# Patient Record
Sex: MALE | Race: BLACK OR AFRICAN AMERICAN | NOT HISPANIC OR LATINO | Employment: OTHER | URBAN - METROPOLITAN AREA
[De-identification: names, ages, dates, MRNs, and addresses within clinical notes are randomized per-mention and may not be internally consistent; named-entity substitution may affect disease eponyms.]

---

## 2017-02-01 DIAGNOSIS — Z79.4: Primary | ICD-10-CM

## 2017-02-01 DIAGNOSIS — E11.3553: Primary | ICD-10-CM

## 2017-07-27 ENCOUNTER — OFFICE VISIT (OUTPATIENT)
Dept: OPHTHALMOLOGY | Facility: CLINIC | Age: 67
End: 2017-07-27
Attending: OPHTHALMOLOGY

## 2017-07-27 DIAGNOSIS — Z79.4: ICD-10-CM

## 2017-07-27 DIAGNOSIS — E11.3553: ICD-10-CM

## 2017-07-27 PROCEDURE — 92250 FUNDUS PHOTOGRAPHY W/I&R: CPT | Mod: ZF | Performed by: OPHTHALMOLOGY

## 2017-07-27 PROCEDURE — 92134 CPTRZ OPH DX IMG PST SGM RTA: CPT | Mod: ZF | Performed by: OPHTHALMOLOGY

## 2017-07-27 PROCEDURE — 99213 OFFICE O/P EST LOW 20 MIN: CPT | Mod: ZF

## 2017-07-27 PROCEDURE — 92235 FLUORESCEIN ANGRPH MLTIFRAME: CPT | Mod: ZF | Performed by: OPHTHALMOLOGY

## 2017-07-27 ASSESSMENT — CONF VISUAL FIELD
METHOD: COUNTING FINGERS
OD_INFERIOR_TEMPORAL_RESTRICTION: 3
OS_NORMAL: 1
OD_SUPERIOR_TEMPORAL_RESTRICTION: 3

## 2017-07-27 ASSESSMENT — CUP TO DISC RATIO
OS_RATIO: 0.2
OD_RATIO: 0.3

## 2017-07-27 ASSESSMENT — VISUAL ACUITY
OD_SC+: -2
OS_SC: 20/25
METHOD: SNELLEN - LINEAR
OS_SC+: -2
OD_SC: 20/50

## 2017-07-27 ASSESSMENT — TONOMETRY
OD_IOP_MMHG: 18
OS_IOP_MMHG: 19
IOP_METHOD: TONOPEN

## 2017-07-27 ASSESSMENT — SLIT LAMP EXAM - LIDS
COMMENTS: NORMAL
COMMENTS: NORMAL

## 2017-07-27 ASSESSMENT — EXTERNAL EXAM - LEFT EYE: OS_EXAM: NORMAL

## 2017-07-27 ASSESSMENT — EXTERNAL EXAM - RIGHT EYE: OD_EXAM: NORMAL

## 2017-07-27 NOTE — PROGRESS NOTES
CC: follow up    HPI: status post CE/IOL with PPV/MP/EL/FAX/20% SF6 OD (7/12/16) for cataract with ERM and PDR with focal nasal TRD. vision improving.     RETINAL IMAGING  Optical Coherence Tomography 7-27-17  Right eye: improved foveal contour, mild irregularity of the retina, mild retina thinning.  Minimal residual ERM  Left eye: mild atrophy;minimal ERM    FA 7-27-17  Right eye regressed NVE with minimal leakage inferior to disk, patchy capillary dropout inferior macula,elargement of JOMAR,  PRP  Left eye NVE superior arcade, capillary dropout inferotemporal macula, PRP    Assessment/plan:  1. status post CE/IOL with PPV/MP/EL/FAX/20% SF6 OD (7/12/16)                         - doing well, vision stable, no Retinal Detachment on exam       2. PDR OU, s/p PRP                          - regressed NVE with minimal leakage Right Eye, consider focal laser to MAs    - residual NVE Left Eye with capillary dropout temporal macula, will plan for focal laser (light laser to inferotemporal macula)     - no diabetic macular edema on OCT    Follow up focal laser both eyes next Wednesday    ~~~~~~~~~~~~~~~~~~~~~~~~~~~~~~~~~~   Complete documentation of historical and exam elements from today's encounter can be found in the full encounter summary report (not reduplicated in this progress note).  I personally obtained the chief complaint(s) and history of present illness.  I confirmed and edited as necessary the review of systems, past medical/surgical history, family history, social history, and examination findings as documented by others; and I examined the patient myself.  I personally reviewed the relevant tests, images, and reports as documented above.  I formulated and edited as necessary the assessment and plan and discussed the findings and management plan with the patient and family    Mireille Don MD  .  Retina Service   Department of Ophthalmology and Visual Neurosciences   [facility]  [place]  Phone: [phone]   Fax: [phone]       .

## 2017-07-27 NOTE — NURSING NOTE
Chief Complaints and History of Present Illnesses   Patient presents with     Follow Up For     OCT and FA     HPI    Affected eye(s):  Both   Symptoms:        Frequency:  Constant       Do you have eye pain now?:  No      Comments:  States va is the same since last visit  No F&F  A1C 8.??   this am BS low 71 high 300  Becki Melendrez COT 2:40 PM July 27, 2017

## 2017-07-27 NOTE — MR AVS SNAPSHOT
After Visit Summary   2017    Berny Estrada    MRN: [id number]           Patient Information     Date Of Birth          1950        Visit Information        Provider Department      2017 2:30 PM Mireille Don MD [facility]        Today's Diagnoses     Diabetes mellitus with stable proliferative retinopathy of both eyes, with long-term current use of insulin (H)           Follow-ups after your visit        Your next 10 appointments already scheduled     Aug 02, 2017 10:00 AM CDT   RETURN RETINA with Mireille Don MD   Eye Clinic ([facility])    [address]  [address]  [address]  [place] [address]   [phone]              Who to contact     Please call your clinic at [phone] to:    Ask questions about your health    Make or cancel appointments    Discuss your medicines    Learn about your test results    Speak to your doctor   If you have compliments or concerns about an experience at your clinic, or if you wish to file a complaint, please contact [facility] Patient Relations at [phone] or email us at [email or web address]         Additional Information About Your Visit        MyChart Information     [employer] is an electronic gateway that provides easy, online access to your medical records. With [employer], you can request a clinic appointment, read your test results, renew a prescription or communicate with your care team.     To sign up for [employer] visit the website at [email or web address]   You will be asked to enter the access code listed below, as well as some personal information. Please follow the directions to create your username and password.     Your access code is: [email or web address]  Expires: 10/18/2017  6:30 AM     Your access code will  in 90 days. If you need help or a new code, please contact your [facility] Clinic or call [phone]  for assistance.        Care EveryWhere ID     This is your Care EveryWhere ID. This could be used by other organizations to access your [place] medical records  [id number]         Blood Pressure from Last 3 Encounters:   07/12/16 139/89    Weight from Last 3 Encounters:   07/12/16 102.5 kg (226 lb)              We Performed the Following     Fluorescein Angiography OU (both eyes)     Fundus Photos OU (both eyes)     OCT Retina Spectralis OU (both eyes)        Primary Care Provider Office Phone # Fax #    Patria Goodson, AURA [phone]       [facility] [address] [facility] [address]        Equal Access to Services     ERICK [facility] : Hadii aad ku hadasho Soomaali, waaxda luqadaha, qaybta kaalmada adeegyada, meghana argueta haybrien dupree . So [facility] [phone].    ATENCIÓN: Si habla español, tiene a dodd disposición servicios gratuitos de asistencia lingüística. [facility] [phone].    We comply with applicable federal civil rights laws and [place] laws. We do not discriminate on the basis of race, color, national origin, age, disability sex, sexual orientation or gender identity.            Thank you!     Thank you for choosing EYE CLINIC  for your care. Our goal is always to provide you with excellent care. Hearing back from our patients is one way we can continue to improve our services. Please take a few minutes to complete the written survey that you may receive in the mail after your visit with us. Thank you!             Your Updated Medication List - Protect others around you: Learn how to safely use, store and throw away your medicines at www.disposemymeds.org.          This list is accurate as of: 7/27/17  4:25 PM.  Always use your most recent med list.                   Brand Name Dispense Instructions for use Diagnosis    amLODIPine 10 MG tablet    NORVASC     Take 10 mg by mouth daily        ASPIRIN PO      Take 81 mg by mouth        carvedilol 12.5 MG  tablet    COREG     Take 12.5 mg by mouth 2 times daily        insulin aspart U-10 (diluted conc 10 units/mL) 10 unit/ml injection    NovoLOG     Inject Subcutaneous 3 times daily (before meals) 10-16 units        INSULIN GLARGINE SC      Inject 460 Units Subcutaneous At Bedtime 7/11 am        ketorolac 0.5 % ophthalmic solution    ACULAR    1 Bottle    Place 1 drop into the right eye 3 times daily    Aftercare following surgery of a sense organ       mycophenolate (IDS 3865) 250 MG capsule    CELLCEPT     Take 2 tablets by mouth 2 times daily        OMEPRAZOLE PO      Take 1 tablet by mouth daily        prednisoLONE 5 MG tablet      Take 1 tablet by mouth daily        prednisoLONE acetate 1 % ophthalmic susp    PRED FORTE    1 Bottle    Place 1 drop into the right eye 3 times daily    Aftercare following surgery of a sense organ       ranitidine 150 MG capsule    ZANTAC     Take 1 tablet by mouth daily        tacrolimus 5 MG capsule    PROGRAF - GENERIC EQUIVALENT     Take 5 mg by mouth daily        VITAMIN D (CHOLECALCIFEROL) PO      Take 1 tablet by mouth once a week

## 2017-07-27 NOTE — LETTER
7/27/2017      RE: eBrny Estrada  [address] [address]       CC: follow up    HPI: status post CE/IOL with PPV/MP/EL/FAX/20% SF6 OD (7/12/16) for cataract with ERM and PDR with focal nasal TRD. vision improving.     RETINAL IMAGING  Optical Coherence Tomography 7-27-17  Right eye: improved foveal contour, mild irregularity of the retina, mild retina thinning.  Minimal residual ERM  Left eye: mild atrophy;minimal ERM    FA 7-27-17  Right eye regressed NVE with minimal leakage inferior to disk, patchy capillary dropout inferior macula,elargement of JOMAR,  PRP  Left eye NVE superior arcade, capillary dropout inferotemporal macula, PRP    Assessment/plan:  1. status post CE/IOL with PPV/MP/EL/FAX/20% SF6 OD (7/12/16)                         -  doing well, vision stable, no Retinal Detachment on exam       2. PDR OU, s/p PRP                          - regressed NVE with minimal leakage Right Eye, consider focal laser to MAs    - residual NVE Left Eye with capillary dropout temporal macula, will plan for focal laser (light laser to inferotemporal macula)     - no diabetic macular edema on OCT    Follow up focal laser both eyes next Wednesday        ~~~~~~~~~~~~~~~~~~~~~~~~~~~~~~~~~~   Complete documentation of historical and exam elements from today's encounter can be found in the full encounter summary report (not reduplicated in this progress note).  I personally obtained the chief complaint(s) and history of present illness.  I confirmed and edited as necessary the review of systems, past medical/surgical history, family history, social history, and examination findings as documented by others; and I examined the patient myself.  I personally reviewed the relevant tests, images, and reports as documented above.  I formulated and edited as necessary the assessment and plan and discussed the findings and management plan with the patient and family    Mireille Don MD  .   Retina Service   Department of Ophthalmology and Visual Neurosciences   [facility]  Phone: [phone]   Fax: [phone]         Mireille Don MD     Retina Service   Department of Ophthalmology and Visual Neurosciences   [facility]  Phone:  [phone]   Fax:  [phone]

## 2017-07-27 NOTE — LETTER
7/27/2017       RE: Berny Estrada  [address] [address]     Dear Colleague,    Thank you for referring your patient, Berny Estrada, to the EYE CLINIC at [facility]. Please see a copy of my visit note below.    CC: follow up    HPI: status post CE/IOL with PPV/MP/EL/FAX/20% SF6 OD (7/12/16) for cataract with ERM and PDR with focal nasal TRD. vision improving.     RETINAL IMAGING  Optical Coherence Tomography 7-27-17  Right eye: improved foveal contour, mild irregularity of the retina, mild retina thinning.  Minimal residual ERM  Left eye: mild atrophy;minimal ERM    FA 7-27-17  Right eye regressed NVE with minimal leakage inferior to disk, patchy capillary dropout inferior macula,elargement of JOMAR,  PRP  Left eye NVE superior arcade, capillary dropout inferotemporal macula, PRP    Assessment/plan:  1. status post CE/IOL with PPV/MP/EL/FAX/20% SF6 OD (7/12/16)                         -  doing well, vision stable, no Retinal Detachment on exam       2. PDR OU, s/p PRP                          - regressed NVE with minimal leakage Right Eye, consider focal laser to MAs    - residual NVE Left Eye with capillary dropout temporal macula, will plan for focal laser (light laser to inferotemporal macula)     - no diabetic macular edema on OCT    Follow up focal laser both eyes next Wednesday    ~~~~~~~~~~~~~~~~~~~~~~~~~~~~~~~~~~   Complete documentation of historical and exam elements from today's encounter can be found in the full encounter summary report (not reduplicated in this progress note).  I personally obtained the chief complaint(s) and history of present illness.  I confirmed and edited as necessary the review of systems, past medical/surgical history, family history, social history, and examination findings as documented by others; and I examined the patient myself.  I personally reviewed the relevant tests, images, and reports as documented above.  I  formulated and edited as necessary the assessment and plan and discussed the findings and management plan with the patient and family    Mireille Don MD  .  Retina Service   Department of Ophthalmology and Visual Neurosciences   [facility]  Phone: [phone]   Fax: [phone]       .

## 2021-05-27 ENCOUNTER — TRANSFERRED RECORDS (OUTPATIENT)
Dept: HEALTH INFORMATION MANAGEMENT | Facility: CLINIC | Age: 71
End: 2021-05-27

## 2021-10-12 PROCEDURE — 80197 ASSAY OF TACROLIMUS: CPT

## 2021-10-13 ENCOUNTER — LAB REQUISITION (OUTPATIENT)
Dept: LAB | Facility: CLINIC | Age: 71
End: 2021-10-13

## 2021-10-14 LAB
TACROLIMUS BLD-MCNC: 4.6 UG/L (ref 5–15)
TME LAST DOSE: ABNORMAL H
TME LAST DOSE: ABNORMAL H

## 2022-02-04 ENCOUNTER — LAB REQUISITION (OUTPATIENT)
Dept: LAB | Facility: CLINIC | Age: 72
End: 2022-02-04

## 2022-02-04 ENCOUNTER — TRANSFERRED RECORDS (OUTPATIENT)
Dept: HEALTH INFORMATION MANAGEMENT | Facility: CLINIC | Age: 72
End: 2022-02-04

## 2022-02-04 PROCEDURE — 87252 VIRUS INOCULATION TISSUE: CPT

## 2022-02-04 PROCEDURE — 80197 ASSAY OF TACROLIMUS: CPT

## 2022-02-05 LAB
TACROLIMUS BLD-MCNC: 8.7 UG/L (ref 5–15)
TME LAST DOSE: NORMAL H
TME LAST DOSE: NORMAL H

## 2022-10-12 PROCEDURE — 80197 ASSAY OF TACROLIMUS: CPT

## 2022-10-13 ENCOUNTER — LAB REQUISITION (OUTPATIENT)
Dept: LAB | Facility: CLINIC | Age: 72
End: 2022-10-13

## 2022-10-14 LAB
TACROLIMUS BLD-MCNC: 12.8 UG/L (ref 5–15)
TME LAST DOSE: NORMAL H
TME LAST DOSE: NORMAL H

## 2023-01-20 ENCOUNTER — LAB REQUISITION (OUTPATIENT)
Dept: LAB | Facility: CLINIC | Age: 73
End: 2023-01-20

## 2023-01-20 LAB
TACROLIMUS BLD-MCNC: 3.9 UG/L (ref 5–15)
TME LAST DOSE: ABNORMAL H
TME LAST DOSE: ABNORMAL H

## 2023-01-20 PROCEDURE — 80197 ASSAY OF TACROLIMUS: CPT

## 2023-02-09 PROCEDURE — 80197 ASSAY OF TACROLIMUS: CPT

## 2023-02-10 ENCOUNTER — LAB REQUISITION (OUTPATIENT)
Dept: LAB | Facility: CLINIC | Age: 73
End: 2023-02-10

## 2023-02-10 LAB
TACROLIMUS BLD-MCNC: 5 UG/L (ref 5–15)
TME LAST DOSE: NORMAL H
TME LAST DOSE: NORMAL H

## 2023-02-27 ENCOUNTER — LAB REQUISITION (OUTPATIENT)
Dept: LAB | Facility: CLINIC | Age: 73
End: 2023-02-27

## 2023-02-27 LAB
TACROLIMUS BLD-MCNC: 5.6 UG/L (ref 5–15)
TME LAST DOSE: NORMAL H
TME LAST DOSE: NORMAL H

## 2023-02-27 PROCEDURE — 80197 ASSAY OF TACROLIMUS: CPT

## 2023-06-08 PROCEDURE — 80197 ASSAY OF TACROLIMUS: CPT

## 2023-06-09 ENCOUNTER — LAB REQUISITION (OUTPATIENT)
Dept: LAB | Facility: CLINIC | Age: 73
End: 2023-06-09

## 2023-06-10 LAB
TACROLIMUS BLD-MCNC: 15.5 UG/L (ref 5–15)
TME LAST DOSE: ABNORMAL H
TME LAST DOSE: ABNORMAL H

## 2023-06-27 ENCOUNTER — TRANSFERRED RECORDS (OUTPATIENT)
Dept: HEALTH INFORMATION MANAGEMENT | Facility: CLINIC | Age: 73
End: 2023-06-27

## 2023-06-27 PROCEDURE — 80197 ASSAY OF TACROLIMUS: CPT

## 2023-06-28 ENCOUNTER — LAB REQUISITION (OUTPATIENT)
Dept: LAB | Facility: CLINIC | Age: 73
End: 2023-06-28

## 2023-06-28 LAB
TACROLIMUS BLD-MCNC: 3.7 UG/L (ref 5–15)
TME LAST DOSE: ABNORMAL H
TME LAST DOSE: ABNORMAL H

## 2023-09-01 PROCEDURE — 80197 ASSAY OF TACROLIMUS: CPT

## 2023-09-05 ENCOUNTER — LAB REQUISITION (OUTPATIENT)
Dept: LAB | Facility: CLINIC | Age: 73
End: 2023-09-05

## 2023-09-05 LAB
TACROLIMUS BLD-MCNC: 3.9 UG/L (ref 5–15)
TME LAST DOSE: ABNORMAL H
TME LAST DOSE: ABNORMAL H

## 2023-12-01 ENCOUNTER — TRANSFERRED RECORDS (OUTPATIENT)
Dept: HEALTH INFORMATION MANAGEMENT | Facility: CLINIC | Age: 73
End: 2023-12-01
Payer: MEDICARE

## 2023-12-01 PROCEDURE — 80197 ASSAY OF TACROLIMUS: CPT

## 2023-12-02 ENCOUNTER — LAB REQUISITION (OUTPATIENT)
Dept: LAB | Facility: CLINIC | Age: 73
End: 2023-12-02

## 2023-12-02 ENCOUNTER — TRANSFERRED RECORDS (OUTPATIENT)
Dept: HEALTH INFORMATION MANAGEMENT | Facility: CLINIC | Age: 73
End: 2023-12-02
Payer: MEDICARE

## 2023-12-02 LAB
TACROLIMUS BLD-MCNC: 2.2 UG/L (ref 5–15)
TME LAST DOSE: ABNORMAL H
TME LAST DOSE: ABNORMAL H

## 2023-12-03 LAB — ALBUMIN/CREATININE RATIO: 54.3 MG/G CREAT

## 2023-12-03 PROCEDURE — 80197 ASSAY OF TACROLIMUS: CPT

## 2023-12-04 ENCOUNTER — TRANSFERRED RECORDS (OUTPATIENT)
Dept: HEALTH INFORMATION MANAGEMENT | Facility: CLINIC | Age: 73
End: 2023-12-04
Payer: MEDICARE

## 2023-12-04 ENCOUNTER — LAB REQUISITION (OUTPATIENT)
Dept: LAB | Facility: CLINIC | Age: 73
End: 2023-12-04

## 2023-12-04 LAB
TACROLIMUS BLD-MCNC: 2.2 UG/L (ref 5–15)
TME LAST DOSE: ABNORMAL H
TME LAST DOSE: ABNORMAL H

## 2023-12-05 ENCOUNTER — LAB REQUISITION (OUTPATIENT)
Dept: LAB | Facility: CLINIC | Age: 73
End: 2023-12-05

## 2023-12-05 LAB
TACROLIMUS BLD-MCNC: 5.4 UG/L (ref 5–15)
TME LAST DOSE: NORMAL H
TME LAST DOSE: NORMAL H

## 2023-12-05 PROCEDURE — 80197 ASSAY OF TACROLIMUS: CPT

## 2023-12-06 ENCOUNTER — LAB REQUISITION (OUTPATIENT)
Dept: LAB | Facility: CLINIC | Age: 73
End: 2023-12-06

## 2023-12-06 LAB
CMV DNA SPEC NAA+PROBE-ACNC: 338 IU/ML
CMV DNA SPEC NAA+PROBE-LOG#: 2.5 {LOG_COPIES}/ML

## 2023-12-07 ENCOUNTER — LAB REQUISITION (OUTPATIENT)
Dept: LAB | Facility: CLINIC | Age: 73
End: 2023-12-07

## 2023-12-07 LAB
TACROLIMUS BLD-MCNC: 5.1 UG/L (ref 5–15)
TME LAST DOSE: NORMAL H
TME LAST DOSE: NORMAL H

## 2023-12-07 PROCEDURE — 80197 ASSAY OF TACROLIMUS: CPT

## 2023-12-08 ENCOUNTER — TRANSFERRED RECORDS (OUTPATIENT)
Dept: HEALTH INFORMATION MANAGEMENT | Facility: CLINIC | Age: 73
End: 2023-12-08
Payer: MEDICARE

## 2023-12-09 ENCOUNTER — LAB REQUISITION (OUTPATIENT)
Dept: LAB | Facility: CLINIC | Age: 73
End: 2023-12-09

## 2023-12-09 ENCOUNTER — TRANSFERRED RECORDS (OUTPATIENT)
Dept: HEALTH INFORMATION MANAGEMENT | Facility: CLINIC | Age: 73
End: 2023-12-09
Payer: MEDICARE

## 2023-12-09 LAB
TACROLIMUS BLD-MCNC: 5.7 UG/L (ref 5–15)
TME LAST DOSE: NORMAL H
TME LAST DOSE: NORMAL H

## 2023-12-09 PROCEDURE — 80197 ASSAY OF TACROLIMUS: CPT

## 2023-12-13 ENCOUNTER — TELEPHONE (OUTPATIENT)
Dept: SURGERY | Facility: CLINIC | Age: 73
End: 2023-12-13
Payer: MEDICARE

## 2023-12-13 ENCOUNTER — LAB REQUISITION (OUTPATIENT)
Dept: LAB | Facility: CLINIC | Age: 73
End: 2023-12-13

## 2023-12-13 ENCOUNTER — TRANSFERRED RECORDS (OUTPATIENT)
Dept: HEALTH INFORMATION MANAGEMENT | Facility: CLINIC | Age: 73
End: 2023-12-13
Payer: MEDICARE

## 2023-12-13 LAB
TACROLIMUS BLD-MCNC: 8 UG/L (ref 5–15)
TME LAST DOSE: NORMAL H
TME LAST DOSE: NORMAL H

## 2023-12-13 PROCEDURE — 80197 ASSAY OF TACROLIMUS: CPT

## 2023-12-13 NOTE — TELEPHONE ENCOUNTER
Records Requested    Facility  [facility] - Referred by Dr. Kelley  Fax: [phone]    Outcome * 12/13/23 2:40 PM Faxed urgent request to [facility] for records to be faxed to the clinic. - Tiffanie    * 12/14/23 2:09 PM Over 600 pages received from [facility] and sent to HIM to be scanned into to chart. - Tiffanie    [facility]:  12/1/23 - Admission with Dr. Spaulding (12/7/23 CR Consult with Dr. Kelley)  11/23/23 - NEPH OV with Dr. Robledo    12/4/23 - Colonoscopy (Case: [id number])

## 2023-12-15 ENCOUNTER — PATIENT OUTREACH (OUTPATIENT)
Dept: SURGERY | Facility: CLINIC | Age: 73
End: 2023-12-15
Payer: MEDICARE

## 2023-12-15 ENCOUNTER — TRANSCRIBE ORDERS (OUTPATIENT)
Dept: OTHER | Age: 73
End: 2023-12-15

## 2023-12-15 DIAGNOSIS — C18.9 MALIGNANT NEOPLASM OF COLON (H): Primary | ICD-10-CM

## 2023-12-15 DIAGNOSIS — C18.0 MALIGNANT NEOPLASM OF CECUM (H): Primary | ICD-10-CM

## 2023-12-15 NOTE — TELEPHONE ENCOUNTER
Diagnosis, Referred by & from: Cecal Cancer   Appt date: 2024   NOTES STATUS DETAILS   OFFICE NOTE from referring provider N/A    OFFICE NOTE from other specialist Received [facility]:  23 - NEPH OV with Dr. Robledo   DISCHARGE SUMMARY from hospital Care Everywhere / Received [facility]:  23 - Admission with Dr. Spaulding (23 CR Consult with Dr. Kelley)  22 - Admission with Dr. Field  22 - Admission with Dr. Carranza      [facility]:  7/15/19 - Admission with Dr. Good   DISCHARGE REPORT from the ER N/A    OPERATIVE REPORT N/A    MEDICATION LIST Received    LABS     ANAL PAP/CEA [facility]:  ([facility]) 23 - CEA   BIOPSIES/PATHOLOGY RELATED TO DIAGNOSIS Received [facility]:  23 - Colon Biopsy (Case: [id number])   DIAGNOSTIC PROCEDURES     COLONOSCOPY Received [facility]:  23 - Colonoscopy   IMAGING (DISC & REPORT)      CT [facility]:  ([facility]) 23 - CT Chest/Abd/Pelvis     Records Requested  12/15/23    Facility  [facility]  IMG Fax: [phone]  Path Fax: [phone]    Outcome * 12/15/23 1:40 PM Faxed urg req to [facility] for images to be sent to the clinic. - Tiffanie    * 12/15/23 2:23 PM Faxed urg req to [facility] for pathology slides to be Fed'Exd to the clinic. - Tiffanie    * 23 2:37 PM Pathology slides received from [facility] and sent to be reviewed with filled out pathology form. - Tiffanie    Trackin

## 2023-12-15 NOTE — PROGRESS NOTES
Patient was called after receiving a referral from Dr. Kelley for cecal cancer. Patient has not completed staging.    CT scan: Yes 12/21                   Blood work:Yes 12/21    [facility]    Are images able/being pushed to our system? N/a    Colonoscopy Report: Yes         Pathology Report: Yes     Moderately differentiated adenocarcinoma of the cecum     Patient was offered a clinic appointment with  on 1/8. Patient is in agreement with this appointment date, time, and location. Patient's questions and concerns were addressed to his stated satisfaction. Patient is in agreement with this plan of care.

## 2023-12-21 ENCOUNTER — LAB (OUTPATIENT)
Dept: LAB | Facility: CLINIC | Age: 73
End: 2023-12-21
Payer: COMMERCIAL

## 2023-12-21 ENCOUNTER — ANCILLARY PROCEDURE (OUTPATIENT)
Dept: CT IMAGING | Facility: CLINIC | Age: 73
End: 2023-12-21
Attending: COLON & RECTAL SURGERY
Payer: COMMERCIAL

## 2023-12-21 DIAGNOSIS — C18.9 MALIGNANT NEOPLASM OF COLON (H): ICD-10-CM

## 2023-12-21 PROCEDURE — 74176 CT ABD & PELVIS W/O CONTRAST: CPT | Mod: GC | Performed by: RADIOLOGY

## 2023-12-21 PROCEDURE — 36415 COLL VENOUS BLD VENIPUNCTURE: CPT | Performed by: PATHOLOGY

## 2023-12-21 PROCEDURE — 71250 CT THORAX DX C-: CPT | Mod: GC | Performed by: RADIOLOGY

## 2023-12-21 PROCEDURE — 99000 SPECIMEN HANDLING OFFICE-LAB: CPT | Performed by: PATHOLOGY

## 2023-12-21 PROCEDURE — 82378 CARCINOEMBRYONIC ANTIGEN: CPT | Performed by: COLON & RECTAL SURGERY

## 2023-12-22 LAB — CEA SERPL-MCNC: 31.3 NG/ML

## 2023-12-26 LAB
PATH REPORT.COMMENTS IMP SPEC: ABNORMAL
PATH REPORT.COMMENTS IMP SPEC: YES
PATH REPORT.FINAL DX SPEC: ABNORMAL
PATH REPORT.GROSS SPEC: ABNORMAL
PATH REPORT.MICROSCOPIC SPEC OTHER STN: ABNORMAL
PATH REPORT.RELEVANT HX SPEC: ABNORMAL
PATH REPORT.RELEVANT HX SPEC: ABNORMAL
PATH REPORT.SITE OF ORIGIN SPEC: ABNORMAL

## 2023-12-26 PROCEDURE — 88321 CONSLTJ&REPRT SLD PREP ELSWR: CPT | Performed by: PATHOLOGY

## 2023-12-27 DIAGNOSIS — Z15.89 MLH1 GENE MUTATION: Primary | ICD-10-CM

## 2024-01-05 NOTE — PROGRESS NOTES
Colon and Rectal Surgery Clinic Note    RE: Berny Esrtada.  : 1950.  MAEVE: 2024.    Reason for visit: New cecal adenocarcinoma.    HPI:   73-year-old male who [facility] Hurtado for [facility].      Pathology consult here at [facility]:  1.  MID ESOPHAGUS, BIOPSY:  - Squamous mucosa with superficial colonization by fungal organisms positive for PAS stain, morphologically compatible with Candida species     2.  COLON, CECUM MASS, BIOPSY:  - Adenocarcinoma, moderately differentiated  - Loss of expression of MLH1 and PMS2; intact expression of MSH2 and MSH6 (see comment)     3.  ASCENDING COLON, POLYPECTOMY:  - Sessile serrated adenoma with focal high-grade dysplasia  - Separate minute fragment of adenocarcinoma     4.  TRANSVERSE COLON, POLYPECTOMY:  - Tubular adenoma; negative for high-grade dysplasia     5.  DESCENDING COLON, POLYPECTOMY:  - Tubular adenoma; negative for high-grade dysplasia     6.  RECTOSIGMOID COLON, POLYPECTOMY:  - Sessile serrated adenoma with focal cytologic dysplasia  - No evidence of high-grade dysplasia or invasive carcinoma  CT CAP 23:  IMPRESSION:   1. Cecal mass with enlarged nodes of the right lower quadrant  including a 2.1 x 1.7 cm lymph node, which are suspicious for  metastatic disease. Indeterminate thickening and nodularity of the  peritoneal surfaces near the cecal mass could be due to fluid or  mesenteric spread of disease.  3. Indeterminate 1.6 cm lesion of the left native kidney. Consider  dedicated renal MRI with contrast for further characterization.  4. Right lower quadrant renal transplant with mild hydronephrosis.  5. Ill-defined groundglass opacities of the left upper lobe and  lingula suspicious for an infectious/inflammatory etiology. Additional  bronchiectatic and fibroatelectatic changes of the right middle lobe  and lingula suggesting a chronic inflammatory process.  6. Small left-sided pleural  effusion.  7. Dilated main pulmonary artery as can be seen with pulmonary  arterial hypertension.  8. Cholelithiasis without findings to suggest acute cholecystitis.  CEA 31.3.  PMH: kidney transplant (2014), thrombocytopenia, DM II, HTN, COPD, CKD 3.  Apart from his kidney transplant, denies any previous abdominal or anorectal operations.  Apart from his iron deficiency anemia, he was not having any abdominal or GI symptoms.  Denies blood per rectum.  Family history significant for colon and prostate cancer in his brother.  The rest of his family history history is largely unknown.  He did have 1 previous colonoscopy right before his kidney transplant at [facility] and this was normal per the patient.  His overall health has declined over the last 6 months where he is currently in a wheelchair but does not use this all the time but is only able to ambulate 20-30 steps at most without becoming short of breath.  He is also on home oxygen.          Medical history:  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    Sleep apnea     has equipment  not always uses    Type 2 diabetes mellitus without complications (H)        Surgical history:  Past Surgical History:   Procedure Laterality Date    EYE SURGERY  7/12/2016    CE IOL PPV RE    PANRETINAL PHOTOCOAGULATION (PRP) OD (RIGHT EYE)      last 9/18/13    PANRETINAL PHOTOCOAGULATION (PRP) OS (LEFT EYE)  4/15/14    [facility]    PHACOEMULSIFICATION CLEAR CORNEA WITH STANDARD IOL, VITRECTOMY PARSPLANA 25 GAGUE, COMBINED Right 7/12/2016    Procedure: COMBINED PHACOEMULSIFICATION CLEAR CORNEA WITH STANDARD INTRAOCULAR LENS IMPLANT, VITRECTOMY PARSPLANA 25 GAUGE;  Surgeon: Mireille Don MD;  Location: [facility]       Family history:  Family History   Problem Relation Age of Onset    Glaucoma No family hx of      Macular Degeneration No family hx of        Medications:  Current Outpatient Medications   Medication Sig Dispense Refill    albuterol (PROAIR HFA/PROVENTIL HFA/VENTOLIN HFA) 108 (90 Base) MCG/ACT inhaler INHALE 2 PUFFS BY INHALATION EVERY 6 HOURS AS NEEDED FOR SHORTNESS OF BREATH, FOR SECRETIONS      amLODIPine (NORVASC) 10 MG tablet Take 10 mg by mouth daily      ASPIRIN PO Take 81 mg by mouth      carvedilol (COREG) 12.5 MG tablet Take 12.5 mg by mouth 2 times daily      fluconazole (DIFLUCAN) 200 MG tablet 200 mg      insulin aspart U-10, diluted conc 10 units/mL, (NOVOLOG) 10 unit/ml injection Inject Subcutaneous 3 times daily (before meals) 10-16 units      INSULIN GLARGINE SC Inject 460 Units Subcutaneous At Bedtime 7/11 am      ipratropium - albuterol 0.5 mg/2.5 mg/3 mL (DUONEB) 0.5-2.5 (3) MG/3ML neb solution INHALE 3ML IN NEBULIZER BY INHALATION FOUR TIMES A DAY AS NEEDED FOR SHORTNESS OF BREATH      ketorolac (ACULAR) 0.5 % ophthalmic solution Place 1 drop into the right eye 3 times daily 1 Bottle 1    loratadine (CLARITIN) 10 MG tablet 10 mg      mycophenolate, IDS 3865, (CELLCEPT) 250 MG capsule Take 2 tablets by mouth 2 times daily      OMEPRAZOLE PO Take 1 tablet by mouth daily      prednisoLONE 5 MG TABS Take 1 tablet by mouth daily      prednisoLONE acetate (PRED FORTE) 1 % ophthalmic suspension Place 1 drop into the right eye 3 times daily 1 Bottle 1    predniSONE (DELTASONE) 10 MG tablet Take 1 tablet by mouth daily      ranitidine (ZANTAC) 150 MG capsule Take 1 tablet by mouth daily      tacrolimus (PROGRAF-GENERIC EQUIVALENT) 5 MG capsule Take 5 mg by mouth daily      tiotropium-olodaterol 2.5-2.5 MCG/ACT AERS INHALE 2 PUFFS BY INHALATION EVERY DAY FOR COPD      VITAMIN D, CHOLECALCIFEROL, PO Take 1 tablet by mouth once a week         Allergies:  No Known Allergies    Social history:   Social History     Tobacco Use    Smoking status: Former    Smokeless tobacco: Never   Substance Use Topics     Alcohol use: Not on file     Marital status: .    Physical Examination:  BP (!) 142/78 (BP Location: Right arm, Patient Position: Sitting, Cuff Size: Adult Regular)   Pulse 70   SpO2 100%   General: Well hydrated. No acute distress.  Abdomen: Soft, NT. No inguinal adenopathy palpated.    Investigations:  None.    Procedures:  None.    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  No clear evidence of extra-abdominal metastatic disease. We discussed the role and impact of cytoreductive surgery with hyperthermic intraperitoneal chemotherapy for metastatic colorectal cancer, including the potential need for several cycles of preoperative chemotherapy, intraoperative findings that may limit or preclude the extent of resection and delivery of intraperitoneal chemotherapy, postoperative complications and sequelae, likelihood of recurrent cancer, and quality of life. We also discussed recent grade IA data (Prodige 7 trial) showing no significant differences in overall survival with cytoreductive surgery with hyperthermic intraperitoneal chemotherapy versus cytoreductive surgery alone. The patient understands these well and agrees to proceed. All questions were answered to his satisfaction. Risks, benefits, and alternatives of operative treatment were thoroughly discussed with the patient, he/she understands these well and agrees to proceed.    PLAN  1.  Schedule full set of labs with tumor markers including CEA, , and CA 19-9.  Also send prealbumin and transferrin.  2.  Please order MLH1 promoter gene testing.  3.  Schedule MR abdomen and pelvis (appendix protocol).  4.  Refer to medical oncology for likely systemic chemotherapy if presence of peritoneal carcinomatosis.  5.  Will discuss at  multidisciplinary colorectal cancer conference.  6.  Berny is a an extremely complicated patient because of his comorbidities and poor functional status.  I am not sure he would tolerate extensive abdominal surgery, much less cytoreduction or HIPEC.  Very high risk for postoperative complications and permanent stoma which may affect his renal function as well.    60 minutes spent on the date of encounter performing chart review, history and exam, documentation.     Mitchell Reid MD, MSc, FACS, FASCRS.    Chief, Division of Colon & Rectal Surgery  Stanley M. Goldberg, MD Endowed Chair, Colon & Rectal Surgery  Department of Surgery  [facility]      Referring Provider:  Lluvia Kelley MD  [facility] [address]  [place] [address]     Primary Care Provider:  Cat Goodson

## 2024-01-08 ENCOUNTER — OFFICE VISIT (OUTPATIENT)
Dept: SURGERY | Facility: CLINIC | Age: 74
End: 2024-01-08
Payer: COMMERCIAL

## 2024-01-08 ENCOUNTER — PRE VISIT (OUTPATIENT)
Dept: SURGERY | Facility: CLINIC | Age: 74
End: 2024-01-08

## 2024-01-08 VITALS — SYSTOLIC BLOOD PRESSURE: 142 MMHG | OXYGEN SATURATION: 100 % | HEART RATE: 70 BPM | DIASTOLIC BLOOD PRESSURE: 78 MMHG

## 2024-01-08 DIAGNOSIS — C18.0 MALIGNANT NEOPLASM OF CECUM (H): ICD-10-CM

## 2024-01-08 PROCEDURE — 99205 OFFICE O/P NEW HI 60 MIN: CPT | Performed by: COLON & RECTAL SURGERY

## 2024-01-08 RX ORDER — ALBUTEROL SULFATE 90 UG/1
AEROSOL, METERED RESPIRATORY (INHALATION)
COMMUNITY
Start: 2023-11-22

## 2024-01-08 RX ORDER — IPRATROPIUM BROMIDE AND ALBUTEROL SULFATE 2.5; .5 MG/3ML; MG/3ML
SOLUTION RESPIRATORY (INHALATION)
COMMUNITY
Start: 2023-11-30

## 2024-01-08 RX ORDER — FLUCONAZOLE 200 MG/1
200 TABLET ORAL
COMMUNITY
Start: 2023-12-08 | End: 2024-03-04

## 2024-01-08 RX ORDER — LORATADINE 10 MG/1
10 TABLET ORAL
COMMUNITY
Start: 2023-12-08

## 2024-01-08 RX ORDER — PREDNISONE 10 MG/1
40 TABLET ORAL DAILY
COMMUNITY
Start: 2023-12-08

## 2024-01-08 ASSESSMENT — PAIN SCALES - GENERAL: PAINLEVEL: NO PAIN (0)

## 2024-01-08 NOTE — LETTER
2024       RE: Berny Estrada  [address] [facility] [address]       Dear Colleague,    Thank you for referring your patient, Berny Estrada, to the [facility] [place] at [facility]. Please see a copy of my visit note below.    Colon and Rectal Surgery Clinic Note    RE: Berny Estrada.  : 1950.  MAEVE: 2024.    Reason for visit: New cecal adenocarcinoma.    HPI:   73-year-old male who [facility] Hurtado for anemia.      Pathology consult here at [facility]:  1.  MID ESOPHAGUS, BIOPSY:  - Squamous mucosa with superficial colonization by fungal organisms positive for PAS stain, morphologically compatible with Candida species     2.  COLON, CECUM MASS, BIOPSY:  - Adenocarcinoma, moderately differentiated  - Loss of expression of MLH1 and PMS2; intact expression of MSH2 and MSH6 (see comment)     3.  ASCENDING COLON, POLYPECTOMY:  - Sessile serrated adenoma with focal high-grade dysplasia  - Separate minute fragment of adenocarcinoma     4.  TRANSVERSE COLON, POLYPECTOMY:  - Tubular adenoma; negative for high-grade dysplasia     5.  DESCENDING COLON, POLYPECTOMY:  - Tubular adenoma; negative for high-grade dysplasia     6.  RECTOSIGMOID COLON, POLYPECTOMY:  - Sessile serrated adenoma with focal cytologic dysplasia  - No evidence of high-grade dysplasia or invasive carcinoma  CT CAP 23:  IMPRESSION:   1. Cecal mass with enlarged nodes of the right lower quadrant  including a 2.1 x 1.7 cm lymph node, which are suspicious for  metastatic disease. Indeterminate thickening and nodularity of the  peritoneal surfaces near the cecal mass could be due to fluid or  mesenteric spread of disease.  3. Indeterminate 1.6 cm lesion of the left native kidney. Consider  dedicated renal MRI with contrast for further characterization.  4. Right lower quadrant  renal transplant with mild hydronephrosis.  5. Ill-defined groundglass opacities of the left upper lobe and  lingula suspicious for an infectious/inflammatory etiology. Additional  bronchiectatic and fibroatelectatic changes of the right middle lobe  and lingula suggesting a chronic inflammatory process.  6. Small left-sided pleural effusion.  7. Dilated main pulmonary artery as can be seen with pulmonary  arterial hypertension.  8. Cholelithiasis without findings to suggest acute cholecystitis.  CEA 31.3.  PMH: kidney transplant (2014), thrombocytopenia, DM II, HTN, COPD, CKD 3.  Apart from his kidney transplant, denies any previous abdominal or anorectal operations.  Apart from his iron deficiency anemia, he was not having any abdominal or GI symptoms.  Denies blood per rectum.  Family history significant for colon and prostate cancer in his brother.  The rest of his family history history is largely unknown.  He did have 1 previous colonoscopy right before his kidney transplant at [facility] and this was normal per the patient.  His overall health has declined over the last 6 months where he is currently in a wheelchair but does not use this all the time but is only able to ambulate 20-30 steps at most without becoming short of breath.  He is also on home oxygen.          Medical history:  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    Sleep apnea     has equipment  not always uses    Type 2 diabetes mellitus without complications (H)        Surgical history:  Past Surgical History:   Procedure Laterality Date    EYE SURGERY  7/12/2016    CE IOL PPV RE    PANRETINAL PHOTOCOAGULATION (PRP) OD (RIGHT EYE)      last 9/18/13    PANRETINAL PHOTOCOAGULATION (PRP) OS (LEFT EYE)  4/15/14    [facility]    PHACOEMULSIFICATION CLEAR CORNEA WITH STANDARD IOL,  VITRECTOMY PARSPLANA 25 GAGUE, COMBINED Right 7/12/2016    Procedure: COMBINED PHACOEMULSIFICATION CLEAR CORNEA WITH STANDARD INTRAOCULAR LENS IMPLANT, VITRECTOMY PARSPLANA 25 GAUGE;  Surgeon: Mireille Don MD;  Location: [facility]       Family history:  Family History   Problem Relation Age of Onset    Glaucoma No family hx of     Macular Degeneration No family hx of        Medications:  Current Outpatient Medications   Medication Sig Dispense Refill    albuterol (PROAIR HFA/PROVENTIL HFA/VENTOLIN HFA) 108 (90 Base) MCG/ACT inhaler INHALE 2 PUFFS BY INHALATION EVERY 6 HOURS AS NEEDED FOR SHORTNESS OF BREATH, FOR SECRETIONS      amLODIPine (NORVASC) 10 MG tablet Take 10 mg by mouth daily      ASPIRIN PO Take 81 mg by mouth      carvedilol (COREG) 12.5 MG tablet Take 12.5 mg by mouth 2 times daily      fluconazole (DIFLUCAN) 200 MG tablet 200 mg      insulin aspart U-10, diluted conc 10 units/mL, (NOVOLOG) 10 unit/ml injection Inject Subcutaneous 3 times daily (before meals) 10-16 units      INSULIN GLARGINE SC Inject 460 Units Subcutaneous At Bedtime 7/11 am      ipratropium - albuterol 0.5 mg/2.5 mg/3 mL (DUONEB) 0.5-2.5 (3) MG/3ML neb solution INHALE 3ML IN NEBULIZER BY INHALATION FOUR TIMES A DAY AS NEEDED FOR SHORTNESS OF BREATH      ketorolac (ACULAR) 0.5 % ophthalmic solution Place 1 drop into the right eye 3 times daily 1 Bottle 1    loratadine (CLARITIN) 10 MG tablet 10 mg      mycophenolate, IDS 3865, (CELLCEPT) 250 MG capsule Take 2 tablets by mouth 2 times daily      OMEPRAZOLE PO Take 1 tablet by mouth daily      prednisoLONE 5 MG TABS Take 1 tablet by mouth daily      prednisoLONE acetate (PRED FORTE) 1 % ophthalmic suspension Place 1 drop into the right eye 3 times daily 1 Bottle 1    predniSONE (DELTASONE) 10 MG tablet Take 1 tablet by mouth daily      ranitidine (ZANTAC) 150 MG capsule Take 1 tablet by mouth daily      tacrolimus (PROGRAF-GENERIC EQUIVALENT) 5 MG capsule Take 5 mg by mouth  daily      tiotropium-olodaterol 2.5-2.5 MCG/ACT AERS INHALE 2 PUFFS BY INHALATION EVERY DAY FOR COPD      VITAMIN D, CHOLECALCIFEROL, PO Take 1 tablet by mouth once a week         Allergies:  No Known Allergies    Social history:   Social History     Tobacco Use    Smoking status: Former    Smokeless tobacco: Never   Substance Use Topics    Alcohol use: Not on file     Marital status: .    Physical Examination:  BP (!) 142/78 (BP Location: Right arm, Patient Position: Sitting, Cuff Size: Adult Regular)   Pulse 70   SpO2 100%   General: Well hydrated. No acute distress.  Abdomen: Soft, NT. No inguinal adenopathy palpated.    Investigations:  None.    Procedures:  None.    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  No clear evidence of extra-abdominal metastatic disease. We discussed the role and impact of cytoreductive surgery with hyperthermic intraperitoneal chemotherapy for metastatic colorectal cancer, including the potential need for several cycles of preoperative chemotherapy, intraoperative findings that may limit or preclude the extent of resection and delivery of intraperitoneal chemotherapy, postoperative complications and sequelae, likelihood of recurrent cancer, and quality of life. We also discussed recent grade IA data (Prodige 7 trial) showing no significant differences in overall survival with cytoreductive surgery with hyperthermic intraperitoneal chemotherapy versus cytoreductive surgery alone. The patient understands these well and agrees to proceed. All questions were answered to his satisfaction. Risks, benefits, and alternatives of operative treatment were thoroughly discussed with the patient, he/she understands these well and agrees to proceed.    PLAN  1.  Schedule full  set of labs with tumor markers including CEA, , and CA 19-9.  Also send prealbumin and transferrin.  2.  Please order MLH1 promoter gene testing.  3.  Schedule MR abdomen and pelvis (appendix protocol).  4.  Refer to medical oncology for likely systemic chemotherapy if presence of peritoneal carcinomatosis.  5.  Will discuss at multidisciplinary colorectal cancer conference.  6.  Berny is a an extremely complicated patient because of his comorbidities and poor functional status.  I am not sure he would tolerate extensive abdominal surgery, much less cytoreduction or HIPEC.  Very high risk for postoperative complications and permanent stoma which may affect his renal function as well.    60 minutes spent on the date of encounter performing chart review, history and exam, documentation.     Referring Provider:  Lluvia Kelley MD  [facility] SURGERY [address] [place] [address]     Primary Care Provider:  Cat Goodson      Again, thank you for allowing me to participate in the care of your patient.      Sincerely,    Mitchell Reid MD

## 2024-01-08 NOTE — NURSING NOTE
Chief Complaint   Patient presents with    Cancer     Cecal Cancer       Vitals:    01/08/24 1332   BP: (!) 142/78   BP Location: Right arm   Patient Position: Sitting   Cuff Size: Adult Regular   Pulse: 70   SpO2: 100%       There is no height or weight on file to calculate BMI.    Reta Ferris, CMA

## 2024-01-08 NOTE — PATIENT INSTRUCTIONS
Follow up:    Please call with any questions or concerns regarding your clinic visit today.    It is a pleasure being involved in your health care.    Contacts post-consultation depending on your need:    Radiology Appointments [phone]    Schedule Clinic Appointments [phone] # 1   M-F 7:30 - 5 pm    [facility] [phone]    Clinic Fax Number [phone]    Surgery Scheduling [phone]    My Chart is available 24 hours a day and is a secure way to access your records and communicate with your care team.  I strongly recommend signing up if you haven't already done so, if you are comfortable with computers.  If you would like to inquire about this or are having problems with My Chart access, you may call [phone] or go online at [email or web address].  Please allow at least 24 hours for a response and extra time on weekends and Holidays.

## 2024-01-11 ENCOUNTER — PRE VISIT (OUTPATIENT)
Dept: ONCOLOGY | Facility: CLINIC | Age: 74
End: 2024-01-11

## 2024-01-11 LAB
INTERPRETATION: NORMAL
LAB DIRECTOR COMMENTS: NORMAL
LAB DIRECTOR COMMENTS: NORMAL
LAB DIRECTOR DISCLAIMER: NORMAL
LAB DIRECTOR DISCLAIMER: NORMAL
LAB DIRECTOR INTERPRETATION: NORMAL
LAB DIRECTOR INTERPRETATION: NORMAL
LAB DIRECTOR METHODOLOGY: NORMAL
LAB DIRECTOR METHODOLOGY: NORMAL
LAB DIRECTOR RESULTS: NORMAL
LAB DIRECTOR RESULTS: NORMAL
SIGNIFICANT RESULTS: NORMAL
SPECIMEN DESCRIPTION: NORMAL
TEST DETAILS, MDL: NORMAL

## 2024-01-11 PROCEDURE — G0452 MOLECULAR PATHOLOGY INTERPR: HCPCS | Mod: 26 | Performed by: STUDENT IN AN ORGANIZED HEALTH CARE EDUCATION/TRAINING PROGRAM

## 2024-01-11 PROCEDURE — 81288 MLH1 GENE: CPT | Performed by: COLON & RECTAL SURGERY

## 2024-01-11 NOTE — TELEPHONE ENCOUNTER
RECORDS STATUS - ALL OTHER DIAGNOSIS      RECORDS RECEIVED FROM: Epic   DATE RECEIVED:    NOTES STATUS DETAILS   OFFICE NOTE from referring provider Epic 1/8/24: Dr. Mitchell Reid   OFFICE NOTE from other specialist [facility] 11[facility]:  Dr. Robledo    OPERATIVE REPORT External: [facility] 12/4/23: Colonscopy   MEDICATION LIST [facility]    LABS     PATHOLOGY REPORTS Report in [facility]/External: [facility] Path Consult:   12/26/23: [id number]     Surg Path:  12/4/23: [id number]    ANYTHING RELATED TO DIAGNOSIS Epic Most recent 12/26/23   IMAGING (NEED IMAGES & REPORT)     CT SCANS PACS 12/21/23: CT Chest Abd Pel

## 2024-01-18 ENCOUNTER — ONCOLOGY VISIT (OUTPATIENT)
Dept: ONCOLOGY | Facility: CLINIC | Age: 74
End: 2024-01-18
Attending: COLON & RECTAL SURGERY
Payer: COMMERCIAL

## 2024-01-18 VITALS
HEIGHT: 70 IN | TEMPERATURE: 98.4 F | RESPIRATION RATE: 18 BRPM | OXYGEN SATURATION: 98 % | DIASTOLIC BLOOD PRESSURE: 81 MMHG | SYSTOLIC BLOOD PRESSURE: 153 MMHG | BODY MASS INDEX: 30.68 KG/M2 | HEART RATE: 75 BPM | WEIGHT: 214.3 LBS

## 2024-01-18 DIAGNOSIS — D53.9 MACROCYTIC ANEMIA: ICD-10-CM

## 2024-01-18 DIAGNOSIS — C18.0 MALIGNANT NEOPLASM OF CECUM (H): Primary | ICD-10-CM

## 2024-01-18 LAB
ALBUMIN SERPL BCG-MCNC: 3.8 G/DL (ref 3.5–5.2)
ALP SERPL-CCNC: 77 U/L (ref 40–150)
ALT SERPL W P-5'-P-CCNC: 12 U/L (ref 0–70)
ANION GAP SERPL CALCULATED.3IONS-SCNC: 9 MMOL/L (ref 7–15)
AST SERPL W P-5'-P-CCNC: 18 U/L (ref 0–45)
BASOPHILS # BLD AUTO: 0 10E3/UL (ref 0–0.2)
BASOPHILS NFR BLD AUTO: 0 %
BILIRUB SERPL-MCNC: 0.3 MG/DL
BUN SERPL-MCNC: 28.8 MG/DL (ref 8–23)
CALCIUM SERPL-MCNC: 9.6 MG/DL (ref 8.8–10.2)
CHLORIDE SERPL-SCNC: 103 MMOL/L (ref 98–107)
CREAT SERPL-MCNC: 1.72 MG/DL (ref 0.67–1.17)
DEPRECATED HCO3 PLAS-SCNC: 26 MMOL/L (ref 22–29)
EGFRCR SERPLBLD CKD-EPI 2021: 41 ML/MIN/1.73M2
EOSINOPHIL # BLD AUTO: 0.1 10E3/UL (ref 0–0.7)
EOSINOPHIL NFR BLD AUTO: 1 %
ERYTHROCYTE [DISTWIDTH] IN BLOOD BY AUTOMATED COUNT: 15.9 % (ref 10–15)
FERRITIN SERPL-MCNC: 451 NG/ML (ref 31–409)
GLUCOSE SERPL-MCNC: 186 MG/DL (ref 70–99)
HCT VFR BLD AUTO: 35.7 % (ref 40–53)
HGB BLD-MCNC: 11.2 G/DL (ref 13.3–17.7)
IMM GRANULOCYTES # BLD: 0.1 10E3/UL
IMM GRANULOCYTES NFR BLD: 1 %
IRON BINDING CAPACITY (ROCHE): 179 UG/DL (ref 240–430)
IRON SATN MFR SERPL: 39 % (ref 15–46)
IRON SERPL-MCNC: 69 UG/DL (ref 61–157)
LYMPHOCYTES # BLD AUTO: 1.2 10E3/UL (ref 0.8–5.3)
LYMPHOCYTES NFR BLD AUTO: 19 %
MCH RBC QN AUTO: 30.9 PG (ref 26.5–33)
MCHC RBC AUTO-ENTMCNC: 31.4 G/DL (ref 31.5–36.5)
MCV RBC AUTO: 98 FL (ref 78–100)
MONOCYTES # BLD AUTO: 0.5 10E3/UL (ref 0–1.3)
MONOCYTES NFR BLD AUTO: 8 %
NEUTROPHILS # BLD AUTO: 4.5 10E3/UL (ref 1.6–8.3)
NEUTROPHILS NFR BLD AUTO: 71 %
NRBC # BLD AUTO: 0 10E3/UL
NRBC BLD AUTO-RTO: 0 /100
PLATELET # BLD AUTO: 172 10E3/UL (ref 150–450)
POTASSIUM SERPL-SCNC: 5.5 MMOL/L (ref 3.4–5.3)
PROT SERPL-MCNC: 7.1 G/DL (ref 6.4–8.3)
RBC # BLD AUTO: 3.63 10E6/UL (ref 4.4–5.9)
RETICS # AUTO: 0.11 10E6/UL (ref 0.03–0.1)
RETICS/RBC NFR AUTO: 3.1 % (ref 0.5–2)
SODIUM SERPL-SCNC: 138 MMOL/L (ref 135–145)
TSH SERPL DL<=0.005 MIU/L-ACNC: 2.77 UIU/ML (ref 0.3–4.2)
WBC # BLD AUTO: 6.4 10E3/UL (ref 4–11)

## 2024-01-18 PROCEDURE — 99204 OFFICE O/P NEW MOD 45 MIN: CPT | Performed by: INTERNAL MEDICINE

## 2024-01-18 PROCEDURE — 86301 IMMUNOASSAY TUMOR CA 19-9: CPT | Performed by: INTERNAL MEDICINE

## 2024-01-18 PROCEDURE — 82378 CARCINOEMBRYONIC ANTIGEN: CPT | Performed by: INTERNAL MEDICINE

## 2024-01-18 PROCEDURE — 82728 ASSAY OF FERRITIN: CPT | Performed by: INTERNAL MEDICINE

## 2024-01-18 PROCEDURE — 84134 ASSAY OF PREALBUMIN: CPT | Performed by: INTERNAL MEDICINE

## 2024-01-18 PROCEDURE — 80053 COMPREHEN METABOLIC PANEL: CPT | Performed by: INTERNAL MEDICINE

## 2024-01-18 PROCEDURE — 82747 ASSAY OF FOLIC ACID RBC: CPT | Performed by: INTERNAL MEDICINE

## 2024-01-18 PROCEDURE — 99213 OFFICE O/P EST LOW 20 MIN: CPT | Performed by: INTERNAL MEDICINE

## 2024-01-18 PROCEDURE — 83550 IRON BINDING TEST: CPT | Performed by: INTERNAL MEDICINE

## 2024-01-18 PROCEDURE — 84443 ASSAY THYROID STIM HORMONE: CPT | Performed by: INTERNAL MEDICINE

## 2024-01-18 PROCEDURE — 83540 ASSAY OF IRON: CPT | Performed by: INTERNAL MEDICINE

## 2024-01-18 PROCEDURE — 36415 COLL VENOUS BLD VENIPUNCTURE: CPT | Performed by: INTERNAL MEDICINE

## 2024-01-18 PROCEDURE — 86304 IMMUNOASSAY TUMOR CA 125: CPT | Performed by: INTERNAL MEDICINE

## 2024-01-18 PROCEDURE — 85025 COMPLETE CBC W/AUTO DIFF WBC: CPT | Performed by: INTERNAL MEDICINE

## 2024-01-18 PROCEDURE — G0452 MOLECULAR PATHOLOGY INTERPR: HCPCS | Mod: 26 | Performed by: PATHOLOGY

## 2024-01-18 PROCEDURE — 82607 VITAMIN B-12: CPT | Performed by: INTERNAL MEDICINE

## 2024-01-18 PROCEDURE — 85045 AUTOMATED RETICULOCYTE COUNT: CPT | Performed by: INTERNAL MEDICINE

## 2024-01-18 PROCEDURE — 84466 ASSAY OF TRANSFERRIN: CPT | Performed by: INTERNAL MEDICINE

## 2024-01-18 PROCEDURE — 81455 SO/HL 51/>GSAP DNA/DNA&RNA: CPT | Performed by: INTERNAL MEDICINE

## 2024-01-18 RX ORDER — MAGNESIUM OXIDE 420 MG/1
1 TABLET ORAL 2 TIMES DAILY
COMMUNITY
Start: 2023-11-08

## 2024-01-18 RX ORDER — TAMSULOSIN HYDROCHLORIDE 0.4 MG/1
0.4 CAPSULE ORAL DAILY
COMMUNITY

## 2024-01-18 ASSESSMENT — PAIN SCALES - GENERAL: PAINLEVEL: NO PAIN (0)

## 2024-01-18 NOTE — LETTER
1/18/2024         RE: Berny Estrada  [address] [facility] [address]      [facility]    Hematology/Oncology New Patient Note      Today's Date: January 18, 2024     Referring provider: Mitchell Reid MD   Reason for Consultation: likely systemic chemotherapy, cecal adenocarcinoma, possible PC     HISTORY OF PRESENT ILLNESS: Berny Estrada is a 73 year old male who was referred to the Hematology/Oncology Clinic for likely systemic chemotherapy, cecal adenocarcinoma, possible PC     Patient has medical history including right renal transplant (2014), hypertension, type 2 diabetes with proliferative diabetic retinopathy and long-term insulin use, CKD stage III, CAD, COPD, GERD, colon polyps (tubular adenoma), esophageal candidiasis, cholelithiasis, sleep apnea, hx of tobacco use (quit 50 years ago)    -12/1/23- 12/9/23 hospitalized 2/2 anemia, hgb 7.9 w/melena x few months (was on iron)  - 12/6/23 CEA 11.69, 12/7/23 CEA 10  - 12/14/2023 EGD: Patchy white plaques in proximal esophagus and midesophagus suspicious for candidiasis, otherwise normal  -12/14/2023 [facility]:  - Fungating partially obstructing, partially circumferential (involving two thirds of the lumen circumference) large mass found in the cecum, including IC valve, with oozing  - 4 sessile polyps in ascending colon, 3-5 mm in size  - 2 mm sessile polyp in transverse colon  - 2 sessile polyps in the ascending colon, 4-5 mm in size  - 6 sessile polyps in the rectosigmoid colon, 3-8 mm in size  - Medium sized external hemorrhoids  PATHOLOGY:  1.  MID ESOPHAGUS, BIOPSY:  - Squamous mucosa with superficial colonization by fungal organisms positive for PAS stain, morphologically compatible with Candida species    2.  COLON, CECUM MASS, BIOPSY:  - Adenocarcinoma, moderately differentiated  - Loss of expression of MLH1 and PMS2; intact expression of MSH2  and MSH6   - MLH1 promoter methylation: POSITIVE   - BRAF V600 mutation positive     3.  ASCENDING COLON, POLYPECTOMY:  - Sessile serrated adenoma with focal high-grade dysplasia  - Separate minute fragment of adenocarcinoma     4.  TRANSVERSE COLON, POLYPECTOMY:  - Tubular adenoma; negative for high-grade dysplasia     5.  DESCENDING COLON, POLYPECTOMY:  - Tubular adenoma; negative for high-grade dysplasia     6.  RECTOSIGMOID COLON, POLYPECTOMY:  - Sessile serrated adenoma with focal cytologic dysplasia  - No evidence of high-grade dysplasia or invasive carcinoma    -12/21/23 CT CAP without contrast:  -Cardiomegaly, coronary artery stenting calcifications  - Dilated main pulmonary artery, 3.8 cm  - Calcified hilar lymphadenopathy  - RML and lingular bronchiectatic changes with associated fibroatelectasis, may be secondary to chronic infectious/inflammatory process, Ill-defined groundglass opacities predominantly affecting DERIC and lingula, Dependent atelectasis  - Small left-sided pleural effusion  -Atrophic thyroid with ill-defined subcentimeter nodularity  - Cholelithiasis  - Atrophic appearance of native kidneys, right lower quadrant renal transplant with mild hydronephrosis  - 1.6 cm hyperattenuating lesion of left kidney  - Ill-defined soft tissue thickening about cecum, likely corresponding to known cecal mass  - Associated thickening of peritoneal planes in the region  - Enlarged 2.1 x 1.7 cm mesenteric node adjacent to cecum  - Borderline enlarged 0.9 cm perirectal node  IMPRESSION:   1. Cecal mass with enlarged nodes of the right lower quadrant  including a 2.1 x 1.7 cm lymph node, which are suspicious for  metastatic disease. Indeterminate thickening and nodularity of the  peritoneal surfaces near the cecal mass could be due to fluid or  mesenteric spread of disease.  3. Indeterminate 1.6 cm lesion of the left native kidney. Consider  dedicated renal MRI with contrast for further characterization.  4.  Right lower quadrant renal transplant with mild hydronephrosis.  5. Ill-defined groundglass opacities of the left upper lobe and  lingula suspicious for an infectious/inflammatory etiology. Additional  bronchiectatic and fibroatelectatic changes of the right middle lobe  and lingula suggesting a chronic inflammatory process.  6. Small left-sided pleural effusion.  7. Dilated main pulmonary artery as can be seen with pulmonary  arterial hypertension.  8. Cholelithiasis without findings to suggest acute cholecystitis.    -12/21/23 CEA 31.3      Pt denies change in stool caliber, diarrhea, abdominal pain, abdominal distention, nausea, vomiting, no weight loss. Stool is dark (on iron), reported to be black, no BRBPR, no clots. Pt reports weight loss 10/23-11/23 10-15 lbs, then gain 10 over past few months    Pt reports right leg swelling 12/23 onwards with some improvement and palpated knot in left leg. B/l LE doppler 12/8/23 negative for DVT    Regarding ECOG:  Significant decline over the past 6 months, currently using wheelchair and supplemental oxygen  Working with PT 2x/week  Uses cane and walker prn recently, a few months ago was more consistent  Wife helps with most ADLs (cooking, cleaning, groceries, driving). Is able to go to bathroom, shower and dress himself.  Spends >50% of day in recliner    Family history:  Brother- colorectal cancer, age 61, s/p surgery, receiving chemotherapy      Regarding kidney transplant  - right kidney transplant 2014  - on tacrolimus and prednisone 10 mg daily (off cellcept since 12/23)  - 12/23 Cr 1.6, GFR 45        REVIEW OF SYSTEMS:   A 14 point ROS was reviewed with pertinent positives and negatives in the HPI.        HOME MEDICATIONS:  Current Outpatient Medications   Medication Sig Dispense Refill     albuterol (PROAIR HFA/PROVENTIL HFA/VENTOLIN HFA) 108 (90 Base) MCG/ACT inhaler INHALE 2 PUFFS BY INHALATION EVERY 6 HOURS AS NEEDED FOR SHORTNESS OF BREATH, FOR SECRETIONS        amLODIPine (NORVASC) 10 MG tablet Take 10 mg by mouth daily       carvedilol (COREG) 12.5 MG tablet Take 12.5 mg by mouth 2 times daily       insulin aspart U-10, diluted conc 10 units/mL, (NOVOLOG) 10 unit/ml injection Inject Subcutaneous 3 times daily (before meals) 10-16 units       INSULIN GLARGINE SC Inject 460 Units Subcutaneous At Bedtime 7/11 am       ipratropium - albuterol 0.5 mg/2.5 mg/3 mL (DUONEB) 0.5-2.5 (3) MG/3ML neb solution INHALE 3ML IN NEBULIZER BY INHALATION FOUR TIMES A DAY AS NEEDED FOR SHORTNESS OF BREATH       loratadine (CLARITIN) 10 MG tablet 10 mg       Magnesium Oxide 420 MG TABS Take 1 tablet by mouth 2 times daily       OMEPRAZOLE PO Take 1 tablet by mouth daily       predniSONE (DELTASONE) 10 MG tablet Take 1 tablet by mouth daily       tacrolimus (PROGRAF-GENERIC EQUIVALENT) 5 MG capsule Take 5 mg by mouth daily       tamsulosin (FLOMAX) 0.4 MG capsule Take 0.4 mg by mouth daily       tiotropium-olodaterol 2.5-2.5 MCG/ACT AERS INHALE 2 PUFFS BY INHALATION EVERY DAY FOR COPD       VITAMIN D, CHOLECALCIFEROL, PO Take 1 tablet by mouth once a week       ASPIRIN PO Take 81 mg by mouth       fluconazole (DIFLUCAN) 200 MG tablet 200 mg       ketorolac (ACULAR) 0.5 % ophthalmic solution Place 1 drop into the right eye 3 times daily 1 Bottle 1     mycophenolate, IDS 3865, (CELLCEPT) 250 MG capsule Take 2 tablets by mouth 2 times daily (Patient not taking: Reported on 1/18/2024)       prednisoLONE 5 MG TABS Take 1 tablet by mouth daily (Patient not taking: Reported on 1/18/2024)       prednisoLONE acetate (PRED FORTE) 1 % ophthalmic suspension Place 1 drop into the right eye 3 times daily 1 Bottle 1     ranitidine (ZANTAC) 150 MG capsule Take 1 tablet by mouth daily           ALLERGIES:  No Known Allergies      PAST MEDICAL HISTORY:  Past Medical History:   Diagnosis Date     Diabetic retinopathy (H)      GERD (gastroesophageal reflux disease)      Hypertension      Renal disease      "renal transplant     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     Sleep apnea     has equipment  not always uses     Type 2 diabetes mellitus without complications (H)          PAST SURGICAL HISTORY:  Past Surgical History:   Procedure Laterality Date     EYE SURGERY  7/12/2016    CE IOL PPV RE     PANRETINAL PHOTOCOAGULATION (PRP) OD (RIGHT EYE)      last 9/18/13     PANRETINAL PHOTOCOAGULATION (PRP) OS (LEFT EYE)  4/15/14    [facility]     PHACOEMULSIFICATION CLEAR CORNEA WITH STANDARD IOL, VITRECTOMY PARSPLANA 25 GAGUE, COMBINED Right 7/12/2016    Procedure: COMBINED PHACOEMULSIFICATION CLEAR CORNEA WITH STANDARD INTRAOCULAR LENS IMPLANT, VITRECTOMY PARSPLANA 25 GAUGE;  Surgeon: Mireille Don MD;  Location: [facility]         SOCIAL HISTORY:  Social History     Socioeconomic History     Marital status:      Spouse name: Not on file     Number of children: Not on file     Years of education: Not on file     Highest education level: Not on file   Occupational History     Not on file   Tobacco Use     Smoking status: Former     Smokeless tobacco: Never   Substance and Sexual Activity     Alcohol use: Not on file     Drug use: Not on file     Sexual activity: Not on file   Other Topics Concern     Not on file   Social History Narrative     Not on file     Social Determinants of Health     Financial Resource Strain: Not on file   Food Insecurity: Not on file   Transportation Needs: Not on file   Physical Activity: Not on file   Stress: Not on file   Social Connections: Not on file   Interpersonal Safety: Not on file   Housing Stability: Not on file         FAMILY HISTORY:  Family History   Problem Relation Age of Onset     Glaucoma No family hx of      Macular Degeneration No family hx of          PHYSICAL EXAM:  Vital signs:  BP (!) 153/81   Pulse 75   Temp 98.4  F (36.9  C)   Resp 18   Ht 1.778 m (5' 10\")   Wt 97.2 kg (214 lb 4.8 oz)   SpO2 98%   BMI 30.75 kg/m       ECOG: " "3  GENERAL/CONSTITUTIONAL: No acute distress. In wheelchair  EYES: Pupils are equal and round. Extraocular movements intact without nystagmus.  No scleral icterus.  RESPIRATORY: Equal chest rise.   MUSCULOSKELETAL: Warm and well-perfused, no cyanosis, clubbing. Right LE 2+ pitting edema to knee, left LE 1+ pitting edema to mid tibia  NEUROLOGIC: Cranial nerves are grossly intact. Alert, oriented to person, place and time, answers questions appropriately.  INTEGUMENTARY: No rashes or jaundice. Left tibia hyperpigmentation           LABS:  CBC RESULTS: No results for input(s): \"WBC\", \"RBC\", \"HGB\", \"HCT\", \"MCV\", \"MCH\", \"MCHC\", \"RDW\", \"PLT\" in the last 56624 hours.    No results for input(s): \"NA\", \"POTASSIUM\", \"CHLORIDE\", \"CO2\", \"ANIONGAP\", \"GLC\", \"BUN\", \"CR\", \"VIVI\" in the last 35233 hours.      PATHOLOGY:      IMAGING:      ASSESSMENT/PLAN:  Berny Estrada is a 73 year old male with:      # moderately differentiated cecum adenocarcinoma, MSI-high (loss of MLH1 and PMS2, MLH1 promoter hypermethylated), BRAF V600 mutation positive  -12/14/23 diagnostic colonoscopy: Fungating partially obstructing, partially circumferential (involving two thirds of the lumen circumference) large mass found in the cecum, including IC valve, with oozing, PATHOLOGY: COLON, CECUM MASS, BIOPSY: Adenocarcinoma, moderately differentiated, Loss of expression of MLH1 and PMS2; intact expression of MSH2 and MSH6, MLH1 promoter methylation: POSITIVE ; BRAF V600 mutation positive  -12/21/23 CT CAP without contrast: Cecal mass with enlarged nodes of the right lower quadrant  including a 2.1 x 1.7 cm lymph node, which are suspicious for metastatic disease. Indeterminate thickening and nodularity of the peritoneal surfaces near the cecal mass could be due to fluid or  mesenteric spread of disease. Indeterminate 1.6 cm lesion of the left native kidney. Small left-sided pleural effusion.  -12/7/23 CEA 10,  12/21/23 CEA 31.3    PLAN:  - ECOG 3 " currently, ECOG 2 at baseline  - I am concerned the patient has metastatic disease with peritoneal carcinomatosis. Overall, right sided tumors w/BRAF mutation tend to have poorer prognosis. 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 If pt was to receive 5FU based regimen, it could include ie. 5FU, LV, +/- avastin but would have to monitor renal function closely and for QTC prolongation w/concurrent tacrolimus tx for renal transplant  - would need to discuss the above regimen with pts nephrologist prior to proceeding with any possible tx  - though immunotherapy can play a role in MSI-high colon cancer treatment upfront, pt is on immunosuppressive therapy for renal transplant   - multidisciplinary colorectal tumor board 1/29/24  - MRI pending 1/31/24, will try to move this up to before tumor board  - check baseline CBC, CMP  - check colorectal NGS  - monitor for s/s of obstruction      #macrocytic anemia  - per Care Everywhere labs:  12/23 labs  Hgb 7.9-8.9, MCV   Ferritin 1827, iron sat 60, TIBC 111, iron 67  Haptoglobin 177, absolute retic 0.1011    PLAN:  - check the following labs:  CBC  Ferritin, iron studies  B12, RBC folate  TSH  CMP, retic    #renal transplant  - [facility]  -  S/p cadaveric transplant in [facility] in 2014   - on tacrolimus and prednisone 10 mg daily (off cellcept since 12/23)  - 12/23 Cr 1.6, GFR 45    #type 2 diabetes  - avg BS 230s    # RLE swelling  - B/l LE doppler 12/8/23 negative for DVT  - monitor for s/s DVT    RTC 3 weeks for follow up with mago Blunt,   Hematology/Oncology  [facility]    Future Appointments   Date Time Provider  Department Center   1/18/2024 10:45 AM Stacey Bell DO [facility]   1/29/2024 12:15 PM COLORECTAL TUMOR CONFERENCE [facility]   1/31/2024  7:45 AM [facility] [place]   1/31/2024  9:20 AM LAB FIRST FLOOR [facility] DO

## 2024-01-18 NOTE — NURSING NOTE
"Oncology Rooming Note    January 18, 2024 10:50 AM   Berny Estrada is a 73 year old male who presents for:    Chief Complaint   Patient presents with    Oncology Clinic Visit     New Patient      Initial Vitals: BP (!) 153/81   Pulse 75   Temp 98.4  F (36.9  C)   Resp 18   Ht 1.778 m (5' 10\")   Wt 97.2 kg (214 lb 4.8 oz)   SpO2 98%   BMI 30.75 kg/m   Estimated body mass index is 30.75 kg/m  as calculated from the following:    Height as of this encounter: 1.778 m (5' 10\").    Weight as of this encounter: 97.2 kg (214 lb 4.8 oz). Body surface area is 2.19 meters squared.  No Pain (0) Comment: Data Unavailable   No LMP for male patient.  Allergies reviewed: Yes  Medications reviewed: Yes    Medications: Medication refills not needed today.  Pharmacy name entered into EPIC: [facility] - [place], [place] - [facility]    Frailty Screening:   Is the patient here for a new oncology consult visit in cancer care? 1. Yes. Over the past month, have you experienced difficulty or required a caregiver to assist with:   1. Balance, walking or general mobility (including any falls)? YES  2. Completion of self-care tasks such as bathing, dressing, toileting, grooming/hygiene?  NO  3. Concentration or memory that affects your daily life?  NO       Clinical concerns: Patient will discuss concerns with provider       Mara Ferris MA            "

## 2024-01-18 NOTE — LETTER
1/18/2024         RE: Berny Estrada  [address] [facility] [address]        Dear Colleague,    Thank you for referring your patient, Berny Estrada, to the [facility]. Please see a copy of my visit note below.    [facility]    Hematology/Oncology New Patient Note      Today's Date: January 18, 2024     Referring provider: Mitchell Reid MD   Reason for Consultation: likely systemic chemotherapy, cecal adenocarcinoma, possible PC     HISTORY OF PRESENT ILLNESS: Berny Estrada is a 73 year old male who was referred to the Hematology/Oncology Clinic for likely systemic chemotherapy, cecal adenocarcinoma, possible PC     Patient has medical history including right renal transplant (2014), hypertension, type 2 diabetes with proliferative diabetic retinopathy and long-term insulin use, CKD stage III, CAD, COPD, GERD, colon polyps (tubular adenoma), esophageal candidiasis, cholelithiasis, sleep apnea, hx of tobacco use (quit 50 years ago)    -12/1/23- 12/9/23 hospitalized 2/2 anemia, hgb 7.9 w/melena x few months (was on iron)  - 12/6/23 CEA 11.69, 12/7/23 CEA 10  - 12/14/2023 EGD: Patchy white plaques in proximal esophagus and midesophagus suspicious for candidiasis, otherwise normal  -12/14/2023 [facility]:  - Fungating partially obstructing, partially circumferential (involving two thirds of the lumen circumference) large mass found in the cecum, including IC valve, with oozing  - 4 sessile polyps in ascending colon, 3-5 mm in size  - 2 mm sessile polyp in transverse colon  - 2 sessile polyps in the ascending colon, 4-5 mm in size  - 6 sessile polyps in the rectosigmoid colon, 3-8 mm in size  - Medium sized external hemorrhoids  PATHOLOGY:  1.  MID ESOPHAGUS, BIOPSY:  - Squamous mucosa with superficial colonization by fungal organisms positive for PAS stain, morphologically  compatible with Candida species    2.  COLON, CECUM MASS, BIOPSY:  - Adenocarcinoma, moderately differentiated  - Loss of expression of MLH1 and PMS2; intact expression of MSH2 and MSH6   - MLH1 promoter methylation: POSITIVE   - BRAF V600 mutation positive     3.  ASCENDING COLON, POLYPECTOMY:  - Sessile serrated adenoma with focal high-grade dysplasia  - Separate minute fragment of adenocarcinoma     4.  TRANSVERSE COLON, POLYPECTOMY:  - Tubular adenoma; negative for high-grade dysplasia     5.  DESCENDING COLON, POLYPECTOMY:  - Tubular adenoma; negative for high-grade dysplasia     6.  RECTOSIGMOID COLON, POLYPECTOMY:  - Sessile serrated adenoma with focal cytologic dysplasia  - No evidence of high-grade dysplasia or invasive carcinoma    -12/21/23 CT CAP without contrast:  -Cardiomegaly, coronary artery stenting calcifications  - Dilated main pulmonary artery, 3.8 cm  - Calcified hilar lymphadenopathy  - RML and lingular bronchiectatic changes with associated fibroatelectasis, may be secondary to chronic infectious/inflammatory process, Ill-defined groundglass opacities predominantly affecting DERIC and lingula, Dependent atelectasis  - Small left-sided pleural effusion  -Atrophic thyroid with ill-defined subcentimeter nodularity  - Cholelithiasis  - Atrophic appearance of native kidneys, right lower quadrant renal transplant with mild hydronephrosis  - 1.6 cm hyperattenuating lesion of left kidney  - Ill-defined soft tissue thickening about cecum, likely corresponding to known cecal mass  - Associated thickening of peritoneal planes in the region  - Enlarged 2.1 x 1.7 cm mesenteric node adjacent to cecum  - Borderline enlarged 0.9 cm perirectal node  IMPRESSION:   1. Cecal mass with enlarged nodes of the right lower quadrant  including a 2.1 x 1.7 cm lymph node, which are suspicious for  metastatic disease. Indeterminate thickening and nodularity of the  peritoneal surfaces near the cecal mass could be due to  fluid or  mesenteric spread of disease.  3. Indeterminate 1.6 cm lesion of the left native kidney. Consider  dedicated renal MRI with contrast for further characterization.  4. Right lower quadrant renal transplant with mild hydronephrosis.  5. Ill-defined groundglass opacities of the left upper lobe and  lingula suspicious for an infectious/inflammatory etiology. Additional  bronchiectatic and fibroatelectatic changes of the right middle lobe  and lingula suggesting a chronic inflammatory process.  6. Small left-sided pleural effusion.  7. Dilated main pulmonary artery as can be seen with pulmonary  arterial hypertension.  8. Cholelithiasis without findings to suggest acute cholecystitis.    -12/21/23 CEA 31.3      Pt denies change in stool caliber, diarrhea, abdominal pain, abdominal distention, nausea, vomiting, no weight loss. Stool is dark (on iron), reported to be black, no BRBPR, no clots. Pt reports weight loss 10/23-11/23 10-15 lbs, then gain 10 over past few months    Pt reports right leg swelling 12/23 onwards with some improvement and palpated knot in left leg. B/l LE doppler 12/8/23 negative for DVT    Regarding ECOG:  Significant decline over the past 6 months, currently using wheelchair and supplemental oxygen  Working with PT 2x/week  Uses cane and walker prn recently, a few months ago was more consistent  Wife helps with most ADLs (cooking, cleaning, groceries, driving). Is able to go to bathroom, shower and dress himself.  Spends >50% of day in recliner    Family history:  Brother- colorectal cancer, age 61, s/p surgery, receiving chemotherapy      Regarding kidney transplant  - right kidney transplant 2014  - on tacrolimus and prednisone 10 mg daily (off cellcept since 12/23)  - 12/23 Cr 1.6, GFR 45        REVIEW OF SYSTEMS:   A 14 point ROS was reviewed with pertinent positives and negatives in the HPI.        HOME MEDICATIONS:  Current Outpatient Medications   Medication Sig Dispense Refill      albuterol (PROAIR HFA/PROVENTIL HFA/VENTOLIN HFA) 108 (90 Base) MCG/ACT inhaler INHALE 2 PUFFS BY INHALATION EVERY 6 HOURS AS NEEDED FOR SHORTNESS OF BREATH, FOR SECRETIONS       amLODIPine (NORVASC) 10 MG tablet Take 10 mg by mouth daily       carvedilol (COREG) 12.5 MG tablet Take 12.5 mg by mouth 2 times daily       insulin aspart U-10, diluted conc 10 units/mL, (NOVOLOG) 10 unit/ml injection Inject Subcutaneous 3 times daily (before meals) 10-16 units       INSULIN GLARGINE SC Inject 460 Units Subcutaneous At Bedtime 7/11 am       ipratropium - albuterol 0.5 mg/2.5 mg/3 mL (DUONEB) 0.5-2.5 (3) MG/3ML neb solution INHALE 3ML IN NEBULIZER BY INHALATION FOUR TIMES A DAY AS NEEDED FOR SHORTNESS OF BREATH       loratadine (CLARITIN) 10 MG tablet 10 mg       Magnesium Oxide 420 MG TABS Take 1 tablet by mouth 2 times daily       OMEPRAZOLE PO Take 1 tablet by mouth daily       predniSONE (DELTASONE) 10 MG tablet Take 1 tablet by mouth daily       tacrolimus (PROGRAF-GENERIC EQUIVALENT) 5 MG capsule Take 5 mg by mouth daily       tamsulosin (FLOMAX) 0.4 MG capsule Take 0.4 mg by mouth daily       tiotropium-olodaterol 2.5-2.5 MCG/ACT AERS INHALE 2 PUFFS BY INHALATION EVERY DAY FOR COPD       VITAMIN D, CHOLECALCIFEROL, PO Take 1 tablet by mouth once a week       ASPIRIN PO Take 81 mg by mouth       fluconazole (DIFLUCAN) 200 MG tablet 200 mg       ketorolac (ACULAR) 0.5 % ophthalmic solution Place 1 drop into the right eye 3 times daily 1 Bottle 1     mycophenolate, IDS 3865, (CELLCEPT) 250 MG capsule Take 2 tablets by mouth 2 times daily (Patient not taking: Reported on 1/18/2024)       prednisoLONE 5 MG TABS Take 1 tablet by mouth daily (Patient not taking: Reported on 1/18/2024)       prednisoLONE acetate (PRED FORTE) 1 % ophthalmic suspension Place 1 drop into the right eye 3 times daily 1 Bottle 1     ranitidine (ZANTAC) 150 MG capsule Take 1 tablet by mouth daily           ALLERGIES:  No Known Allergies      PAST  MEDICAL HISTORY:  Past Medical History:   Diagnosis Date     Diabetic retinopathy (H)      GERD (gastroesophageal reflux disease)      Hypertension      Renal disease     renal transplant     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     Sleep apnea     has equipment  not always uses     Type 2 diabetes mellitus without complications (H)          PAST SURGICAL HISTORY:  Past Surgical History:   Procedure Laterality Date     EYE SURGERY  7/12/2016    CE IOL PPV RE     PANRETINAL PHOTOCOAGULATION (PRP) OD (RIGHT EYE)      last 9/18/13     PANRETINAL PHOTOCOAGULATION (PRP) OS (LEFT EYE)  4/15/14    [facility]     PHACOEMULSIFICATION CLEAR CORNEA WITH STANDARD IOL, VITRECTOMY PARSPLANA 25 GAGUE, COMBINED Right 7/12/2016    Procedure: COMBINED PHACOEMULSIFICATION CLEAR CORNEA WITH STANDARD INTRAOCULAR LENS IMPLANT, VITRECTOMY PARSPLANA 25 GAUGE;  Surgeon: Mireille Don MD;  Location: [facility]         SOCIAL HISTORY:  Social History     Socioeconomic History     Marital status:      Spouse name: Not on file     Number of children: Not on file     Years of education: Not on file     Highest education level: Not on file   Occupational History     Not on file   Tobacco Use     Smoking status: Former     Smokeless tobacco: Never   Substance and Sexual Activity     Alcohol use: Not on file     Drug use: Not on file     Sexual activity: Not on file   Other Topics Concern     Not on file   Social History Narrative     Not on file     Social Determinants of Health     Financial Resource Strain: Not on file   Food Insecurity: Not on file   Transportation Needs: Not on file   Physical Activity: Not on file   Stress: Not on file   Social Connections: Not on file   Interpersonal Safety: Not on file   Housing Stability: Not on file         FAMILY HISTORY:  Family History   Problem Relation Age of Onset     Glaucoma No family hx of      Macular Degeneration No family hx of          PHYSICAL EXAM:  Vital signs:  BP (!)  "153/81   Pulse 75   Temp 98.4  F (36.9  C)   Resp 18   Ht 1.778 m (5' 10\")   Wt 97.2 kg (214 lb 4.8 oz)   SpO2 98%   BMI 30.75 kg/m       ECOG: 3  GENERAL/CONSTITUTIONAL: No acute distress. In wheelchair  EYES: Pupils are equal and round. Extraocular movements intact without nystagmus.  No scleral icterus.  RESPIRATORY: Equal chest rise.   MUSCULOSKELETAL: Warm and well-perfused, no cyanosis, clubbing. Right LE 2+ pitting edema to knee, left LE 1+ pitting edema to mid tibia  NEUROLOGIC: Cranial nerves are grossly intact. Alert, oriented to person, place and time, answers questions appropriately.  INTEGUMENTARY: No rashes or jaundice. Left tibia hyperpigmentation           LABS:  CBC RESULTS: No results for input(s): \"WBC\", \"RBC\", \"HGB\", \"HCT\", \"MCV\", \"MCH\", \"MCHC\", \"RDW\", \"PLT\" in the last 37832 hours.    No results for input(s): \"NA\", \"POTASSIUM\", \"CHLORIDE\", \"CO2\", \"ANIONGAP\", \"GLC\", \"BUN\", \"CR\", \"VIVI\" in the last 57627 hours.      PATHOLOGY:      IMAGING:      ASSESSMENT/PLAN:  Berny Estrada is a 73 year old male with:      # moderately differentiated cecum adenocarcinoma, MSI-high (loss of MLH1 and PMS2, MLH1 promoter hypermethylated), BRAF V600 mutation positive  -12/14/23 diagnostic colonoscopy: Fungating partially obstructing, partially circumferential (involving two thirds of the lumen circumference) large mass found in the cecum, including IC valve, with oozing, PATHOLOGY: COLON, CECUM MASS, BIOPSY: Adenocarcinoma, moderately differentiated, Loss of expression of MLH1 and PMS2; intact expression of MSH2 and MSH6, MLH1 promoter methylation: POSITIVE ; BRAF V600 mutation positive  -12/21/23 CT CAP without contrast: Cecal mass with enlarged nodes of the right lower quadrant  including a 2.1 x 1.7 cm lymph node, which are suspicious for metastatic disease. Indeterminate thickening and nodularity of the peritoneal surfaces near the cecal mass could be due to fluid or  mesenteric spread of disease. " Indeterminate 1.6 cm lesion of the left native kidney. Small left-sided pleural effusion.  -12/7/23 CEA 10,  12/21/23 CEA 31.3    PLAN:  - ECOG 3 currently, ECOG 2 at baseline  - I am concerned the patient has metastatic disease with peritoneal carcinomatosis. Overall, right sided tumors w/BRAF mutation tend to have poorer prognosis. 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 If pt was to receive 5FU based regimen, it could include ie. 5FU, LV, +/- avastin but would have to monitor renal function closely and for QTC prolongation w/concurrent tacrolimus tx for renal transplant  - would need to discuss the above regimen with pts nephrologist prior to proceeding with any possible tx  - though immunotherapy can play a role in MSI-high colon cancer treatment upfront, pt is on immunosuppressive therapy for renal transplant   - multidisciplinary colorectal tumor board 1/29/24  - MRI pending 1/31/24, will try to move this up to before tumor board  - check baseline CBC, CMP  - check colorectal NGS  - monitor for s/s of obstruction      #macrocytic anemia  - per [facility] Everywhere labs:  12/23 labs  Hgb 7.9-8.9, MCV   Ferritin 1827, iron sat 60, TIBC 111, iron 67  Haptoglobin 177, absolute retic 0.1011    PLAN:  - check the following labs:  CBC  Ferritin, iron studies  B12, RBC folate  TSH  CMP, retic    #renal transplant  - [facility]  -  S/p cadaveric transplant in [facility] in 2014   - on tacrolimus and prednisone 10 mg daily (off cellcept since 12/23)  - 12/23 Cr 1.6, GFR 45    #type 2 diabetes  - avg BS 230s    # RLE swelling  - B/l LE doppler 12/8/23 negative for DVT  - monitor for s/s DVT    RTC 3 weeks for  follow up with me      Shaunna Bell DO  Hematology/Oncology  [facility]    Future Appointments   Date Time Provider Department Center   1/18/2024 10:45 AM Shaunna Bell DO [facility] [place]   1/29/2024 12:15 PM COLORECTAL TUMOR CONFERENCE [facility]   1/31/2024  7:45 AM [facility] [place]   1/31/2024  9:20 AM LAB FIRST FLOOR [facility] [place]          Again, thank you for allowing me to participate in the care of your patient.        Sincerely,        SHAUNNA BELL DO

## 2024-01-18 NOTE — PROGRESS NOTES
[facility]    Hematology/Oncology New Patient Note      Today's Date: January 18, 2024     Referring provider: Mitchell Reid MD   Reason for Consultation: likely systemic chemotherapy, cecal adenocarcinoma, possible PC     HISTORY OF PRESENT ILLNESS: Berny Estrada is a 73 year old male who was referred to the Hematology/Oncology Clinic for likely systemic chemotherapy, cecal adenocarcinoma, possible PC     Patient has medical history including right renal transplant (2014), hypertension, type 2 diabetes with proliferative diabetic retinopathy and long-term insulin use, CKD stage III, CAD, COPD, GERD, colon polyps (tubular adenoma), esophageal candidiasis, cholelithiasis, sleep apnea, hx of tobacco use (quit 50 years ago)    -12/1/23- 12/9/23 hospitalized 2/2 anemia, hgb 7.9 w/melena x few months (was on iron)  - 12/6/23 CEA 11.69, 12/7/23 CEA 10  - 12/14/2023 EGD: Patchy white plaques in proximal esophagus and midesophagus suspicious for candidiasis, otherwise normal  -12/14/2023 [facility]:  - Fungating partially obstructing, partially circumferential (involving two thirds of the lumen circumference) large mass found in the cecum, including IC valve, with oozing  - 4 sessile polyps in ascending colon, 3-5 mm in size  - 2 mm sessile polyp in transverse colon  - 2 sessile polyps in the ascending colon, 4-5 mm in size  - 6 sessile polyps in the rectosigmoid colon, 3-8 mm in size  - Medium sized external hemorrhoids  PATHOLOGY:  1.  MID ESOPHAGUS, BIOPSY:  - Squamous mucosa with superficial colonization by fungal organisms positive for PAS stain, morphologically compatible with Candida species    2.  COLON, CECUM MASS, BIOPSY:  - Adenocarcinoma, moderately differentiated  - Loss of expression of MLH1 and PMS2; intact expression of MSH2 and MSH6   - MLH1 promoter methylation: POSITIVE   - BRAF V600 mutation positive     3.   ASCENDING COLON, POLYPECTOMY:  - Sessile serrated adenoma with focal high-grade dysplasia  - Separate minute fragment of adenocarcinoma     4.  TRANSVERSE COLON, POLYPECTOMY:  - Tubular adenoma; negative for high-grade dysplasia     5.  DESCENDING COLON, POLYPECTOMY:  - Tubular adenoma; negative for high-grade dysplasia     6.  RECTOSIGMOID COLON, POLYPECTOMY:  - Sessile serrated adenoma with focal cytologic dysplasia  - No evidence of high-grade dysplasia or invasive carcinoma    -12/21/23 CT CAP without contrast:  -Cardiomegaly, coronary artery stenting calcifications  - Dilated main pulmonary artery, 3.8 cm  - Calcified hilar lymphadenopathy  - RML and lingular bronchiectatic changes with associated fibroatelectasis, may be secondary to chronic infectious/inflammatory process, Ill-defined groundglass opacities predominantly affecting DERIC and lingula, Dependent atelectasis  - Small left-sided pleural effusion  -Atrophic thyroid with ill-defined subcentimeter nodularity  - Cholelithiasis  - Atrophic appearance of native kidneys, right lower quadrant renal transplant with mild hydronephrosis  - 1.6 cm hyperattenuating lesion of left kidney  - Ill-defined soft tissue thickening about cecum, likely corresponding to known cecal mass  - Associated thickening of peritoneal planes in the region  - Enlarged 2.1 x 1.7 cm mesenteric node adjacent to cecum  - Borderline enlarged 0.9 cm perirectal node  IMPRESSION:   1. Cecal mass with enlarged nodes of the right lower quadrant  including a 2.1 x 1.7 cm lymph node, which are suspicious for  metastatic disease. Indeterminate thickening and nodularity of the  peritoneal surfaces near the cecal mass could be due to fluid or  mesenteric spread of disease.  3. Indeterminate 1.6 cm lesion of the left native kidney. Consider  dedicated renal MRI with contrast for further characterization.  4. Right lower quadrant renal transplant with mild hydronephrosis.  5. Ill-defined groundglass  opacities of the left upper lobe and  lingula suspicious for an infectious/inflammatory etiology. Additional  bronchiectatic and fibroatelectatic changes of the right middle lobe  and lingula suggesting a chronic inflammatory process.  6. Small left-sided pleural effusion.  7. Dilated main pulmonary artery as can be seen with pulmonary  arterial hypertension.  8. Cholelithiasis without findings to suggest acute cholecystitis.    -12/21/23 CEA 31.3      Pt denies change in stool caliber, diarrhea, abdominal pain, abdominal distention, nausea, vomiting, no weight loss. Stool is dark (on iron), reported to be black, no BRBPR, no clots. Pt reports weight loss 10/23-11/23 10-15 lbs, then gain 10 over past few months    Pt reports right leg swelling 12/23 onwards with some improvement and palpated knot in left leg. B/l LE doppler 12/8/23 negative for DVT    Regarding ECOG:  Significant decline over the past 6 months, currently using wheelchair and supplemental oxygen  Working with PT 2x/week  Uses cane and walker prn recently, a few months ago was more consistent  Wife helps with most ADLs (cooking, cleaning, groceries, driving). Is able to go to bathroom, shower and dress himself.  Spends >50% of day in recliner    Family history:  Brother- colorectal cancer, age 61, s/p surgery, receiving chemotherapy      Regarding kidney transplant  - right kidney transplant 2014  - on tacrolimus and prednisone 10 mg daily (off cellcept since 12/23)  - 12/23 Cr 1.6, GFR 45        REVIEW OF SYSTEMS:   A 14 point ROS was reviewed with pertinent positives and negatives in the HPI.        HOME MEDICATIONS:  Current Outpatient Medications   Medication Sig Dispense Refill    albuterol (PROAIR HFA/PROVENTIL HFA/VENTOLIN HFA) 108 (90 Base) MCG/ACT inhaler INHALE 2 PUFFS BY INHALATION EVERY 6 HOURS AS NEEDED FOR SHORTNESS OF BREATH, FOR SECRETIONS      amLODIPine (NORVASC) 10 MG tablet Take 10 mg by mouth daily      carvedilol (COREG) 12.5 MG  tablet Take 12.5 mg by mouth 2 times daily      insulin aspart U-10, diluted conc 10 units/mL, (NOVOLOG) 10 unit/ml injection Inject Subcutaneous 3 times daily (before meals) 10-16 units      INSULIN GLARGINE SC Inject 460 Units Subcutaneous At Bedtime 7/11 am      ipratropium - albuterol 0.5 mg/2.5 mg/3 mL (DUONEB) 0.5-2.5 (3) MG/3ML neb solution INHALE 3ML IN NEBULIZER BY INHALATION FOUR TIMES A DAY AS NEEDED FOR SHORTNESS OF BREATH      loratadine (CLARITIN) 10 MG tablet 10 mg      Magnesium Oxide 420 MG TABS Take 1 tablet by mouth 2 times daily      OMEPRAZOLE PO Take 1 tablet by mouth daily      predniSONE (DELTASONE) 10 MG tablet Take 1 tablet by mouth daily      tacrolimus (PROGRAF-GENERIC EQUIVALENT) 5 MG capsule Take 5 mg by mouth daily      tamsulosin (FLOMAX) 0.4 MG capsule Take 0.4 mg by mouth daily      tiotropium-olodaterol 2.5-2.5 MCG/ACT AERS INHALE 2 PUFFS BY INHALATION EVERY DAY FOR COPD      VITAMIN D, CHOLECALCIFEROL, PO Take 1 tablet by mouth once a week      ASPIRIN PO Take 81 mg by mouth      fluconazole (DIFLUCAN) 200 MG tablet 200 mg      ketorolac (ACULAR) 0.5 % ophthalmic solution Place 1 drop into the right eye 3 times daily 1 Bottle 1    mycophenolate, IDS 3865, (CELLCEPT) 250 MG capsule Take 2 tablets by mouth 2 times daily (Patient not taking: Reported on 1/18/2024)      prednisoLONE 5 MG TABS Take 1 tablet by mouth daily (Patient not taking: Reported on 1/18/2024)      prednisoLONE acetate (PRED FORTE) 1 % ophthalmic suspension Place 1 drop into the right eye 3 times daily 1 Bottle 1    ranitidine (ZANTAC) 150 MG capsule Take 1 tablet by mouth daily           ALLERGIES:  No Known Allergies      PAST MEDICAL HISTORY:  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    Sleep apnea     has equipment  not always uses    Type 2  "diabetes mellitus without complications (H)          PAST SURGICAL HISTORY:  Past Surgical History:   Procedure Laterality Date    EYE SURGERY  7/12/2016    CE IOL PPV RE    PANRETINAL PHOTOCOAGULATION (PRP) OD (RIGHT EYE)      last 9/18/13    PANRETINAL PHOTOCOAGULATION (PRP) OS (LEFT EYE)  4/15/14    [facility]    PHACOEMULSIFICATION CLEAR CORNEA WITH STANDARD IOL, VITRECTOMY PARSPLANA 25 GAGUE, COMBINED Right 7/12/2016    Procedure: COMBINED PHACOEMULSIFICATION CLEAR CORNEA WITH STANDARD INTRAOCULAR LENS IMPLANT, VITRECTOMY PARSPLANA 25 GAUGE;  Surgeon: Mireille Don MD;  Location: [facility]         SOCIAL HISTORY:  Social History     Socioeconomic History    Marital status:      Spouse name: Not on file    Number of children: Not on file    Years of education: Not on file    Highest education level: Not on file   Occupational History    Not on file   Tobacco Use    Smoking status: Former    Smokeless tobacco: Never   Substance and Sexual Activity    Alcohol use: Not on file    Drug use: Not on file    Sexual activity: Not on file   Other Topics Concern    Not on file   Social History Narrative    Not on file     Social Determinants of Health     Financial Resource Strain: Not on file   Food Insecurity: Not on file   Transportation Needs: Not on file   Physical Activity: Not on file   Stress: Not on file   Social Connections: Not on file   Interpersonal Safety: Not on file   Housing Stability: Not on file         FAMILY HISTORY:  Family History   Problem Relation Age of Onset    Glaucoma No family hx of     Macular Degeneration No family hx of          PHYSICAL EXAM:  Vital signs:  BP (!) 153/81   Pulse 75   Temp 98.4  F (36.9  C)   Resp 18   Ht 1.778 m (5' 10\")   Wt 97.2 kg (214 lb 4.8 oz)   SpO2 98%   BMI 30.75 kg/m       ECOG: 3  GENERAL/CONSTITUTIONAL: No acute distress. In wheelchair  EYES: Pupils are equal and round. Extraocular movements intact without nystagmus.  No scleral " "icterus.  RESPIRATORY: Equal chest rise.   MUSCULOSKELETAL: Warm and well-perfused, no cyanosis, clubbing. Right LE 2+ pitting edema to knee, left LE 1+ pitting edema to mid tibia  NEUROLOGIC: Cranial nerves are grossly intact. Alert, oriented to person, place and time, answers questions appropriately.  INTEGUMENTARY: No rashes or jaundice. Left tibia hyperpigmentation           LABS:  CBC RESULTS: No results for input(s): \"WBC\", \"RBC\", \"HGB\", \"HCT\", \"MCV\", \"MCH\", \"MCHC\", \"RDW\", \"PLT\" in the last 36682 hours.    No results for input(s): \"NA\", \"POTASSIUM\", \"CHLORIDE\", \"CO2\", \"ANIONGAP\", \"GLC\", \"BUN\", \"CR\", \"VIVI\" in the last 22447 hours.      PATHOLOGY:      IMAGING:      ASSESSMENT/PLAN:  Berny Estrada is a 73 year old male with:      # moderately differentiated cecum adenocarcinoma, MSI-high (loss of MLH1 and PMS2, MLH1 promoter hypermethylated), BRAF V600 mutation positive  -12/14/23 diagnostic colonoscopy: Fungating partially obstructing, partially circumferential (involving two thirds of the lumen circumference) large mass found in the cecum, including IC valve, with oozing, PATHOLOGY: COLON, CECUM MASS, BIOPSY: Adenocarcinoma, moderately differentiated, Loss of expression of MLH1 and PMS2; intact expression of MSH2 and MSH6, MLH1 promoter methylation: POSITIVE ; BRAF V600 mutation positive  -12/21/23 CT CAP without contrast: Cecal mass with enlarged nodes of the right lower quadrant  including a 2.1 x 1.7 cm lymph node, which are suspicious for metastatic disease. Indeterminate thickening and nodularity of the peritoneal surfaces near the cecal mass could be due to fluid or  mesenteric spread of disease. Indeterminate 1.6 cm lesion of the left native kidney. Small left-sided pleural effusion.  -12/7/23 CEA 10,  12/21/23 CEA 31.3    PLAN:  - ECOG 3 currently, ECOG 2 at baseline  - I am concerned the patient has metastatic disease with peritoneal carcinomatosis. Overall, right sided tumors w/BRAF mutation tend " to have poorer prognosis. 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 If pt was to receive 5FU based regimen, it could include ie. 5FU, LV, +/- avastin but would have to monitor renal function closely and for QTC prolongation w/concurrent tacrolimus tx for renal transplant  - would need to discuss the above regimen with pts nephrologist prior to proceeding with any possible tx  - though immunotherapy can play a role in MSI-high colon cancer treatment upfront, pt is on immunosuppressive therapy for renal transplant   - multidisciplinary colorectal tumor board 1/29/24  - MRI pending 1/31/24, will try to move this up to before tumor board  - check baseline CBC, CMP  - check colorectal NGS  - monitor for s/s of obstruction      #macrocytic anemia  - per Care Everywhere labs:  12/23 labs  Hgb 7.9-8.9, MCV   Ferritin 1827, iron sat 60, TIBC 111, iron 67  Haptoglobin 177, absolute retic 0.1011    PLAN:  - check the following labs:  CBC  Ferritin, iron studies  B12, RBC folate  TSH  CMP, retic    #renal transplant  - [facility]  -  S/p cadaveric transplant in [facility] in 2014   - on tacrolimus and prednisone 10 mg daily (off cellcept since 12/23)  - 12/23 Cr 1.6, GFR 45    #type 2 diabetes  - avg BS 230s    # RLE swelling  - B/l LE doppler 12/8/23 negative for DVT  - monitor for s/s DVT    RTC 3 weeks for follow up with mago Blunt DO  Hematology/Oncology  [facility]    Future Appointments   Date Time Provider Department Center   1/18/2024 10:45 AM Stacey Blunt DO [facility] [place]   1/29/2024 12:15 PM COLORECTAL TUMOR CONFERENCE [facility]   1/31/2024  7:45 AM [facility]  [place]   1/31/2024  9:20 AM LAB FIRST FLOOR [facility] [place]

## 2024-01-19 ENCOUNTER — ANCILLARY PROCEDURE (OUTPATIENT)
Dept: MRI IMAGING | Facility: CLINIC | Age: 74
End: 2024-01-19
Attending: COLON & RECTAL SURGERY
Payer: COMMERCIAL

## 2024-01-19 DIAGNOSIS — C18.0 MALIGNANT NEOPLASM OF CECUM (H): ICD-10-CM

## 2024-01-19 LAB
CANCER AG125 SERPL-ACNC: 9 U/ML
CEA SERPL-MCNC: 48.5 NG/ML
PREALB SERPL-MCNC: 23.4 MG/DL (ref 20–40)
TRANSFERRIN SERPL-MCNC: 151 MG/DL (ref 200–360)
VIT B12 SERPL-MCNC: 1082 PG/ML (ref 232–1245)

## 2024-01-19 PROCEDURE — 72197 MRI PELVIS W/O & W/DYE: CPT | Performed by: RADIOLOGY

## 2024-01-19 PROCEDURE — 74183 MRI ABD W/O CNTR FLWD CNTR: CPT | Performed by: RADIOLOGY

## 2024-01-19 PROCEDURE — A9585 GADOBUTROL INJECTION: HCPCS | Performed by: RADIOLOGY

## 2024-01-19 RX ORDER — GADOBUTROL 604.72 MG/ML
10 INJECTION INTRAVENOUS ONCE
Status: COMPLETED | OUTPATIENT
Start: 2024-01-19 | End: 2024-01-19

## 2024-01-19 RX ADMIN — GADOBUTROL 10 ML: 604.72 INJECTION INTRAVENOUS at 10:13

## 2024-01-20 LAB
CANCER AG19-9 SERPL IA-ACNC: <2 U/ML
FOLATE RBC-MCNC: NORMAL NG/ML

## 2024-01-22 DIAGNOSIS — C18.0 MALIGNANT NEOPLASM OF CECUM (H): Primary | ICD-10-CM

## 2024-02-09 ENCOUNTER — ONCOLOGY VISIT (OUTPATIENT)
Dept: ONCOLOGY | Facility: CLINIC | Age: 74
End: 2024-02-09
Attending: INTERNAL MEDICINE
Payer: COMMERCIAL

## 2024-02-09 VITALS
SYSTOLIC BLOOD PRESSURE: 160 MMHG | OXYGEN SATURATION: 99 % | BODY MASS INDEX: 31.92 KG/M2 | HEIGHT: 70 IN | HEART RATE: 70 BPM | DIASTOLIC BLOOD PRESSURE: 77 MMHG | WEIGHT: 223 LBS

## 2024-02-09 DIAGNOSIS — D53.9 MACROCYTIC ANEMIA: ICD-10-CM

## 2024-02-09 DIAGNOSIS — C18.0 MALIGNANT NEOPLASM OF CECUM (H): Primary | ICD-10-CM

## 2024-02-09 DIAGNOSIS — Z94.0 KIDNEY REPLACED BY TRANSPLANT: ICD-10-CM

## 2024-02-09 PROCEDURE — G0463 HOSPITAL OUTPT CLINIC VISIT: HCPCS | Performed by: INTERNAL MEDICINE

## 2024-02-09 PROCEDURE — 99214 OFFICE O/P EST MOD 30 MIN: CPT | Performed by: INTERNAL MEDICINE

## 2024-02-09 RX ORDER — METHYLPREDNISOLONE SODIUM SUCCINATE 125 MG/2ML
125 INJECTION, POWDER, LYOPHILIZED, FOR SOLUTION INTRAMUSCULAR; INTRAVENOUS
Status: CANCELLED
Start: 2024-03-21

## 2024-02-09 RX ORDER — EPINEPHRINE 1 MG/ML
0.3 INJECTION, SOLUTION INTRAMUSCULAR; SUBCUTANEOUS EVERY 5 MIN PRN
Status: CANCELLED | OUTPATIENT
Start: 2024-03-21

## 2024-02-09 RX ORDER — HEPARIN SODIUM,PORCINE 10 UNIT/ML
5-20 VIAL (ML) INTRAVENOUS DAILY PRN
Status: CANCELLED | OUTPATIENT
Start: 2024-04-02

## 2024-02-09 RX ORDER — MEPERIDINE HYDROCHLORIDE 25 MG/ML
25 INJECTION INTRAMUSCULAR; INTRAVENOUS; SUBCUTANEOUS EVERY 30 MIN PRN
Status: CANCELLED | OUTPATIENT
Start: 2024-03-21

## 2024-02-09 RX ORDER — HEPARIN SODIUM (PORCINE) LOCK FLUSH IV SOLN 100 UNIT/ML 100 UNIT/ML
5 SOLUTION INTRAVENOUS
Status: CANCELLED | OUTPATIENT
Start: 2024-04-02

## 2024-02-09 RX ORDER — ONDANSETRON 2 MG/ML
8 INJECTION INTRAMUSCULAR; INTRAVENOUS ONCE
Status: CANCELLED | OUTPATIENT
Start: 2024-03-21

## 2024-02-09 RX ORDER — HEPARIN SODIUM,PORCINE 10 UNIT/ML
5-20 VIAL (ML) INTRAVENOUS DAILY PRN
Status: CANCELLED | OUTPATIENT
Start: 2024-03-21

## 2024-02-09 RX ORDER — DIPHENHYDRAMINE HYDROCHLORIDE 50 MG/ML
50 INJECTION INTRAMUSCULAR; INTRAVENOUS
Status: CANCELLED
Start: 2024-03-21

## 2024-02-09 RX ORDER — ALBUTEROL SULFATE 0.83 MG/ML
2.5 SOLUTION RESPIRATORY (INHALATION)
Status: CANCELLED | OUTPATIENT
Start: 2024-03-21

## 2024-02-09 RX ORDER — HEPARIN SODIUM (PORCINE) LOCK FLUSH IV SOLN 100 UNIT/ML 100 UNIT/ML
5 SOLUTION INTRAVENOUS
Status: CANCELLED | OUTPATIENT
Start: 2024-03-21

## 2024-02-09 RX ORDER — ALBUTEROL SULFATE 90 UG/1
1-2 AEROSOL, METERED RESPIRATORY (INHALATION)
Status: CANCELLED
Start: 2024-03-21

## 2024-02-09 ASSESSMENT — PAIN SCALES - GENERAL: PAINLEVEL: NO PAIN (0)

## 2024-02-09 NOTE — NURSING NOTE
"Oncology Rooming Note    February 9, 2024 1:51 PM   Berny Estrada is a 73 year old male who presents for:    Chief Complaint   Patient presents with    Oncology Clinic Visit     Follow Up     Initial Vitals: BP (!) 160/77   Pulse 70   Ht 1.778 m (5' 10\")   Wt 101.2 kg (223 lb)   SpO2 99%   BMI 32.00 kg/m   Estimated body mass index is 32 kg/m  as calculated from the following:    Height as of this encounter: 1.778 m (5' 10\").    Weight as of this encounter: 101.2 kg (223 lb). Body surface area is 2.24 meters squared.  No Pain (0) Comment: Data Unavailable   No LMP for male patient.  Allergies reviewed: Yes  Medications reviewed: Yes    Medications: Medication refills not needed today.  Pharmacy name entered into EPIC: [facility] - [place], [place] - [facility]    Frailty Screening:   Is the patient here for a new oncology consult visit in cancer care? 2. No      Clinical concerns: No Concerns        Mara Ferris MA            "

## 2024-02-09 NOTE — PROGRESS NOTES
[facility]    Hematology/Oncology Established Patient Follow-Up Note      Today's Date: 2/9/2024    Reason for follow-up: cecal adenocarcinoma     HISTORY OF PRESENT ILLNESS: Berny Estrada is a 73 year old male who presents for follow-up    Patient has medical history including right renal transplant (2014), hypertension, type 2 diabetes with proliferative diabetic retinopathy and long-term insulin use, CKD stage III, CAD, COPD, GERD, colon polyps (tubular adenoma), esophageal candidiasis, cholelithiasis, sleep apnea, hx of tobacco use (quit 50 years ago)    -12/1/23- 12/9/23 hospitalized 2/2 anemia, hgb 7.9 w/melena x few months (was on iron)  - 12/6/23 CEA 11.69, 12/7/23 CEA 10  - 12/14/2023 EGD: Patchy white plaques in proximal esophagus and midesophagus suspicious for candidiasis, otherwise normal  -12/14/2023 [facility]:  - Fungating partially obstructing, partially circumferential (involving two thirds of the lumen circumference) large mass found in the cecum, including IC valve, with oozing  - 4 sessile polyps in ascending colon, 3-5 mm in size  - 2 mm sessile polyp in transverse colon  - 2 sessile polyps in the ascending colon, 4-5 mm in size  - 6 sessile polyps in the rectosigmoid colon, 3-8 mm in size  - Medium sized external hemorrhoids  PATHOLOGY:  1.  MID ESOPHAGUS, BIOPSY:  - Squamous mucosa with superficial colonization by fungal organisms positive for PAS stain, morphologically compatible with Candida species    2.  COLON, CECUM MASS, BIOPSY:  - Adenocarcinoma, moderately differentiated  - Loss of expression of MLH1 and PMS2; intact expression of MSH2 and MSH6   - MLH1 promoter methylation: POSITIVE   - BRAF V600 mutation positive     3.  ASCENDING COLON, POLYPECTOMY:  - Sessile serrated adenoma with focal high-grade dysplasia  - Separate minute fragment of adenocarcinoma     4.  TRANSVERSE COLON, POLYPECTOMY:  - Tubular  adenoma; negative for high-grade dysplasia     5.  DESCENDING COLON, POLYPECTOMY:  - Tubular adenoma; negative for high-grade dysplasia     6.  RECTOSIGMOID COLON, POLYPECTOMY:  - Sessile serrated adenoma with focal cytologic dysplasia  - No evidence of high-grade dysplasia or invasive carcinoma    -12/21/23 CT CAP without contrast:  -Cardiomegaly, coronary artery stenting calcifications  - Dilated main pulmonary artery, 3.8 cm  - Calcified hilar lymphadenopathy  - RML and lingular bronchiectatic changes with associated fibroatelectasis, may be secondary to chronic infectious/inflammatory process, Ill-defined groundglass opacities predominantly affecting DERIC and lingula, Dependent atelectasis  - Small left-sided pleural effusion  -Atrophic thyroid with ill-defined subcentimeter nodularity  - Cholelithiasis  - Atrophic appearance of native kidneys, right lower quadrant renal transplant with mild hydronephrosis  - 1.6 cm hyperattenuating lesion of left kidney  - Ill-defined soft tissue thickening about cecum, likely corresponding to known cecal mass  - Associated thickening of peritoneal planes in the region  - Enlarged 2.1 x 1.7 cm mesenteric node adjacent to cecum  - Borderline enlarged 0.9 cm perirectal node  IMPRESSION:   1. Cecal mass with enlarged nodes of the right lower quadrant  including a 2.1 x 1.7 cm lymph node, which are suspicious for  metastatic disease. Indeterminate thickening and nodularity of the  peritoneal surfaces near the cecal mass could be due to fluid or  mesenteric spread of disease.  3. Indeterminate 1.6 cm lesion of the left native kidney. Consider  dedicated renal MRI with contrast for further characterization.  4. Right lower quadrant renal transplant with mild hydronephrosis.  5. Ill-defined groundglass opacities of the left upper lobe and  lingula suspicious for an infectious/inflammatory etiology. Additional  bronchiectatic and fibroatelectatic changes of the right middle lobe  and  lingula suggesting a chronic inflammatory process.  6. Small left-sided pleural effusion.  7. Dilated main pulmonary artery as can be seen with pulmonary  arterial hypertension.  8. Cholelithiasis without findings to suggest acute cholecystitis.    -12/21/23 CEA 31.3      Pt denies change in stool caliber, diarrhea, abdominal pain, abdominal distention, nausea, vomiting, no weight loss. Stool is dark (on iron), reported to be black, no BRBPR, no clots. Pt reports weight loss 10/23-11/23 10-15 lbs, then gain 10 over past few months    Pt reports right leg swelling 12/23 onwards with some improvement and palpated knot in left leg. B/l LE doppler 12/8/23 negative for DVT    Regarding ECOG:  Significant decline over the past 6 months, currently using wheelchair and supplemental oxygen  Working with PT 2x/week  Uses cane and walker prn recently, a few months ago was more consistent  Wife helps with most ADLs (cooking, cleaning, groceries, driving). Is able to go to bathroom, shower and dress himself.  Spends about 50% of day in recliner per pt, less per wife  PT 2x/week     Family history:  Brother- colorectal cancer, age 61, s/p surgery, receiving chemotherapy    Regarding kidney transplant  - right kidney transplant 2014  - on tacrolimus and prednisone 10 mg daily (off cellcept since 12/23)  - 12/23 Cr 1.6, GFR 45    INTERIM HISTORY:  - 1/19/24 MRI peritoneum w/contrast:  -  No appreciable focal hepatic lesion, though evaluation is limited by marked respiratory motion artifact..   - Subcentimeter T2 hyperintense cystic foci  in the pancreatic head and body/tail. 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 Recommend attention on  follow-up.  - Hypoenhancing cecal mass measuring approximately 4.0 x 5.8 x 5.7 cm just below the ileocecal valve.  - Enlarged right lower quadrant/pericecal mesenteric lymph  nodes measuring up to 2.0 cm short axis  - Small left pleural effusion with adjacent  compressive  atelectasis. Cardiomegaly  -  Trace ascites, greatest in the pericecal region without  appreciable suspicious peritoneal nodularity    REVIEW OF SYSTEMS:   A 14 point ROS was reviewed with pertinent positives and negatives in the HPI.        HOME MEDICATIONS:  Current Outpatient Medications   Medication Sig Dispense Refill    albuterol (PROAIR HFA/PROVENTIL HFA/VENTOLIN HFA) 108 (90 Base) MCG/ACT inhaler INHALE 2 PUFFS BY INHALATION EVERY 6 HOURS AS NEEDED FOR SHORTNESS OF BREATH, FOR SECRETIONS      amLODIPine (NORVASC) 10 MG tablet Take 10 mg by mouth daily      carvedilol (COREG) 12.5 MG tablet Take 12.5 mg by mouth 2 times daily      insulin aspart U-10, diluted conc 10 units/mL, (NOVOLOG) 10 unit/ml injection Inject Subcutaneous 3 times daily (before meals) 10-16 units      INSULIN GLARGINE SC Inject 460 Units Subcutaneous At Bedtime 7/11 am      ipratropium - albuterol 0.5 mg/2.5 mg/3 mL (DUONEB) 0.5-2.5 (3) MG/3ML neb solution INHALE 3ML IN NEBULIZER BY INHALATION FOUR TIMES A DAY AS NEEDED FOR SHORTNESS OF BREATH      loratadine (CLARITIN) 10 MG tablet 10 mg      Magnesium Oxide 420 MG TABS Take 1 tablet by mouth 2 times daily      OMEPRAZOLE PO Take 1 tablet by mouth daily      predniSONE (DELTASONE) 10 MG tablet Take 1 tablet by mouth daily      tacrolimus (PROGRAF-GENERIC EQUIVALENT) 5 MG capsule Take 5 mg by mouth daily      tamsulosin (FLOMAX) 0.4 MG capsule Take 0.4 mg by mouth daily      tiotropium-olodaterol 2.5-2.5 MCG/ACT AERS INHALE 2 PUFFS BY INHALATION EVERY DAY FOR COPD      VITAMIN D, CHOLECALCIFEROL, PO Take 1 tablet by mouth once a week      ASPIRIN PO Take 81 mg by mouth      fluconazole (DIFLUCAN) 200 MG tablet 200 mg      ketorolac (ACULAR) 0.5 % ophthalmic solution Place 1 drop into the right eye 3 times daily 1 Bottle 1    mycophenolate, IDS 3865, (CELLCEPT) 250 MG capsule Take 2 tablets by mouth 2 times daily (Patient not taking: Reported on 1/18/2024)      prednisoLONE 5  MG TABS Take 1 tablet by mouth daily (Patient not taking: Reported on 1/18/2024)      prednisoLONE acetate (PRED FORTE) 1 % ophthalmic suspension Place 1 drop into the right eye 3 times daily 1 Bottle 1    ranitidine (ZANTAC) 150 MG capsule Take 1 tablet by mouth daily           ALLERGIES:  No Known Allergies      PAST MEDICAL HISTORY:  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    Sleep apnea     has equipment  not always uses    Type 2 diabetes mellitus without complications (H)          PAST SURGICAL HISTORY:  Past Surgical History:   Procedure Laterality Date    EYE SURGERY  7/12/2016    CE IOL PPV RE    PANRETINAL PHOTOCOAGULATION (PRP) OD (RIGHT EYE)      last 9/18/13    PANRETINAL PHOTOCOAGULATION (PRP) OS (LEFT EYE)  4/15/14    [facility]    PHACOEMULSIFICATION CLEAR CORNEA WITH STANDARD IOL, VITRECTOMY PARSPLANA 25 GAGUE, COMBINED Right 7/12/2016    Procedure: COMBINED PHACOEMULSIFICATION CLEAR CORNEA WITH STANDARD INTRAOCULAR LENS IMPLANT, VITRECTOMY PARSPLANA 25 GAUGE;  Surgeon: Mireille Don MD;  Location: [facility]         SOCIAL HISTORY:  Social History     Socioeconomic History    Marital status:      Spouse name: Not on file    Number of children: Not on file    Years of education: Not on file    Highest education level: Not on file   Occupational History    Not on file   Tobacco Use    Smoking status: Former    Smokeless tobacco: Never   Substance and Sexual Activity    Alcohol use: Not on file    Drug use: Not on file    Sexual activity: Not on file   Other Topics Concern    Not on file   Social History Narrative    Not on file     Social Determinants of Health     Financial Resource Strain: Not on file   Food Insecurity: Not on file   Transportation Needs: Not on file   Physical Activity: Not on file   Stress: Not on  "file   Social Connections: Not on file   Interpersonal Safety: Not on file   Housing Stability: Not on file         FAMILY HISTORY:  Family History   Problem Relation Age of Onset    Glaucoma No family hx of     Macular Degeneration No family hx of          PHYSICAL EXAM:  Vital signs:  BP (!) 160/77   Pulse 70   Ht 1.778 m (5' 10\")   Wt 101.2 kg (223 lb)   SpO2 99%   BMI 32.00 kg/m       ECO  GENERAL/CONSTITUTIONAL: No acute distress. In wheelchair  EYES: Pupils are equal and round. Extraocular movements intact without nystagmus.  No scleral icterus.  RESPIRATORY: Equal chest rise.   MUSCULOSKELETAL: Warm and well-perfused, no cyanosis, clubbing. Right LE 2+ pitting edema to knee, left LE 1+ pitting edema to mid tibia  NEUROLOGIC: Cranial nerves are grossly intact. Alert, oriented to person, place and time, answers questions appropriately.  INTEGUMENTARY: No rashes or jaundice. Left tibia hyperpigmentation           LABS:   Latest Reference Range & Units 24 12:31   Sodium 135 - 145 mmol/L 138   Potassium 3.4 - 5.3 mmol/L 5.5 (H)   Chloride 98 - 107 mmol/L 103   Carbon Dioxide (CO2) 22 - 29 mmol/L 26   Urea Nitrogen 8.0 - 23.0 mg/dL 28.8 (H)   Creatinine 0.67 - 1.17 mg/dL 1.72 (H)   GFR Estimate >60 mL/min/1.73m2 41 (L)   Calcium 8.8 - 10.2 mg/dL 9.6   Anion Gap 7 - 15 mmol/L 9   Albumin 3.5 - 5.2 g/dL 3.8   Protein Total 6.4 - 8.3 g/dL 7.1   Alkaline Phosphatase 40 - 150 U/L 77   ALT 0 - 70 U/L 12   AST 0 - 45 U/L 18   Bilirubin Total <=1.2 mg/dL 0.3    <=38 U/mL 9   Cancer Antigen 19-9 <=35 U/mL <2   CEA ng/mL 48.5   Ferritin 31 - 409 ng/mL 451 (H)   RBC FOLATE  Rpt   Glucose 70 - 99 mg/dL 186 (H)   Iron 61 - 157 ug/dL 69   Iron Binding Capacity 240 - 430 ug/dL 179 (L)   Iron Sat Index 15 - 46 % 39   Transferrin 200.0 - 360.0 mg/dL 151.0 (L)   TSH 0.30 - 4.20 uIU/mL 2.77   Vitamin B12 232 - 1,245 pg/mL 1,082   WBC 4.0 - 11.0 10e3/uL 6.4   Hemoglobin 13.3 - 17.7 g/dL 11.2 (L)   Hematocrit 40.0 " - 53.0 % 35.7 (L)   Platelet Count 150 - 450 10e3/uL 172   RBC Count 4.40 - 5.90 10e6/uL 3.63 (L)   MCV 78 - 100 fL 98   MCH 26.5 - 33.0 pg 30.9   MCHC 31.5 - 36.5 g/dL 31.4 (L)   RDW 10.0 - 15.0 % 15.9 (H)   % Neutrophils % 71   % Lymphocytes % 19   % Monocytes % 8   % Eosinophils % 1   % Basophils % 0   Absolute Basophils 0.0 - 0.2 10e3/uL 0.0   Absolute Eosinophils 0.0 - 0.7 10e3/uL 0.1   Absolute Immature Granulocytes <=0.4 10e3/uL 0.1   Absolute Lymphocytes 0.8 - 5.3 10e3/uL 1.2   Absolute Monocytes 0.0 - 1.3 10e3/uL 0.5   % Immature Granulocytes % 1   Absolute Neutrophils 1.6 - 8.3 10e3/uL 4.5   Absolute NRBCs 10e3/uL 0.0   NRBCs per 100 WBC <1 /100 0   % Retic 0.5 - 2.0 % 3.1 (H)   Absolute Retic 0.025 - 0.095 10e6/uL 0.111 (H)   Prealbumin 20.0 - 40.0 mg/dL 23.4   (H): Data is abnormally high  (L): Data is abnormally low  Rpt: View report in Results Review for more information      PATHOLOGY:      IMAGING:  Narrative & Impression   Exam: MR PERITONEUM WO & W CONTRAST, 1/19/2024 3:17 PM     Indication: Malignant neoplasm of cecum (H)     Comparison: 12/21/2023     Technique: Images were acquired with and without intravenous contrast  through the abdomen. The following MR images were acquired: TrueFISP,  multiplanar T2 weighted, axial T1 in/out of phase, axial fat-saturated  T1, diffusion-weighted. Multiplanar T1-weighted images with fat  saturation were obtained before contrast administration and at  multiple time points following the administration of intravenous  contrast. Contrast dose: 10ml Gadavist     FINDINGS:     Liver: No hepatic side or iron deposition. No appreciable focal  hepatic lesion, though evaluation is limited by marked respiratory  motion artifact..      Gallbladder/biliary tree: Cholelithiasis. No gallbladder wall  thickening or pericholecystic fluid. No intra or extrahepatic biliary  dilation.     Spleen: Normal.     Kidneys: Atrophic native kidneys. T1 hyperintense,  nonenhancing  hemorrhagic/proteinaceous cyst in the posterior mid pole of the left  kidney. T2 hyperintense, nonenhancing right renal cortical cysts.  Right lower quadrant renal transplant with stable moderate  hydronephrosis upstream of the ureterovesical junction. No suspicious  renal transplant lesion.     Adrenal glands: Normal.     Pancreas: Moderately atrophic pancreas. No main pancreatic ductal  dilation or suspicious mass. Subcentimeter T2 hyperintense cystic foci  in the pancreatic head and body/tail.     Bowel: No dilated small or large bowel. Hypoenhancing cecal mass  measuring approximately 4.0 x 5.8 x 5.7 cm just below the ileocecal  valve.     Lymph nodes: Enlarged right lower quadrant/pericecal mesenteric lymph  nodes measuring up to 2.0 cm short axis (series 23 image 79).     Blood vessels: The major abdominal and pelvic vasculature is patent.     Lung bases: Small left pleural effusion with adjacent compressive  atelectasis. Cardiomegaly.     Bones and soft tissues: No aggressive osseous lesion.     Mesentery and abdominal wall: Subcutaneous edema in the left greater  than right abdominal wall. Right lower quadrant abdominal wall  incisional changes.     Ascites: Trace ascites, greatest in the pericecal region without  appreciable suspicious peritoneal nodularity.                                                                      IMPRESSION:   1. Cecal mass with associated right lower quadrant mesenteric  lymphadenopathy.  2. No appreciable suspicious hepatic lesion, though evaluation is  limited by marked respiratory motion artifact.  3. Trace ascites, greatest in the pericecal region without convincing  associated suspicious peritoneal nodularity.  4. Right lower quadrant renal transplant with stable moderate  hydronephrosis upstream of the ureteropelvic junction.  5. Cholelithiasis.  6. 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 Recommend  attention on  follow-up.  7. Hemorrhagic/proteinaceous cyst in the atrophic native left kidney.  8. Small left pleural effusion.     MICHAEL NICOLE, DO           ASSESSMENT/PLAN:  Berny Estrada is a 73 year old male with:      # moderately differentiated cecum adenocarcinoma, MSI-high (loss of MLH1 and PMS2, MLH1 promoter hypermethylated), BRAF V600 mutation positive  -12/14/23 diagnostic colonoscopy: Fungating partially obstructing, partially circumferential (involving two thirds of the lumen circumference) large mass found in the cecum, including IC valve, with oozing, PATHOLOGY: COLON, CECUM MASS, BIOPSY: Adenocarcinoma, moderately differentiated, Loss of expression of MLH1 and PMS2; intact expression of MSH2 and MSH6, MLH1 promoter methylation: POSITIVE ; BRAF V600 mutation positive (not Prasad syndrome)  - CRC NGS: Detected Alterations of Known or Potential Pathogenicity: BRAF V600E, TMB Score: 73.131 mut/Mb  -12/21/23 CT CAP without contrast: Cecal mass with enlarged nodes of the right lower quadrant including a 2.1 x 1.7 cm lymph node, which are suspicious for metastatic disease. Indeterminate thickening and nodularity of the peritoneal surfaces near the cecal mass could be due to fluid or mesenteric spread of disease. Indeterminate 1.6 cm lesion of the left native kidney. Small left-sided pleural effusion.  -12/7/23 CEA 10,  12/21/23 CEA 31.3, 1/24 CEA 48.5    -1/24 MR peritoneum: no peritoneal disease, Hypoenhancing cecal mass measuring approximately 4.0 x 5.8 x 5.7 cm just below the ileocecal Valve, Enlarged RLQ/pericecal mesenteric lymph nodes measuring up to 2.0 cm short axis     PLAN:  - pt has at least stage 3 disease with enlarged pericecal mesenteric LN  - d/w Dr. Reid, will attempt to give chemotherapy up front for approximately 3 months and then restage to see if he is a surgical candidate  - ECOG 2 at baseline at best   - for right sided tumors w/BRAF mutation, FOLFIRINOX or FOLFIRI is used  w/avastin, however given pts co-morbidities, I do not think the patient could tolerate this regimen. Will avoid immunotherapy given pt is on immunosuppressive medications for renal transplant   - we will start with infusional 5FU and LV and see how pt tolerates this  - monitor renal function, monitor cytopenias and QTc prolongation while on tacrolimus and fluconazole for renal transplant  - I will reach out to the pts nephrologist Dr. Goodson regarding the above  - discussed the regimen detailed below, schedule, AE, outcomes and pt consents to start tx as detailed below:    REGIMEN:  [id number]  q14 days, up to 24 weeks of perioperative chemo total   LV 350mg/m2 day 1  5FU 1200mg/m2 continuous infusion day 1-2 (total 2,400mg/m2 IV over 46-48 hours)  Emetic risk: moderate  Febrile neutropenia risk: low     - port placement  - chemo teaching      #macrocytic anemia  - per Care Everywhere labs:  12/23 labs  Hgb 7.9-8.9, MCV   Ferritin 1827, iron sat 60, TIBC 111, iron 67  Haptoglobin 177, absolute retic 0.1011  - 1/24 labs  Hgb 11.2, MCV 98  Ferritin 451, iron sat 39, TIBC 179, iron 69  B12 1082, RBC folate >1312  TSH 2.77  CMP Cr 1.72, GFR 41, calcium 9.6, total protein 7.1, albumin 3.8, total bili 0.3, absolute retic 0.111    PLAN:  - macrocytosis resolved, likely 2/2 elevated retic  - anemia 2/2 CKD, no reversible nutritional deficiencies  - check SPEP, SIFE, 24 hour urine UPEP and UIFE, kappa and lambda light chain ratio, quantitative serum immunoglobulins with next labs    #renal transplant  - [facility]  -  S/p cadaveric transplant in [facility] in 2014   - on tacrolimus and prednisone 10 mg daily (off cellcept since 12/23)  - 12/23 Cr 1.6, GFR 45  - 1/24 MRI peritoneum: Kidneys: Atrophic native kidneys. T1 hyperintense, non-enhancing hemorrhagic/proteinaceous cyst in the posterior mid pole of the left kidney. T2 hyperintense, nonenhancing right renal cortical cysts. Right lower  quadrant renal transplant with stable moderate hydronephrosis upstream of the ureterovesical junction. No suspicious renal transplant lesion.    PLAN:  - will reach out to Dr. Goodson regarding the above chemo regimen and to confirm which immunosuppressive medications pt is on (clarify regarding cellcept)      #type 2 diabetes  - avg BS 230s    # RLE swelling  - B/l LE doppler 12/8/23 negative for DVT  - monitor for s/s DVT    RTC 1st week of March for follow up with MICHAEL, labs, start chemo    ADDENDUM:  I discussed the above with the pts nephrologist Dr. Cat Goodson  She confirms pt is off of cellcept   His tacrolimus was discontinued 2/24, when pt was admitted for influenza and his prednisone was increased to 20mg  Plan to decrease prednisone back to 10mg when starting chemotherapy  Ok to proceed with 5FU and LV  If absolutely need to use avastin, can do this, need to watch kidney function/vasculature closely   Will start with 5FU/LV and see how pt tolerates. If not surgical candidate based on repeat scans, can add avastin at that time and have close follow up with nephrology.      Stacey Blunt, DO  Hematology/Oncology  [facility]

## 2024-02-09 NOTE — LETTER
2/9/2024         RE: Berny Estrada  [address] [facility] [address]        Dear Colleague,    Thank you for referring your patient, Berny Estrada, to the [facility]. Please see a copy of my visit note below.    [facility]    Hematology/Oncology Established Patient Follow-Up Note      Today's Date: 2/9/2024    Reason for follow-up: cecal adenocarcinoma     HISTORY OF PRESENT ILLNESS: Berny Estrada is a 73 year old male who presents for follow-up    Patient has medical history including right renal transplant (2014), hypertension, type 2 diabetes with proliferative diabetic retinopathy and long-term insulin use, CKD stage III, CAD, COPD, GERD, colon polyps (tubular adenoma), esophageal candidiasis, cholelithiasis, sleep apnea, hx of tobacco use (quit 50 years ago)    -12/1/23- 12/9/23 hospitalized 2/2 anemia, hgb 7.9 w/melena x few months (was on iron)  - 12/6/23 CEA 11.69, 12/7/23 CEA 10  - 12/14/2023 EGD: Patchy white plaques in proximal esophagus and midesophagus suspicious for candidiasis, otherwise normal  -12/14/2023 [facility]:  - Fungating partially obstructing, partially circumferential (involving two thirds of the lumen circumference) large mass found in the cecum, including IC valve, with oozing  - 4 sessile polyps in ascending colon, 3-5 mm in size  - 2 mm sessile polyp in transverse colon  - 2 sessile polyps in the ascending colon, 4-5 mm in size  - 6 sessile polyps in the rectosigmoid colon, 3-8 mm in size  - Medium sized external hemorrhoids  PATHOLOGY:  1.  MID ESOPHAGUS, BIOPSY:  - Squamous mucosa with superficial colonization by fungal organisms positive for PAS stain, morphologically compatible with Candida species    2.  COLON, CECUM MASS, BIOPSY:  - Adenocarcinoma, moderately differentiated  - Loss of expression of MLH1 and PMS2; intact expression of MSH2 and MSH6    - MLH1 promoter methylation: POSITIVE   - BRAF V600 mutation positive     3.  ASCENDING COLON, POLYPECTOMY:  - Sessile serrated adenoma with focal high-grade dysplasia  - Separate minute fragment of adenocarcinoma     4.  TRANSVERSE COLON, POLYPECTOMY:  - Tubular adenoma; negative for high-grade dysplasia     5.  DESCENDING COLON, POLYPECTOMY:  - Tubular adenoma; negative for high-grade dysplasia     6.  RECTOSIGMOID COLON, POLYPECTOMY:  - Sessile serrated adenoma with focal cytologic dysplasia  - No evidence of high-grade dysplasia or invasive carcinoma    -12/21/23 CT CAP without contrast:  -Cardiomegaly, coronary artery stenting calcifications  - Dilated main pulmonary artery, 3.8 cm  - Calcified hilar lymphadenopathy  - RML and lingular bronchiectatic changes with associated fibroatelectasis, may be secondary to chronic infectious/inflammatory process, Ill-defined groundglass opacities predominantly affecting DERIC and lingula, Dependent atelectasis  - Small left-sided pleural effusion  -Atrophic thyroid with ill-defined subcentimeter nodularity  - Cholelithiasis  - Atrophic appearance of native kidneys, right lower quadrant renal transplant with mild hydronephrosis  - 1.6 cm hyperattenuating lesion of left kidney  - Ill-defined soft tissue thickening about cecum, likely corresponding to known cecal mass  - Associated thickening of peritoneal planes in the region  - Enlarged 2.1 x 1.7 cm mesenteric node adjacent to cecum  - Borderline enlarged 0.9 cm perirectal node  IMPRESSION:   1. Cecal mass with enlarged nodes of the right lower quadrant  including a 2.1 x 1.7 cm lymph node, which are suspicious for  metastatic disease. Indeterminate thickening and nodularity of the  peritoneal surfaces near the cecal mass could be due to fluid or  mesenteric spread of disease.  3. Indeterminate 1.6 cm lesion of the left native kidney. Consider  dedicated renal MRI with contrast for further characterization.  4. Right lower  quadrant renal transplant with mild hydronephrosis.  5. Ill-defined groundglass opacities of the left upper lobe and  lingula suspicious for an infectious/inflammatory etiology. Additional  bronchiectatic and fibroatelectatic changes of the right middle lobe  and lingula suggesting a chronic inflammatory process.  6. Small left-sided pleural effusion.  7. Dilated main pulmonary artery as can be seen with pulmonary  arterial hypertension.  8. Cholelithiasis without findings to suggest acute cholecystitis.    -12/21/23 CEA 31.3      Pt denies change in stool caliber, diarrhea, abdominal pain, abdominal distention, nausea, vomiting, no weight loss. Stool is dark (on iron), reported to be black, no BRBPR, no clots. Pt reports weight loss 10/23-11/23 10-15 lbs, then gain 10 over past few months    Pt reports right leg swelling 12/23 onwards with some improvement and palpated knot in left leg. B/l LE doppler 12/8/23 negative for DVT    Regarding ECOG:  Significant decline over the past 6 months, currently using wheelchair and supplemental oxygen  Working with PT 2x/week  Uses cane and walker prn recently, a few months ago was more consistent  Wife helps with most ADLs (cooking, cleaning, groceries, driving). Is able to go to bathroom, shower and dress himself.  Spends about 50% of day in recliner per pt, less per wife  PT 2x/week     Family history:  Brother- colorectal cancer, age 61, s/p surgery, receiving chemotherapy    Regarding kidney transplant  - right kidney transplant 2014  - on tacrolimus and prednisone 10 mg daily (off cellcept since 12/23)  - 12/23 Cr 1.6, GFR 45    INTERIM HISTORY:  - 1/19/24 MRI peritoneum w/contrast:  -  No appreciable focal hepatic lesion, though evaluation is limited by marked respiratory motion artifact..   - Subcentimeter T2 hyperintense cystic foci  in the pancreatic head and body/tail. 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 Recommend attention on  follow-up.  -  Hypoenhancing cecal mass measuring approximately 4.0 x 5.8 x 5.7 cm just below the ileocecal valve.  - Enlarged right lower quadrant/pericecal mesenteric lymph  nodes measuring up to 2.0 cm short axis  - Small left pleural effusion with adjacent compressive  atelectasis. Cardiomegaly  -  Trace ascites, greatest in the pericecal region without  appreciable suspicious peritoneal nodularity    REVIEW OF SYSTEMS:   A 14 point ROS was reviewed with pertinent positives and negatives in the HPI.        HOME MEDICATIONS:  Current Outpatient Medications   Medication Sig Dispense Refill     albuterol (PROAIR HFA/PROVENTIL HFA/VENTOLIN HFA) 108 (90 Base) MCG/ACT inhaler INHALE 2 PUFFS BY INHALATION EVERY 6 HOURS AS NEEDED FOR SHORTNESS OF BREATH, FOR SECRETIONS       amLODIPine (NORVASC) 10 MG tablet Take 10 mg by mouth daily       carvedilol (COREG) 12.5 MG tablet Take 12.5 mg by mouth 2 times daily       insulin aspart U-10, diluted conc 10 units/mL, (NOVOLOG) 10 unit/ml injection Inject Subcutaneous 3 times daily (before meals) 10-16 units       INSULIN GLARGINE SC Inject 460 Units Subcutaneous At Bedtime 7/11 am       ipratropium - albuterol 0.5 mg/2.5 mg/3 mL (DUONEB) 0.5-2.5 (3) MG/3ML neb solution INHALE 3ML IN NEBULIZER BY INHALATION FOUR TIMES A DAY AS NEEDED FOR SHORTNESS OF BREATH       loratadine (CLARITIN) 10 MG tablet 10 mg       Magnesium Oxide 420 MG TABS Take 1 tablet by mouth 2 times daily       OMEPRAZOLE PO Take 1 tablet by mouth daily       predniSONE (DELTASONE) 10 MG tablet Take 1 tablet by mouth daily       tacrolimus (PROGRAF-GENERIC EQUIVALENT) 5 MG capsule Take 5 mg by mouth daily       tamsulosin (FLOMAX) 0.4 MG capsule Take 0.4 mg by mouth daily       tiotropium-olodaterol 2.5-2.5 MCG/ACT AERS INHALE 2 PUFFS BY INHALATION EVERY DAY FOR COPD       VITAMIN D, CHOLECALCIFEROL, PO Take 1 tablet by mouth once a week       ASPIRIN PO Take 81 mg by mouth       fluconazole (DIFLUCAN) 200 MG tablet 200 mg        ketorolac (ACULAR) 0.5 % ophthalmic solution Place 1 drop into the right eye 3 times daily 1 Bottle 1     mycophenolate, IDS 3865, (CELLCEPT) 250 MG capsule Take 2 tablets by mouth 2 times daily (Patient not taking: Reported on 1/18/2024)       prednisoLONE 5 MG TABS Take 1 tablet by mouth daily (Patient not taking: Reported on 1/18/2024)       prednisoLONE acetate (PRED FORTE) 1 % ophthalmic suspension Place 1 drop into the right eye 3 times daily 1 Bottle 1     ranitidine (ZANTAC) 150 MG capsule Take 1 tablet by mouth daily           ALLERGIES:  No Known Allergies      PAST MEDICAL HISTORY:  Past Medical History:   Diagnosis Date     Diabetic retinopathy (H)      GERD (gastroesophageal reflux disease)      Hypertension      Renal disease     renal transplant     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     Sleep apnea     has equipment  not always uses     Type 2 diabetes mellitus without complications (H)          PAST SURGICAL HISTORY:  Past Surgical History:   Procedure Laterality Date     EYE SURGERY  7/12/2016    CE IOL PPV RE     PANRETINAL PHOTOCOAGULATION (PRP) OD (RIGHT EYE)      last 9/18/13     PANRETINAL PHOTOCOAGULATION (PRP) OS (LEFT EYE)  4/15/14    [facility]     PHACOEMULSIFICATION CLEAR CORNEA WITH STANDARD IOL, VITRECTOMY PARSPLANA 25 GAGUE, COMBINED Right 7/12/2016    Procedure: COMBINED PHACOEMULSIFICATION CLEAR CORNEA WITH STANDARD INTRAOCULAR LENS IMPLANT, VITRECTOMY PARSPLANA 25 GAUGE;  Surgeon: Mireille Don MD;  Location: [facility]         SOCIAL HISTORY:  Social History     Socioeconomic History     Marital status:      Spouse name: Not on file     Number of children: Not on file     Years of education: Not on file     Highest education level: Not on file   Occupational History     Not on file   Tobacco Use     Smoking status: Former     Smokeless tobacco: Never   Substance and Sexual Activity     Alcohol use: Not on file     Drug use: Not on file     Sexual activity:  "Not on file   Other Topics Concern     Not on file   Social History Narrative     Not on file     Social Determinants of Health     Financial Resource Strain: Not on file   Food Insecurity: Not on file   Transportation Needs: Not on file   Physical Activity: Not on file   Stress: Not on file   Social Connections: Not on file   Interpersonal Safety: Not on file   Housing Stability: Not on file         FAMILY HISTORY:  Family History   Problem Relation Age of Onset     Glaucoma No family hx of      Macular Degeneration No family hx of          PHYSICAL EXAM:  Vital signs:  BP (!) 160/77   Pulse 70   Ht 1.778 m (5' 10\")   Wt 101.2 kg (223 lb)   SpO2 99%   BMI 32.00 kg/m       ECO  GENERAL/CONSTITUTIONAL: No acute distress. In wheelchair  EYES: Pupils are equal and round. Extraocular movements intact without nystagmus.  No scleral icterus.  RESPIRATORY: Equal chest rise.   MUSCULOSKELETAL: Warm and well-perfused, no cyanosis, clubbing. Right LE 2+ pitting edema to knee, left LE 1+ pitting edema to mid tibia  NEUROLOGIC: Cranial nerves are grossly intact. Alert, oriented to person, place and time, answers questions appropriately.  INTEGUMENTARY: No rashes or jaundice. Left tibia hyperpigmentation           LABS:   Latest Reference Range & Units 24 12:31   Sodium 135 - 145 mmol/L 138   Potassium 3.4 - 5.3 mmol/L 5.5 (H)   Chloride 98 - 107 mmol/L 103   Carbon Dioxide (CO2) 22 - 29 mmol/L 26   Urea Nitrogen 8.0 - 23.0 mg/dL 28.8 (H)   Creatinine 0.67 - 1.17 mg/dL 1.72 (H)   GFR Estimate >60 mL/min/1.73m2 41 (L)   Calcium 8.8 - 10.2 mg/dL 9.6   Anion Gap 7 - 15 mmol/L 9   Albumin 3.5 - 5.2 g/dL 3.8   Protein Total 6.4 - 8.3 g/dL 7.1   Alkaline Phosphatase 40 - 150 U/L 77   ALT 0 - 70 U/L 12   AST 0 - 45 U/L 18   Bilirubin Total <=1.2 mg/dL 0.3    <=38 U/mL 9   Cancer Antigen 19-9 <=35 U/mL <2   CEA ng/mL 48.5   Ferritin 31 - 409 ng/mL 451 (H)   RBC FOLATE  Rpt   Glucose 70 - 99 mg/dL 186 (H)   Iron 61 - " 157 ug/dL 69   Iron Binding Capacity 240 - 430 ug/dL 179 (L)   Iron Sat Index 15 - 46 % 39   Transferrin 200.0 - 360.0 mg/dL 151.0 (L)   TSH 0.30 - 4.20 uIU/mL 2.77   Vitamin B12 232 - 1,245 pg/mL 1,082   WBC 4.0 - 11.0 10e3/uL 6.4   Hemoglobin 13.3 - 17.7 g/dL 11.2 (L)   Hematocrit 40.0 - 53.0 % 35.7 (L)   Platelet Count 150 - 450 10e3/uL 172   RBC Count 4.40 - 5.90 10e6/uL 3.63 (L)   MCV 78 - 100 fL 98   MCH 26.5 - 33.0 pg 30.9   MCHC 31.5 - 36.5 g/dL 31.4 (L)   RDW 10.0 - 15.0 % 15.9 (H)   % Neutrophils % 71   % Lymphocytes % 19   % Monocytes % 8   % Eosinophils % 1   % Basophils % 0   Absolute Basophils 0.0 - 0.2 10e3/uL 0.0   Absolute Eosinophils 0.0 - 0.7 10e3/uL 0.1   Absolute Immature Granulocytes <=0.4 10e3/uL 0.1   Absolute Lymphocytes 0.8 - 5.3 10e3/uL 1.2   Absolute Monocytes 0.0 - 1.3 10e3/uL 0.5   % Immature Granulocytes % 1   Absolute Neutrophils 1.6 - 8.3 10e3/uL 4.5   Absolute NRBCs 10e3/uL 0.0   NRBCs per 100 WBC <1 /100 0   % Retic 0.5 - 2.0 % 3.1 (H)   Absolute Retic 0.025 - 0.095 10e6/uL 0.111 (H)   Prealbumin 20.0 - 40.0 mg/dL 23.4   (H): Data is abnormally high  (L): Data is abnormally low  Rpt: View report in Results Review for more information      PATHOLOGY:      IMAGING:  Narrative & Impression   Exam: MR PERITONEUM WO & W CONTRAST, 1/19/2024 3:17 PM     Indication: Malignant neoplasm of cecum (H)     Comparison: 12/21/2023     Technique: Images were acquired with and without intravenous contrast  through the abdomen. The following MR images were acquired: TrueFISP,  multiplanar T2 weighted, axial T1 in/out of phase, axial fat-saturated  T1, diffusion-weighted. Multiplanar T1-weighted images with fat  saturation were obtained before contrast administration and at  multiple time points following the administration of intravenous  contrast. Contrast dose: 10ml Gadavist     FINDINGS:     Liver: No hepatic side or iron deposition. No appreciable focal  hepatic lesion, though evaluation is  limited by marked respiratory  motion artifact..      Gallbladder/biliary tree: Cholelithiasis. No gallbladder wall  thickening or pericholecystic fluid. No intra or extrahepatic biliary  dilation.     Spleen: Normal.     Kidneys: Atrophic native kidneys. T1 hyperintense, nonenhancing  hemorrhagic/proteinaceous cyst in the posterior mid pole of the left  kidney. T2 hyperintense, nonenhancing right renal cortical cysts.  Right lower quadrant renal transplant with stable moderate  hydronephrosis upstream of the ureterovesical junction. No suspicious  renal transplant lesion.     Adrenal glands: Normal.     Pancreas: Moderately atrophic pancreas. No main pancreatic ductal  dilation or suspicious mass. Subcentimeter T2 hyperintense cystic foci  in the pancreatic head and body/tail.     Bowel: No dilated small or large bowel. Hypoenhancing cecal mass  measuring approximately 4.0 x 5.8 x 5.7 cm just below the ileocecal  valve.     Lymph nodes: Enlarged right lower quadrant/pericecal mesenteric lymph  nodes measuring up to 2.0 cm short axis (series 23 image 79).     Blood vessels: The major abdominal and pelvic vasculature is patent.     Lung bases: Small left pleural effusion with adjacent compressive  atelectasis. Cardiomegaly.     Bones and soft tissues: No aggressive osseous lesion.     Mesentery and abdominal wall: Subcutaneous edema in the left greater  than right abdominal wall. Right lower quadrant abdominal wall  incisional changes.     Ascites: Trace ascites, greatest in the pericecal region without  appreciable suspicious peritoneal nodularity.                                                                      IMPRESSION:   1. Cecal mass with associated right lower quadrant mesenteric  lymphadenopathy.  2. No appreciable suspicious hepatic lesion, though evaluation is  limited by marked respiratory motion artifact.  3. Trace ascites, greatest in the pericecal region without convincing  associated suspicious  peritoneal nodularity.  4. Right lower quadrant renal transplant with stable moderate  hydronephrosis upstream of the ureteropelvic junction.  5. Cholelithiasis.  6. 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 Recommend attention on  follow-up.  7. Hemorrhagic/proteinaceous cyst in the atrophic native left kidney.  8. Small left pleural effusion.     MICHAEL NICOLE, DO           ASSESSMENT/PLAN:  Berny Estrada is a 73 year old male with:      # moderately differentiated cecum adenocarcinoma, MSI-high (loss of MLH1 and PMS2, MLH1 promoter hypermethylated), BRAF V600 mutation positive  -12/14/23 diagnostic colonoscopy: Fungating partially obstructing, partially circumferential (involving two thirds of the lumen circumference) large mass found in the cecum, including IC valve, with oozing, PATHOLOGY: COLON, CECUM MASS, BIOPSY: Adenocarcinoma, moderately differentiated, Loss of expression of MLH1 and PMS2; intact expression of MSH2 and MSH6, MLH1 promoter methylation: POSITIVE ; BRAF V600 mutation positive (not Prasad syndrome)  - CRC NGS: Detected Alterations of Known or Potential Pathogenicity: BRAF V600E, TMB Score: 73.131 mut/Mb  -12/21/23 CT CAP without contrast: Cecal mass with enlarged nodes of the right lower quadrant including a 2.1 x 1.7 cm lymph node, which are suspicious for metastatic disease. Indeterminate thickening and nodularity of the peritoneal surfaces near the cecal mass could be due to fluid or mesenteric spread of disease. Indeterminate 1.6 cm lesion of the left native kidney. Small left-sided pleural effusion.  -12/7/23 CEA 10,  12/21/23 CEA 31.3, 1/24 CEA 48.5    -1/24 MR peritoneum: no peritoneal disease, Hypoenhancing cecal mass measuring approximately 4.0 x 5.8 x 5.7 cm just below the ileocecal Valve, Enlarged RLQ/pericecal mesenteric lymph nodes measuring up to 2.0 cm short axis     PLAN:  - pt has at least stage 3 disease with  enlarged pericecal mesenteric LN  - d/w Dr. Reid, will attempt to give chemotherapy up front for approximately 3 months and then restage to see if he is a surgical candidate  - ECOG 2 at baseline at best   - for right sided tumors w/BRAF mutation, FOLFIRINOX or FOLFIRI is used w/avastin, however given pts co-morbidities, I do not think the patient could tolerate this regimen. Will avoid immunotherapy given pt is on immunosuppressive medications for renal transplant   - we will start with infusional 5FU and LV and see how pt tolerates this  - monitor renal function, monitor cytopenias and QTc prolongation while on tacrolimus and fluconazole for renal transplant  - I will reach out to the pts nephrologist Dr. Goodson regarding the above  - discussed the regimen detailed below, schedule, AE, outcomes and pt consents to start tx as detailed below:    REGIMEN:  [id number]  q14 days, up to 24 weeks of perioperative chemo total   LV 350mg/m2 day 1  5FU 1200mg/m2 continuous infusion day 1-2 (total 2,400mg/m2 IV over 46-48 hours)  Emetic risk: moderate  Febrile neutropenia risk: low     - port placement  - chemo teaching      #macrocytic anemia  - per Care Everywhere labs:  12/23 labs  Hgb 7.9-8.9, MCV   Ferritin 1827, iron sat 60, TIBC 111, iron 67  Haptoglobin 177, absolute retic 0.1011  - 1/24 labs  Hgb 11.2, MCV 98  Ferritin 451, iron sat 39, TIBC 179, iron 69  B12 1082, RBC folate >1312  TSH 2.77  CMP Cr 1.72, GFR 41, calcium 9.6, total protein 7.1, albumin 3.8, total bili 0.3, absolute retic 0.111    PLAN:  - macrocytosis resolved, likely 2/2 elevated retic  - anemia 2/2 CKD, no reversible nutritional deficiencies  - check SPEP, SIFE, 24 hour urine UPEP and UIFE, kappa and lambda light chain ratio, quantitative serum immunoglobulins with next labs    #renal transplant  - [facility]  -  S/p cadaveric transplant in [facility] in 2014   - on tacrolimus and prednisone 10 mg daily (off  cellcept since 12/23)  - 12/23 Cr 1.6, GFR 45  - 1/24 MRI peritoneum: Kidneys: Atrophic native kidneys. T1 hyperintense, non-enhancing hemorrhagic/proteinaceous cyst in the posterior mid pole of the left kidney. T2 hyperintense, nonenhancing right renal cortical cysts. Right lower quadrant renal transplant with stable moderate hydronephrosis upstream of the ureterovesical junction. No suspicious renal transplant lesion.    PLAN:  - will reach out to Dr. Goodson regarding the above chemo regimen and to confirm which immunosuppressive medications pt is on (clarify regarding cellcept)      #type 2 diabetes  - avg BS 230s    # RLE swelling  - B/l LE doppler 12/8/23 negative for DVT  - monitor for s/s DVT    RTC 1st week of March for follow up with MICHAEL, labs, start chemo      Shaunna Bell DO  Hematology/Oncology  [facility]      Again, thank you for allowing me to participate in the care of your patient.        Sincerely,        SHAUNNA BELL DO

## 2024-02-11 ENCOUNTER — HEALTH MAINTENANCE LETTER (OUTPATIENT)
Age: 74
End: 2024-02-11

## 2024-02-12 ENCOUNTER — APPOINTMENT (OUTPATIENT)
Dept: ONCOLOGY | Facility: CLINIC | Age: 74
End: 2024-02-12
Payer: MEDICARE

## 2024-02-12 NOTE — TUMOR CONFERENCE
Tumor Conference Information  Tumor Conference: Colorectal  Specialties Present: Medical oncology, Radiation oncology, Pathology, Radiology, Surgery  Patient Status: Retrospective  Stage: cecal adenocarcinoma  Treatment to Date: None  Clinical Trials: Not discussed  Genetic Testing Discussed/Recommended?: No  Supportive Care Services Discussed/Recommended?: No  Recommended Plan: Follows evidence-based guidelines (Comment: chemo for 3 months 5FU then follow up restaging)  Did the review exceed 30 minutes?: did not           Documentation / Disclaimer Cancer Tumor Board Note  Cancer tumor board recommendations do not override what is determined to be reasonable care and treatment, which is dependent on the circumstances of a patient's case; the patient's medical, social, and personal concerns; and the clinical judgment of the oncologist [physician].

## 2024-02-14 PROCEDURE — 80197 ASSAY OF TACROLIMUS: CPT

## 2024-02-15 ENCOUNTER — LAB REQUISITION (OUTPATIENT)
Dept: LAB | Facility: CLINIC | Age: 74
End: 2024-02-15

## 2024-02-15 PROCEDURE — 80197 ASSAY OF TACROLIMUS: CPT

## 2024-02-16 LAB
TACROLIMUS BLD-MCNC: 1.3 UG/L (ref 5–15)
TACROLIMUS BLD-MCNC: 2.3 UG/L (ref 5–15)
TME LAST DOSE: ABNORMAL H

## 2024-02-19 RX ORDER — HEPARIN SODIUM,PORCINE 10 UNIT/ML
5-20 VIAL (ML) INTRAVENOUS DAILY PRN
Status: CANCELLED | OUTPATIENT
Start: 2024-04-06

## 2024-02-19 RX ORDER — HEPARIN SODIUM,PORCINE 10 UNIT/ML
5-20 VIAL (ML) INTRAVENOUS DAILY PRN
Status: CANCELLED | OUTPATIENT
Start: 2024-04-04

## 2024-02-19 RX ORDER — EPINEPHRINE 1 MG/ML
0.3 INJECTION, SOLUTION INTRAMUSCULAR; SUBCUTANEOUS EVERY 5 MIN PRN
Status: CANCELLED | OUTPATIENT
Start: 2024-04-04

## 2024-02-19 RX ORDER — ONDANSETRON 2 MG/ML
8 INJECTION INTRAMUSCULAR; INTRAVENOUS ONCE
Status: CANCELLED | OUTPATIENT
Start: 2024-04-04

## 2024-02-19 RX ORDER — HEPARIN SODIUM (PORCINE) LOCK FLUSH IV SOLN 100 UNIT/ML 100 UNIT/ML
5 SOLUTION INTRAVENOUS
Status: CANCELLED | OUTPATIENT
Start: 2024-04-06

## 2024-02-19 RX ORDER — DIPHENHYDRAMINE HYDROCHLORIDE 50 MG/ML
50 INJECTION INTRAMUSCULAR; INTRAVENOUS
Status: CANCELLED
Start: 2024-04-04

## 2024-02-19 RX ORDER — METHYLPREDNISOLONE SODIUM SUCCINATE 125 MG/2ML
125 INJECTION, POWDER, LYOPHILIZED, FOR SOLUTION INTRAMUSCULAR; INTRAVENOUS
Status: CANCELLED
Start: 2024-04-04

## 2024-02-19 RX ORDER — ALBUTEROL SULFATE 0.83 MG/ML
2.5 SOLUTION RESPIRATORY (INHALATION)
Status: CANCELLED | OUTPATIENT
Start: 2024-04-04

## 2024-02-19 RX ORDER — MEPERIDINE HYDROCHLORIDE 25 MG/ML
25 INJECTION INTRAMUSCULAR; INTRAVENOUS; SUBCUTANEOUS EVERY 30 MIN PRN
Status: CANCELLED | OUTPATIENT
Start: 2024-04-04

## 2024-02-19 RX ORDER — HEPARIN SODIUM (PORCINE) LOCK FLUSH IV SOLN 100 UNIT/ML 100 UNIT/ML
5 SOLUTION INTRAVENOUS
Status: CANCELLED | OUTPATIENT
Start: 2024-04-04

## 2024-02-19 RX ORDER — ALBUTEROL SULFATE 90 UG/1
1-2 AEROSOL, METERED RESPIRATORY (INHALATION)
Status: CANCELLED
Start: 2024-04-04

## 2024-02-27 ENCOUNTER — ANESTHESIA EVENT (OUTPATIENT)
Dept: SURGERY | Facility: AMBULATORY SURGERY CENTER | Age: 74
End: 2024-02-27
Payer: COMMERCIAL

## 2024-02-28 ENCOUNTER — HOSPITAL ENCOUNTER (OUTPATIENT)
Facility: AMBULATORY SURGERY CENTER | Age: 74
Discharge: HOME | End: 2024-02-28
Attending: STUDENT IN AN ORGANIZED HEALTH CARE EDUCATION/TRAINING PROGRAM | Admitting: STUDENT IN AN ORGANIZED HEALTH CARE EDUCATION/TRAINING PROGRAM
Payer: COMMERCIAL

## 2024-02-28 ENCOUNTER — ANCILLARY PROCEDURE (OUTPATIENT)
Dept: RADIOLOGY | Facility: AMBULATORY SURGERY CENTER | Age: 74
End: 2024-02-28
Attending: INTERNAL MEDICINE
Payer: COMMERCIAL

## 2024-02-28 ENCOUNTER — ANESTHESIA (OUTPATIENT)
Dept: SURGERY | Facility: AMBULATORY SURGERY CENTER | Age: 74
End: 2024-02-28
Payer: COMMERCIAL

## 2024-02-28 VITALS
SYSTOLIC BLOOD PRESSURE: 173 MMHG | HEART RATE: 75 BPM | DIASTOLIC BLOOD PRESSURE: 89 MMHG | WEIGHT: 218 LBS | BODY MASS INDEX: 31.21 KG/M2 | TEMPERATURE: 96.9 F | OXYGEN SATURATION: 99 % | RESPIRATION RATE: 18 BRPM | HEIGHT: 70 IN

## 2024-02-28 DIAGNOSIS — C18.0 MALIGNANT NEOPLASM OF CECUM (H): ICD-10-CM

## 2024-02-28 LAB
GLUCOSE BLDC GLUCOMTR-MCNC: 182 MG/DL (ref 70–99)
GLUCOSE BLDC GLUCOMTR-MCNC: 188 MG/DL (ref 70–99)
POTASSIUM SERPL-SCNC: 5 MMOL/L (ref 3.4–5.3)

## 2024-02-28 PROCEDURE — 36561 INSERT TUNNELED CV CATH: CPT | Mod: RT

## 2024-02-28 PROCEDURE — 36561 INSERT TUNNELED CV CATH: CPT | Performed by: ANESTHESIOLOGY

## 2024-02-28 PROCEDURE — 77001 FLUOROGUIDE FOR VEIN DEVICE: CPT | Mod: 26 | Performed by: STUDENT IN AN ORGANIZED HEALTH CARE EDUCATION/TRAINING PROGRAM

## 2024-02-28 PROCEDURE — 84132 ASSAY OF SERUM POTASSIUM: CPT | Performed by: PATHOLOGY

## 2024-02-28 PROCEDURE — 99100 ANES PT EXTEME AGE<1 YR&>70: CPT

## 2024-02-28 PROCEDURE — 82962 GLUCOSE BLOOD TEST: CPT | Performed by: PATHOLOGY

## 2024-02-28 PROCEDURE — 76937 US GUIDE VASCULAR ACCESS: CPT | Mod: 26 | Performed by: STUDENT IN AN ORGANIZED HEALTH CARE EDUCATION/TRAINING PROGRAM

## 2024-02-28 PROCEDURE — 36561 INSERT TUNNELED CV CATH: CPT | Mod: RT | Performed by: STUDENT IN AN ORGANIZED HEALTH CARE EDUCATION/TRAINING PROGRAM

## 2024-02-28 PROCEDURE — 99100 ANES PT EXTEME AGE<1 YR&>70: CPT | Performed by: ANESTHESIOLOGY

## 2024-02-28 PROCEDURE — 36561 INSERT TUNNELED CV CATH: CPT

## 2024-02-28 DEVICE — CATH PORT SMART VORTEX LOW PROFILE 8FR CT80LPPDVI: Type: IMPLANTABLE DEVICE | Site: CHEST | Status: FUNCTIONAL

## 2024-02-28 RX ORDER — SODIUM CHLORIDE, SODIUM LACTATE, POTASSIUM CHLORIDE, CALCIUM CHLORIDE 600; 310; 30; 20 MG/100ML; MG/100ML; MG/100ML; MG/100ML
INJECTION, SOLUTION INTRAVENOUS CONTINUOUS PRN
Status: DISCONTINUED | OUTPATIENT
Start: 2024-02-28 | End: 2024-02-28

## 2024-02-28 RX ORDER — OXYCODONE HYDROCHLORIDE 5 MG/1
5 TABLET ORAL
Status: DISCONTINUED | OUTPATIENT
Start: 2024-02-28 | End: 2024-02-29 | Stop reason: HOSPADM

## 2024-02-28 RX ORDER — ONDANSETRON 2 MG/ML
4 INJECTION INTRAMUSCULAR; INTRAVENOUS EVERY 30 MIN PRN
Status: DISCONTINUED | OUTPATIENT
Start: 2024-02-28 | End: 2024-02-29 | Stop reason: HOSPADM

## 2024-02-28 RX ORDER — LIDOCAINE HYDROCHLORIDE 20 MG/ML
INJECTION, SOLUTION INFILTRATION; PERINEURAL PRN
Status: DISCONTINUED | OUTPATIENT
Start: 2024-02-28 | End: 2024-02-28

## 2024-02-28 RX ORDER — LABETALOL HYDROCHLORIDE 5 MG/ML
20 INJECTION, SOLUTION INTRAVENOUS ONCE
Status: COMPLETED | OUTPATIENT
Start: 2024-02-28 | End: 2024-02-28

## 2024-02-28 RX ORDER — PROPOFOL 10 MG/ML
INJECTION, EMULSION INTRAVENOUS CONTINUOUS PRN
Status: DISCONTINUED | OUTPATIENT
Start: 2024-02-28 | End: 2024-02-28

## 2024-02-28 RX ORDER — HEPARIN SODIUM (PORCINE) LOCK FLUSH IV SOLN 100 UNIT/ML 100 UNIT/ML
SOLUTION INTRAVENOUS PRN
Status: DISCONTINUED | OUTPATIENT
Start: 2024-02-28 | End: 2024-02-28 | Stop reason: HOSPADM

## 2024-02-28 RX ORDER — LABETALOL 20 MG/4 ML (5 MG/ML) INTRAVENOUS SYRINGE
PRN
Status: DISCONTINUED | OUTPATIENT
Start: 2024-02-28 | End: 2024-02-28

## 2024-02-28 RX ORDER — LIDOCAINE HYDROCHLORIDE 10 MG/ML
INJECTION, SOLUTION INFILTRATION; PERINEURAL PRN
Status: DISCONTINUED | OUTPATIENT
Start: 2024-02-28 | End: 2024-02-28 | Stop reason: HOSPADM

## 2024-02-28 RX ORDER — NALOXONE HYDROCHLORIDE 0.4 MG/ML
0.1 INJECTION, SOLUTION INTRAMUSCULAR; INTRAVENOUS; SUBCUTANEOUS
Status: DISCONTINUED | OUTPATIENT
Start: 2024-02-28 | End: 2024-02-29 | Stop reason: HOSPADM

## 2024-02-28 RX ORDER — ACETAMINOPHEN 325 MG/1
975 TABLET ORAL ONCE
Status: COMPLETED | OUTPATIENT
Start: 2024-02-28 | End: 2024-02-28

## 2024-02-28 RX ORDER — OXYCODONE HYDROCHLORIDE 5 MG/1
10 TABLET ORAL
Status: DISCONTINUED | OUTPATIENT
Start: 2024-02-28 | End: 2024-02-29 | Stop reason: HOSPADM

## 2024-02-28 RX ORDER — SODIUM CHLORIDE, SODIUM LACTATE, POTASSIUM CHLORIDE, CALCIUM CHLORIDE 600; 310; 30; 20 MG/100ML; MG/100ML; MG/100ML; MG/100ML
INJECTION, SOLUTION INTRAVENOUS CONTINUOUS
Status: DISCONTINUED | OUTPATIENT
Start: 2024-02-28 | End: 2024-02-29 | Stop reason: HOSPADM

## 2024-02-28 RX ORDER — LIDOCAINE 40 MG/G
CREAM TOPICAL
Status: DISCONTINUED | OUTPATIENT
Start: 2024-02-28 | End: 2024-02-29 | Stop reason: HOSPADM

## 2024-02-28 RX ORDER — ONDANSETRON 4 MG/1
4 TABLET, ORALLY DISINTEGRATING ORAL EVERY 30 MIN PRN
Status: DISCONTINUED | OUTPATIENT
Start: 2024-02-28 | End: 2024-02-29 | Stop reason: HOSPADM

## 2024-02-28 RX ORDER — CEFAZOLIN SODIUM 2 G/50ML
2 SOLUTION INTRAVENOUS
Status: COMPLETED | OUTPATIENT
Start: 2024-02-28 | End: 2024-02-28

## 2024-02-28 RX ADMIN — PROPOFOL 150 MCG/KG/MIN: 10 INJECTION, EMULSION INTRAVENOUS at 10:44

## 2024-02-28 RX ADMIN — SODIUM CHLORIDE, SODIUM LACTATE, POTASSIUM CHLORIDE, CALCIUM CHLORIDE: 600; 310; 30; 20 INJECTION, SOLUTION INTRAVENOUS at 10:39

## 2024-02-28 RX ADMIN — SODIUM CHLORIDE, SODIUM LACTATE, POTASSIUM CHLORIDE, CALCIUM CHLORIDE: 600; 310; 30; 20 INJECTION, SOLUTION INTRAVENOUS at 10:18

## 2024-02-28 RX ADMIN — LABETALOL 20 MG/4 ML (5 MG/ML) INTRAVENOUS SYRINGE 10 MG: at 11:11

## 2024-02-28 RX ADMIN — LIDOCAINE HYDROCHLORIDE 80 MG: 20 INJECTION, SOLUTION INFILTRATION; PERINEURAL at 10:41

## 2024-02-28 RX ADMIN — ACETAMINOPHEN 975 MG: 325 TABLET ORAL at 10:06

## 2024-02-28 RX ADMIN — LABETALOL HYDROCHLORIDE 20 MG: 5 INJECTION, SOLUTION INTRAVENOUS at 11:53

## 2024-02-28 RX ADMIN — CEFAZOLIN SODIUM 2 G: 2 SOLUTION INTRAVENOUS at 10:39

## 2024-02-28 ASSESSMENT — LIFESTYLE VARIABLES: TOBACCO_USE: 1

## 2024-02-28 ASSESSMENT — COPD QUESTIONNAIRES: COPD: 1

## 2024-02-28 NOTE — ANESTHESIA CARE TRANSFER NOTE
Patient: Berny Estrada    Procedure: Procedure(s):  Insert port vascular access       Diagnosis: Malignant neoplasm of cecum (H) [C18.0]  Diagnosis Additional Information: No value filed.    Anesthesia Type:   MAC     Note:    Oropharynx: spontaneously breathing  Level of Consciousness: awake  Oxygen Supplementation: room air    Independent Airway: airway patency satisfactory and stable  Dentition: dentition unchanged  Vital Signs Stable: post-procedure vital signs reviewed and stable  Report to RN Given: handoff report given  Patient transferred to: Phase II    Handoff Report: Identifed the Patient, Identified the Reponsible Provider, Reviewed the pertinent medical history, Discussed the surgical course, Reviewed Intra-OP anesthesia mangement and issues during anesthesia, Set expectations for post-procedure period and Allowed opportunity for questions and acknowledgement of understanding      Vitals:  Vitals Value Taken Time   /95    Temp 96.7    Pulse 76    Resp     SpO2 98        Electronically Signed By: ONI Reid CRNA  February 28, 2024  11:43 AM

## 2024-02-28 NOTE — DISCHARGE INSTRUCTIONS
A collaboration between [facility] and [facility]  Experts in minimally invasive, targeted treatments performed using imaging guidance    Venous Access Device,  Port Catheter or Tunneled or Non-Tunneled Central Line Placement    Today you had a procedure today to install a venous access device; either a tunneled central vein catheter or a subcutaneous port catheter.    After you go home:  Drink plenty of fluids.  Generally 6-8 (8 ounce) glasses a day is recommended.  Resume your regular diet unless otherwise ordered by a medical provider.  Keep any applied tape/gauze dressings clean and dry.  Change tape/gauze dressings if they get wet or soiled.  You may shower the following day after procedure, however cover and protect from moisture any tape/gauze dressings.  You may let water hit and run over dried skin glue, but do not scrub.  Pat the area dry after showering.  Port placement incisions are closed with absorbable suture, meaning they do not need to be removed at a later date, and a topical skin adhesive (skin glue).  This glue will wear off in 7-14 days.  Do not remove before this time.  If 14 days have passed and residual glue is present, you may gently remove it.  Do not apply gels, lotions, or ointments to the glue site for the first 10 days as this may cause the glue to prematurely soften and fail.  Do not perform strenuous activities or lift greater than 10 pounds for the next three days.  If there is bleeding or oozing from the procedure site, apply firm pressure to the area for 5-10 minutes.  If the bleeding continues seek medical advice at the numbers below.  Mild procedure site discomfort can be treated with an ice pack and over-the-counter pain relievers.        For 24 hours after any sedation used:  Relax and take it easy.  No strenuous activities.  Do not drive or operate machines at home or at work.  No alcohol consumption.  Do not make any  important or legal decisions.    Call our Interventional Radiology (IR) service if:  If you start bleeding from the procedure site.  If you do start to bleed from the site, lie down and hold some pressure on the site.  Our radiology provider can help you decide if you need to return to the hospital.  If you have new or worsening pain related to the procedure.  If you have concerning swelling at the procedure site.  If you develop persistent nausea or vomiting.  If you develop hives or a rash or any unexplained itching.  If you have a fever of greater than 100.5  F and chills in the first 5 days after procedure.  Any other concerns related to your procedure.      [facility]  Interventional Radiology (IR)  [address]  [address] [facility] [place] [address]    Contact Number:  [phone]  (IR Nurse Triage)  Monday - Friday 7am - 4pm    After hours for urgent concerns:  [phone]  After 4pm Monday - Friday, Weekends and Holidays.   Ask for Interventional Radiology on-call.  Someone is available 24 hours a day.  [facility] toll free number:  [phone]

## 2024-02-28 NOTE — BRIEF OP NOTE
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  Implants:   Implant Name Type Inv. Item Serial No.  Lot No. LRB No. Used Action   CATH PORT SMART VORTEX LOW PROFILE 8FR [id number] - [id number] Catheter CATH PORT SMART VORTEX LOW PROFILE 8FR [id number]  [employer] [id number] Right 1 Implanted

## 2024-02-28 NOTE — ANESTHESIA PREPROCEDURE EVALUATION
Anesthesia Pre-Procedure Evaluation    Patient: Berny Estrada   MRN: [id number] : 1950        Procedure : Procedure(s):  Insert port vascular access          Past Medical History:   Diagnosis Date     Diabetic retinopathy (H)      GERD (gastroesophageal reflux disease)      Hypertension      Renal disease     renal transplant     Senile nuclear sclerosis 2014     Sleep apnea     has equipment  not always uses     Type 2 diabetes mellitus without complications (H)       Past Surgical History:   Procedure Laterality Date     EYE SURGERY  2016    CE IOL PPV RE     PANRETINAL PHOTOCOAGULATION (PRP) OD (RIGHT EYE)      last 13     PANRETINAL PHOTOCOAGULATION (PRP) OS (LEFT EYE)  4/15/14    [facility]     PHACOEMULSIFICATION CLEAR CORNEA WITH STANDARD IOL, VITRECTOMY PARSPLANA 25 GAGUE, COMBINED Right 2016    Procedure: COMBINED PHACOEMULSIFICATION CLEAR CORNEA WITH STANDARD INTRAOCULAR LENS IMPLANT, VITRECTOMY PARSPLANA 25 GAUGE;  Surgeon: Mireille Don MD;  Location: [facility]      No Known Allergies   Social History     Tobacco Use     Smoking status: Former     Smokeless tobacco: Never   Substance Use Topics     Alcohol use: Not on file      Wt Readings from Last 1 Encounters:   24 101.2 kg (223 lb)        Anesthesia Evaluation            ROS/MED HX  ENT/Pulmonary:     (+) sleep apnea,               tobacco use, Past use,         COPD,              Neurologic:  - neg neurologic ROS     Cardiovascular:     (+)  hypertension- -  CAD -  - -                                      METS/Exercise Tolerance:     Hematologic:  - neg hematologic  ROS     Musculoskeletal:  - neg musculoskeletal ROS     GI/Hepatic:     (+) GERD,  esophageal disease,                 Renal/Genitourinary: Comment: Recheck K today. Last one was 5.5    (+)     Pt has history of transplant, date: kidney ,        Endo:     (+)  type II DM,       Diabetic complications: retinopathy.            "  Psychiatric/Substance Use:  - neg psychiatric ROS     Infectious Disease:       Malignancy:  - neg malignancy ROS     Other:  - neg other ROS          Physical Exam    Airway        Mallampati: III   TM distance: > 3 FB   Neck ROM: full   Mouth opening: > 3 cm    Respiratory Devices and Support         Dental       (+) Edentulous      Cardiovascular   cardiovascular exam normal       Rhythm and rate: regular     Pulmonary   pulmonary exam normal        breath sounds clear to auscultation       OUTSIDE LABS:  CBC:   Lab Results   Component Value Date    WBC 6.4 01/18/2024    HGB 11.2 (L) 01/18/2024    HCT 35.7 (L) 01/18/2024     01/18/2024     BMP:   Lab Results   Component Value Date     01/18/2024    POTASSIUM 5.5 (H) 01/18/2024    CHLORIDE 103 01/18/2024    CO2 26 01/18/2024    BUN 28.8 (H) 01/18/2024    CR 1.72 (H) 01/18/2024     (H) 01/18/2024     COAGS: No results found for: \"PTT\", \"INR\", \"FIBR\"  POC:   Lab Results   Component Value Date     (H) 07/12/2016     HEPATIC:   Lab Results   Component Value Date    ALBUMIN 3.8 01/18/2024    PROTTOTAL 7.1 01/18/2024    ALT 12 01/18/2024    AST 18 01/18/2024    ALKPHOS 77 01/18/2024    BILITOTAL 0.3 01/18/2024     OTHER:   Lab Results   Component Value Date    VIVI 9.6 01/18/2024    TSH 2.77 01/18/2024       Anesthesia Plan    ASA Status:  3    NPO Status:  NPO Appropriate    Anesthesia Type: MAC.     - Reason for MAC: straight local not clinically adequate              Consents    Anesthesia Plan(s) and associated risks, benefits, and realistic alternatives discussed. Questions answered and patient/representative(s) expressed understanding.     - Discussed:     - Discussed with:  Patient      - Extended Intubation/Ventilatory Support Discussed: No.      - Patient is DNR/DNI Status: No     Use of blood products discussed: No .     Postoperative Care    Pain management: IV analgesics, Oral pain medications.   PONV prophylaxis: Background " "Propofol Infusion     Comments:               Sarah Nava MD    I have reviewed the pertinent notes and labs in the chart from the past 30 days and (re)examined the patient.  Any updates or changes from those notes are reflected in this note.              # Obesity: Estimated body mass index is 32 kg/m  as calculated from the following:    Height as of 2/9/24: 1.778 m (5' 10\").    Weight as of 2/9/24: 101.2 kg (223 lb).      "

## 2024-02-28 NOTE — ANESTHESIA POSTPROCEDURE EVALUATION
Patient: Berny Estrada    Procedure: Procedure(s):  Insert port vascular access       Anesthesia Type:  MAC    Note:  Disposition: Outpatient   Postop Pain Control: Uneventful            Sign Out: Well controlled pain   PONV: No   Neuro/Psych: Uneventful            Sign Out: Acceptable/Baseline neuro status   Airway/Respiratory: Uneventful            Sign Out: Acceptable/Baseline resp. status   CV/Hemodynamics:             Events: Refractory hypERtension            Sign Out: Acceptable CV status; No obvious hypovolemia; No obvious fluid overload   Other NRE: NONE   DID A NON-ROUTINE EVENT OCCUR? YES    Event details/Postop Comments:  Patient had high blood pressure intraprocedure (200/100) which continued in the PACU. Treated with labetolol intra-op and post operatively       Last vitals:  Vitals Value Taken Time   BP     Temp     Pulse     Resp     SpO2         Electronically Signed By: Sarah Nava MD  February 28, 2024  11:49 AM

## 2024-03-04 ENCOUNTER — VIRTUAL VISIT (OUTPATIENT)
Dept: ONCOLOGY | Facility: CLINIC | Age: 74
End: 2024-03-04
Attending: NURSE PRACTITIONER
Payer: COMMERCIAL

## 2024-03-04 DIAGNOSIS — C18.0 MALIGNANT NEOPLASM OF CECUM (H): Primary | ICD-10-CM

## 2024-03-04 DIAGNOSIS — Z94.0 KIDNEY REPLACED BY TRANSPLANT: ICD-10-CM

## 2024-03-04 DIAGNOSIS — E11.311 TYPE 2 DIABETES MELLITUS WITH RIGHT EYE AFFECTED BY RETINOPATHY AND MACULAR EDEMA, WITHOUT LONG-TERM CURRENT USE OF INSULIN, UNSPECIFIED RETINOPATHY SEVERITY (H): ICD-10-CM

## 2024-03-04 DIAGNOSIS — D53.9 MACROCYTIC ANEMIA: ICD-10-CM

## 2024-03-04 PROCEDURE — 99215 OFFICE O/P EST HI 40 MIN: CPT | Mod: 24 | Performed by: NURSE PRACTITIONER

## 2024-03-04 RX ORDER — LIDOCAINE/PRILOCAINE 2.5 %-2.5%
CREAM (GRAM) TOPICAL PRN
Qty: 30 G | Refills: 1 | Status: SHIPPED | OUTPATIENT
Start: 2024-03-04

## 2024-03-04 NOTE — PROGRESS NOTES
Virtual Visit Details    Type of service:  Video Visit 9:41-10:05 am    Originating Location (pt. 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   Berny Estrada is a 73 year old male with medical history including right renal transplant (2014), hypertension, type 2 diabetes with proliferative diabetic retinopathy and long-term insulin use, CKD stage III, CAD, COPD, GERD, colon polyps (tubular adenoma), esophageal candidiasis, cholelithiasis, sleep apnea, hx of tobacco use (quit 50 years ago)-  Presenting complaint of weight loss 10/23-11/23 10-15 lbs, then gain 10 over past few months and Significant ECOG decline over the past 6 months,  using wheelchair and supplemental oxygen  -12/1/23- 12/9/23 hospitalized 2/2 anemia, hgb 7.9 w/melena x few months (was on iron)  - 12/6/23 CEA 11.69, 12/7/23 CEA 10  - 12/14/2023 EGD: Patchy white plaques in proximal esophagus and midesophagus suspicious for candidiasis, otherwise normal  -12/14/2023 [facility]:  - Fungating partially obstructing, partially circumferential (involving two thirds of the lumen circumference) large mass found in the cecum, including IC valve, with oozing   - Adenocarcinoma, moderately differentiated   - Loss of expression of MLH1 and PMS2; intact expression of MSH2 and MSH6   - MLH1 promoter methylation: POSITIVE   - BRAF V600 mutation positive  - 4 sessile polyps in ascending colon, 3-5 mm in size- - Sessile serrated adenoma with focal high-grade dysplasia and Separate minute fragment of adenocarcinoma  - 2 mm sessile polyp in transverse colon- Tubular adenoma; negative for high-grade dysplasia  - 2 sessile polyps in the ascending colon, 4-5 mm in size- Tubular adenoma; negative for  high-grade dysplasia  - 6 sessile polyps in the rectosigmoid colon, 3-8 mm in size- Sessile serrated adenoma with focal cytologic dysplasia. No evidence of high-grade dysplasia or invasive carcinoma  - Medium sized external hemorrhoids  12/21/23 CT CAP without contrast:  -Cardiomegaly, coronary artery stenting calcifications  - Dilated main pulmonary artery, 3.8 cm  - Calcified hilar lymphadenopathy  - RML and lingular bronchiectatic changes with associated fibroatelectasis, may be secondary to chronic infectious/inflammatory process, Ill-defined groundglass opacities predominantly affecting DERIC and lingula, Dependent atelectasis  - Small left-sided pleural effusion  -Atrophic thyroid with ill-defined subcentimeter nodularity  - Cholelithiasis  - Atrophic appearance of native kidneys, right lower quadrant renal transplant with mild hydronephrosis  - 1.6 cm hyperattenuating lesion of left kidney  - Ill-defined soft tissue thickening about cecum, likely corresponding to known cecal mass  - Associated thickening of peritoneal planes in the region  - Enlarged 2.1 x 1.7 cm mesenteric node adjacent to cecum  - Borderline enlarged 0.9 cm perirectal node  IMPRESSION:   1. Cecal mass with enlarged nodes of the right lower quadrant  including a 2.1 x 1.7 cm lymph node, which are suspicious for  metastatic disease. Indeterminate thickening and nodularity of the  peritoneal surfaces near the cecal mass could be due to fluid or  mesenteric spread of disease.  3. Indeterminate 1.6 cm lesion of the left native kidney. Consider  dedicated renal MRI with contrast for further characterization.  4. Right lower quadrant renal transplant with mild hydronephrosis.  5. Ill-defined groundglass opacities of the left upper lobe and  lingula suspicious for an infectious/inflammatory etiology. Additional  bronchiectatic and fibroatelectatic changes of the right middle lobe  and lingula suggesting a chronic inflammatory process.  6. Small  left-sided pleural effusion.  7. Dilated main pulmonary artery as can be seen with pulmonary  arterial hypertension.  8. Cholelithiasis without findings to suggest acute cholecystitis.    -12/21/23 CEA 31.3    Family history:  Brother- colorectal cancer, age 61, s/p surgery, receiving chemotherapy    Regarding kidney transplant  - right kidney transplant 2014  - on tacrolimus and prednisone 10 mg daily (off cellcept since 12/23)  - 12/23 Cr 1.6, GFR 45  Treatment:   3/6/2024 to start  infusional 5FU and LV    Interval History: Mr Estrada and wife are seen today prior to start of treatment for his cecal cancer with  infusional 5FU and LV. Pt states he is taking fluids and eating well over last week. He did get port placed and state he is having very little pain or irritation to area. Discussed EMLA.   Fluconazole was taken previously for  thrush but appears to be resolved per pt and wife with no white coverage, change in taste or sore throat.   He is eating regular meals but appetite is not as usual and has lost much weight in last 6 months.   Bowels are normal without blood in stools.   Is anxious about getting started with chemotherapy.   Taking Tacrolimus with history of kidney transplant- no current fevers or rashes or new complaints.   Rest of comprehensive and complete ROS is reviewed and is negative.   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    Sleep apnea     has equipment  not always uses    Type 2 diabetes mellitus without complications (H)      Current Outpatient Medications   Medication    albuterol (PROAIR HFA/PROVENTIL HFA/VENTOLIN HFA) 108 (90 Base) MCG/ACT inhaler    amLODIPine (NORVASC) 10 MG tablet    ASPIRIN PO    carvedilol (COREG) 12.5 MG tablet    fluconazole (DIFLUCAN) 200 MG tablet    insulin aspart U-10, diluted conc 10 units/mL,  (NOVOLOG) 10 unit/ml injection    INSULIN GLARGINE SC    ipratropium - albuterol 0.5 mg/2.5 mg/3 mL (DUONEB) 0.5-2.5 (3) MG/3ML neb solution    ketorolac (ACULAR) 0.5 % ophthalmic solution    loratadine (CLARITIN) 10 MG tablet    Magnesium Oxide 420 MG TABS    mycophenolate, IDS 3865, (CELLCEPT) 250 MG capsule    OMEPRAZOLE PO    prednisoLONE acetate (PRED FORTE) 1 % ophthalmic suspension    predniSONE (DELTASONE) 10 MG tablet    ranitidine (ZANTAC) 150 MG capsule    tacrolimus (PROGRAF-GENERIC EQUIVALENT) 5 MG capsule    tamsulosin (FLOMAX) 0.4 MG capsule    tiotropium-olodaterol 2.5-2.5 MCG/ACT AERS    VITAMIN D, CHOLECALCIFEROL, PO     No current facility-administered medications for this visit.     No Known Allergies    Physical Exam:There were no vitals taken for this visit.   GENERAL: alert and no distress- cooperative with requests.  EYES: Eyes grossly normal to inspection.  No discharge or erythema, or obvious scleral/conjunctival abnormalities. Tongue without white coating.  RESP: No audible wheeze, cough, or visible cyanosis.    SKIN: Visible skin clear. No significant rash, abnormal pigmentation or lesions.  NEURO: Cranial nerves grossly intact.  Mentation and speech appropriate for age.  Did not move around with visit.   PSYCH: Appropriate affect, tone, and pace of words  The rest of a comprehensive physical examination is deferred due to video visit restrictions     Laboratory/Imaging Results:     Assessment and Plan:   Cecal Adenocarcinoma- Pt will be starting Leucovorin and infusional 5FU on 3/6/2024. We discussed administration and potential side effects of the plan and answered all questions. Permission for start of plan was given.   Port placed and is ready for use but discussed [facility] for use with first infusion.   Encouraged journaling his symptoms through first cycle.   Will see provider prior to each cycle for now.   Candida of the  esophagus- fluconozole dosing is completed and denies symptoms.   Weight loss/Nutrition- has lost weight even prior to diagnosis.Has diabetes and kidney transplant- will refer to dietician for education for pt and wife/daughter. WIll monitor weight with each cycle.   Kidney transplant- pt using tacrolimus and working closely with transplant team- aware of his cancer diagnosis. Using amlodipine and carvedilol for help with BPs  DM Type 2 - admits blood sugars are not well controlled. States they will be contacting endocrinology this week. Insulin dependant and feels the short acting insulin is not as effective as previous. Shared that BSs may increase with steroids given with plan.   The total time of this encounter amounted to  40 minutes. This time included extended video time time spent with the patient, prep work, ordering tests, and performing post visit documentation.  Anna Bonilla,Cnp

## 2024-03-04 NOTE — LETTER
3/4/2024         RE: Berny Estrada  [address] [address]  [place] [facility] [place] [facility] [address]        Dear Colleague,    Thank you for referring your patient, Berny Estrada, to the [facility]. Please see a copy of my visit note below.    Virtual Visit Details    Type of service:  Video Visit 9:41-10:05 am    Originating Location (pt. 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   Berny Estrada is a 73 year old male with medical history including right renal transplant (2014), hypertension, type 2 diabetes with proliferative diabetic retinopathy and long-term insulin use, CKD stage III, CAD, COPD, GERD, colon polyps (tubular adenoma), esophageal candidiasis, cholelithiasis, sleep apnea, hx of tobacco use (quit 50 years ago)-  Presenting complaint of weight loss 10/23-11/23 10-15 lbs, then gain 10 over past few months and Significant ECOG decline over the past 6 months,  using wheelchair and supplemental oxygen  -12/1/23- 12/9/23 hospitalized 2/2 anemia, hgb 7.9 w/melena x few months (was on iron)  - 12/6/23 CEA 11.69, 12/7/23 CEA 10  - 12/14/2023 EGD: Patchy white plaques in proximal esophagus and midesophagus suspicious for candidiasis, otherwise normal  -12/14/2023 [facility]:  - Fungating partially obstructing, partially circumferential (involving two thirds of the lumen circumference) large mass found in the cecum, including IC valve, with oozing   - Adenocarcinoma, moderately differentiated   - Loss of expression of MLH1 and PMS2; intact expression of MSH2 and MSH6   - MLH1 promoter methylation: POSITIVE   - BRAF V600 mutation positive  - 4 sessile polyps in ascending colon, 3-5 mm in size- - Sessile  serrated adenoma with focal high-grade dysplasia and Separate minute fragment of adenocarcinoma  - 2 mm sessile polyp in transverse colon- Tubular adenoma; negative for high-grade dysplasia  - 2 sessile polyps in the ascending colon, 4-5 mm in size- Tubular adenoma; negative for high-grade dysplasia  - 6 sessile polyps in the rectosigmoid colon, 3-8 mm in size- Sessile serrated adenoma with focal cytologic dysplasia. No evidence of high-grade dysplasia or invasive carcinoma  - Medium sized external hemorrhoids  12/21/23 CT CAP without contrast:  -Cardiomegaly, coronary artery stenting calcifications  - Dilated main pulmonary artery, 3.8 cm  - Calcified hilar lymphadenopathy  - RML and lingular bronchiectatic changes with associated fibroatelectasis, may be secondary to chronic infectious/inflammatory process, Ill-defined groundglass opacities predominantly affecting DERIC and lingula, Dependent atelectasis  - Small left-sided pleural effusion  -Atrophic thyroid with ill-defined subcentimeter nodularity  - Cholelithiasis  - Atrophic appearance of native kidneys, right lower quadrant renal transplant with mild hydronephrosis  - 1.6 cm hyperattenuating lesion of left kidney  - Ill-defined soft tissue thickening about cecum, likely corresponding to known cecal mass  - Associated thickening of peritoneal planes in the region  - Enlarged 2.1 x 1.7 cm mesenteric node adjacent to cecum  - Borderline enlarged 0.9 cm perirectal node  IMPRESSION:   1. Cecal mass with enlarged nodes of the right lower quadrant  including a 2.1 x 1.7 cm lymph node, which are suspicious for  metastatic disease. Indeterminate thickening and nodularity of the  peritoneal surfaces near the cecal mass could be due to fluid or  mesenteric spread of disease.  3. Indeterminate 1.6 cm lesion of the left native kidney. Consider  dedicated renal MRI with contrast for further characterization.  4. Right lower quadrant renal transplant with mild  hydronephrosis.  5. Ill-defined groundglass opacities of the left upper lobe and  lingula suspicious for an infectious/inflammatory etiology. Additional  bronchiectatic and fibroatelectatic changes of the right middle lobe  and lingula suggesting a chronic inflammatory process.  6. Small left-sided pleural effusion.  7. Dilated main pulmonary artery as can be seen with pulmonary  arterial hypertension.  8. Cholelithiasis without findings to suggest acute cholecystitis.    -12/21/23 CEA 31.3    Family history:  Brother- colorectal cancer, age 61, s/p surgery, receiving chemotherapy    Regarding kidney transplant  - right kidney transplant 2014  - on tacrolimus and prednisone 10 mg daily (off cellcept since 12/23)  - 12/23 Cr 1.6, GFR 45  Treatment:   3/6/2024 to start  infusional 5FU and LV    Interval History: Mr Estrada and wife are seen today prior to start of treatment for his cecal cancer with  infusional 5FU and LV. Pt states he is taking fluids and eating well over last week. He did get port placed and state he is having very little pain or irritation to area. Discussed EMLA.   Fluconazole was taken previously for  thrush but appears to be resolved per pt and wife with no white coverage, change in taste or sore throat.   He is eating regular meals but appetite is not as usual and has lost much weight in last 6 months.   Bowels are normal without blood in stools.   Is anxious about getting started with chemotherapy.   Taking Tacrolimus with history of kidney transplant- no current fevers or rashes or new complaints.   Rest of comprehensive and complete ROS is reviewed and is negative.   Past Medical History:   Diagnosis Date     Diabetic retinopathy (H)      GERD (gastroesophageal reflux disease)      Hypertension      Renal disease     renal transplant     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     Sleep apnea     has equipment  not always uses     Type 2 diabetes mellitus without complications (H)      Current  Outpatient Medications   Medication     albuterol (PROAIR HFA/PROVENTIL HFA/VENTOLIN HFA) 108 (90 Base) MCG/ACT inhaler     amLODIPine (NORVASC) 10 MG tablet     ASPIRIN PO     carvedilol (COREG) 12.5 MG tablet     fluconazole (DIFLUCAN) 200 MG tablet     insulin aspart U-10, diluted conc 10 units/mL, (NOVOLOG) 10 unit/ml injection     INSULIN GLARGINE SC     ipratropium - albuterol 0.5 mg/2.5 mg/3 mL (DUONEB) 0.5-2.5 (3) MG/3ML neb solution     ketorolac (ACULAR) 0.5 % ophthalmic solution     loratadine (CLARITIN) 10 MG tablet     Magnesium Oxide 420 MG TABS     mycophenolate, IDS 3865, (CELLCEPT) 250 MG capsule     OMEPRAZOLE PO     prednisoLONE acetate (PRED FORTE) 1 % ophthalmic suspension     predniSONE (DELTASONE) 10 MG tablet     ranitidine (ZANTAC) 150 MG capsule     tacrolimus (PROGRAF-GENERIC EQUIVALENT) 5 MG capsule     tamsulosin (FLOMAX) 0.4 MG capsule     tiotropium-olodaterol 2.5-2.5 MCG/ACT AERS     VITAMIN D, CHOLECALCIFEROL, PO     No current facility-administered medications for this visit.     No Known Allergies    Physical Exam:There were no vitals taken for this visit.   GENERAL: alert and no distress- cooperative with requests.  EYES: Eyes grossly normal to inspection.  No discharge or erythema, or obvious scleral/conjunctival abnormalities. Tongue without white coating.  RESP: No audible wheeze, cough, or visible cyanosis.    SKIN: Visible skin clear. No significant rash, abnormal pigmentation or lesions.  NEURO: Cranial nerves grossly intact.  Mentation and speech appropriate for age.  Did not move around with visit.   PSYCH: Appropriate affect, tone, and pace of words  The rest of a comprehensive physical examination is deferred due to video visit restrictions     Laboratory/Imaging Results:     Assessment and Plan:   Cecal Adenocarcinoma- Pt will be starting Leucovorin and infusional 5FU on 3/6/2024. We discussed administration and potential side effects of the plan and answered all  questions. Permission for start of plan was given.   Port placed and is ready for use but discussed and [facility]- hoping to get this shipped over night for use with first infusion.   Encouraged journaling his symptoms through first cycle.   Will see provider prior to each cycle for now.   Candida of the esophagus- fluconozole dosing is completed and denies symptoms.   Weight loss/Nutrition- has lost weight even prior to diagnosis.Has diabetes and kidney transplant- will refer to dietician for education for pt and wife/daughter. WIll monitor weight with each cycle.   Kidney transplant- pt using tacrolimus and working closely with transplant team- aware of his cancer diagnosis. Using amlodipine and carvedilol for help with BPs  DM Type 2 - admits blood sugars are not well controlled. States they will be contacting endocrinology this week. Insulin dependant and feels the short acting insulin is not as effective as previous. Shared that BSs may increase with steroids given with plan.   The total time of this encounter amounted to  40 minutes. This time included extended video time time spent with the patient, prep work, ordering tests, and performing post visit documentation.  Anna Bonilla Cnp       Again, thank you for allowing me to participate in the care of your patient.        Sincerely,        Anna Bonilla, AURA, APRN CNP

## 2024-03-04 NOTE — NURSING NOTE
Is the patient currently in the state of [place]? YES    Visit mode:VIDEO    If the visit is dropped, the patient can be reconnected by: VIDEO VISIT: Send to e-mail at: [email or web address]    Will anyone else be joining the visit? NO  (If patient encounters technical issues they should call [phone])    How would you like to obtain your AVS? MyChart    Are changes needed to the allergy or medication list? No    Reason for visit: JENELLE PALMA

## 2024-03-05 ENCOUNTER — PATIENT OUTREACH (OUTPATIENT)
Dept: ONCOLOGY | Facility: CLINIC | Age: 74
End: 2024-03-05
Payer: MEDICARE

## 2024-03-05 NOTE — PROGRESS NOTES
"[facility]: Cancer Care Initial Note                                    Discussion with Patient:                                                      [facility] Clinics: Chemotherapy Education Notes    Chemotherapy education was completed with patient today over the phone. Handouts from [facility] were discussed and provided to the patient to take home, and were also sent to patient via mail.  Reviewed the following information with the patient and their support person(s):     Chemotherapy Regimen and Schedule: FOLFOX 6, with infusions every 2 weeks.  3         cycles are planned.    Tests and/or procedures required prior to chemotherapy start:   N/A all complete    General Chemotherapy Information: Specific medication names and medication delivery methods; possible chemotherapy side effects and management of side effects, including but not limited to: skin changes/nail changes, anemia, neutropenia, thrombocytopenia, diarrhea/constipation, nausea/vomiting, hair loss, memory changes, mouth sores, taste changes, appetite changes, peripheral neuropathy, fatigue, infertility, myelosuppression, increased risk for infection, increased risk for bleeding and/or bruising, possible allergic or hypersensitivity reaction.   Also reviewed signs/symptoms that should be reported to care team or on-call provider immediately, including: temperature of 100.4 degrees or higher, shortness of breath, chest pain, unusual bruising, bleeding symptoms, extreme fatigue, >4-6 episodes of diarrhea in 24 hours, uncontrolled nausea/vomiting, symptoms of dehydration, or signs of an allergic reaction.        Written Information: Patient was provided with the following handouts from [facility]: \"Getting Ready for Chemotherapy: What to Expect, Before, During, and After your Treatment,\" \"Eating Hints: Before, During, and After Cancer Treatment,\" printouts on possible chemotherapy side effects/ways to cope with side effects, \"When to Call " "the Doctor,\" and \"Self-Care Tips During Cancer Treatment.\"  Patient was also provided with drug-specific handouts from [facility].  Patient was also provided with information regarding various programs offered at [facility], hair loss resources (if applicable), a list of resources for cancer patients, as well as important contact information for our cancer clinic including scheduling team, nurse triage line, direct phone number for RN Care Coordinator, and the after-hours Nurse Advice Line.    No barriers to learning identified. Patient and spouse verbalized understanding of all written and verbal information. All questions answered to patient s satisfaction.  Learning barriers and method preference are documented in the patient education flowsheet. Patient states understanding and is in agreement with this plan.  Patient understands that they will be receiving a phone call from a member of our scheduling team to schedule future appointments.  Patient instructed to call with further questions or concerns.     Assessment:                                                      Initial  Current living arrangement:: I live in a private home with spouse  Informal Support system:: Family  Equipment Currently Used at Home: none  Bed or wheelchair confined:: No  Mobility Status: Independent  Transportation means:: Regular car  Medication adherence problem (GOAL):: No  Knowledgeable about how to use meds:: Yes  Medication side effects suspected:: Yes  Advanced Care Plans/Directives on file:: No  Patient Reported Pain?: No    Plan of Care Education Review:   Assessment completed with:: Patient;Spouse or significant other    Plan of Care Education   Diagnosis:: Malignant neoplasm of cecum  Does patient understand diagnosis?: Yes  Tx plan/regimen:: FOLFOX-6  Does patient understand treatment plan/regimen?: Yes  Preparing for treatment:: Reviewed treatment preparation information with patient (vascular access, day " of chemo, visitor policy, what to bring, etc.)  Vascular access education provided for:: Port  Side effect education:: Diarrhea/Constipation;DVT/PE;Endocrine effects;Fatigue;Hair loss;Immune-mediated effects;Infection;Lab value monitoring (anemia, neutropenia, thrombocytopenia);Mouth sores;Mylosuppression;Nausea/Vomiting;Neuropathy;Skin changes  Safety/self care at home reviewed with patient:: Yes  Coping - concerns/fears reviewed with patient:: Yes  Plan of Care:: Lab appointment;Treatment schedule  When to call provider:: Bleeding;Increased shortness of breath;New/worsening pain;Shaking chills;Temperature >100.4F;Uncontrolled diarrhea/constipation;Uncontrolled nausea/vomiting  Reasons for deferring treatment reviewed with patient:: Yes    Evaluation of Learning  Patient Education Provided: Yes  Readiness:: Acceptance  Method:: Literature;Explanation;Booklet/Handout  Response:: Verbalizes understanding           Intervention/Education provided during outreach:                                                      RNCC to follow-up after c1d1 for check in. Patient/family have direct desk line and triage number should they need anything.    Katarzyna Deal, RN, BSN  RN Care Coordinator - Oncology/Hematology  [facility]

## 2024-03-21 ENCOUNTER — INFUSION THERAPY VISIT (OUTPATIENT)
Dept: INFUSION THERAPY | Facility: CLINIC | Age: 74
End: 2024-03-21
Attending: NURSE PRACTITIONER
Payer: MEDICARE

## 2024-03-21 ENCOUNTER — ONCOLOGY VISIT (OUTPATIENT)
Dept: ONCOLOGY | Facility: CLINIC | Age: 74
End: 2024-03-21
Attending: NURSE PRACTITIONER
Payer: MEDICARE

## 2024-03-21 VITALS
OXYGEN SATURATION: 99 % | HEART RATE: 76 BPM | RESPIRATION RATE: 16 BRPM | DIASTOLIC BLOOD PRESSURE: 81 MMHG | HEIGHT: 70 IN | WEIGHT: 231 LBS | BODY MASS INDEX: 33.07 KG/M2 | SYSTOLIC BLOOD PRESSURE: 163 MMHG | TEMPERATURE: 97.8 F

## 2024-03-21 DIAGNOSIS — C18.0 MALIGNANT NEOPLASM OF CECUM (H): ICD-10-CM

## 2024-03-21 DIAGNOSIS — D53.9 MACROCYTIC ANEMIA: ICD-10-CM

## 2024-03-21 DIAGNOSIS — E11.311 TYPE 2 DIABETES MELLITUS WITH RIGHT EYE AFFECTED BY RETINOPATHY AND MACULAR EDEMA, WITHOUT LONG-TERM CURRENT USE OF INSULIN, UNSPECIFIED RETINOPATHY SEVERITY (H): ICD-10-CM

## 2024-03-21 DIAGNOSIS — Z94.0 KIDNEY REPLACED BY TRANSPLANT: ICD-10-CM
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
## 2024-03-21 DIAGNOSIS — C18.0 MALIGNANT NEOPLASM OF CECUM (H): Primary | ICD-10-CM

## 2024-03-21 DIAGNOSIS — R53.83 FATIGUE: Primary | ICD-10-CM

## 2024-03-21 LAB
ALBUMIN SERPL BCG-MCNC: 3.5 G/DL (ref 3.5–5.2)
ALP SERPL-CCNC: 55 U/L (ref 40–150)
ALT SERPL W P-5'-P-CCNC: 10 U/L (ref 0–70)
ANION GAP SERPL CALCULATED.3IONS-SCNC: 7 MMOL/L (ref 7–15)
AST SERPL W P-5'-P-CCNC: 13 U/L (ref 0–45)
BASOPHILS # BLD AUTO: 0 10E3/UL (ref 0–0.2)
BASOPHILS NFR BLD AUTO: 0 %
BILIRUB SERPL-MCNC: 0.4 MG/DL
BUN SERPL-MCNC: 32.9 MG/DL (ref 8–23)
CALCIUM SERPL-MCNC: 9.6 MG/DL (ref 8.8–10.2)
CHLORIDE SERPL-SCNC: 102 MMOL/L (ref 98–107)
CREAT SERPL-MCNC: 1.79 MG/DL (ref 0.67–1.17)
DEPRECATED HCO3 PLAS-SCNC: 28 MMOL/L (ref 22–29)
EGFRCR SERPLBLD CKD-EPI 2021: 40 ML/MIN/1.73M2
EOSINOPHIL # BLD AUTO: 0 10E3/UL (ref 0–0.7)
EOSINOPHIL NFR BLD AUTO: 0 %
ERYTHROCYTE [DISTWIDTH] IN BLOOD BY AUTOMATED COUNT: 14.6 % (ref 10–15)
GLUCOSE SERPL-MCNC: 248 MG/DL (ref 70–99)
HCT VFR BLD AUTO: 34 % (ref 40–53)
HGB BLD-MCNC: 10.6 G/DL (ref 13.3–17.7)
IMM GRANULOCYTES # BLD: 0.1 10E3/UL
IMM GRANULOCYTES NFR BLD: 1 %
LYMPHOCYTES # BLD AUTO: 1.2 10E3/UL (ref 0.8–5.3)
LYMPHOCYTES NFR BLD AUTO: 15 %
MCH RBC QN AUTO: 31.2 PG (ref 26.5–33)
MCHC RBC AUTO-ENTMCNC: 31.2 G/DL (ref 31.5–36.5)
MCV RBC AUTO: 100 FL (ref 78–100)
MONOCYTES # BLD AUTO: 0.5 10E3/UL (ref 0–1.3)
MONOCYTES NFR BLD AUTO: 6 %
NEUTROPHILS # BLD AUTO: 6.3 10E3/UL (ref 1.6–8.3)
NEUTROPHILS NFR BLD AUTO: 78 %
NRBC # BLD AUTO: 0 10E3/UL
NRBC BLD AUTO-RTO: 0 /100
PLATELET # BLD AUTO: 148 10E3/UL (ref 150–450)
POTASSIUM SERPL-SCNC: 5 MMOL/L (ref 3.4–5.3)
PROT SERPL-MCNC: 6.3 G/DL (ref 6.4–8.3)
RBC # BLD AUTO: 3.4 10E6/UL (ref 4.4–5.9)
SODIUM SERPL-SCNC: 137 MMOL/L (ref 135–145)
TOTAL PROTEIN SERUM FOR ELP: 6 G/DL (ref 6.4–8.3)
WBC # BLD AUTO: 8.1 10E3/UL (ref 4–11)

## 2024-03-21 PROCEDURE — 96375 TX/PRO/DX INJ NEW DRUG ADDON: CPT

## 2024-03-21 PROCEDURE — 258N000003 HC RX IP 258 OP 636: Performed by: INTERNAL MEDICINE

## 2024-03-21 PROCEDURE — G0463 HOSPITAL OUTPT CLINIC VISIT: HCPCS | Mod: 25 | Performed by: NURSE PRACTITIONER

## 2024-03-21 PROCEDURE — 99207 PR NO CHARGE LOS: CPT

## 2024-03-21 PROCEDURE — 86334 IMMUNOFIX E-PHORESIS SERUM: CPT | Performed by: STUDENT IN AN ORGANIZED HEALTH CARE EDUCATION/TRAINING PROGRAM

## 2024-03-21 PROCEDURE — 96365 THER/PROPH/DIAG IV INF INIT: CPT | Mod: XU

## 2024-03-21 PROCEDURE — 99215 OFFICE O/P EST HI 40 MIN: CPT | Performed by: NURSE PRACTITIONER

## 2024-03-21 PROCEDURE — 85004 AUTOMATED DIFF WBC COUNT: CPT | Performed by: INTERNAL MEDICINE

## 2024-03-21 PROCEDURE — 80053 COMPREHEN METABOLIC PANEL: CPT | Performed by: INTERNAL MEDICINE

## 2024-03-21 PROCEDURE — 250N000011 HC RX IP 250 OP 636: Performed by: INTERNAL MEDICINE

## 2024-03-21 PROCEDURE — 83521 IG LIGHT CHAINS FREE EACH: CPT | Mod: 91 | Performed by: INTERNAL MEDICINE

## 2024-03-21 PROCEDURE — 86334 IMMUNOFIX E-PHORESIS SERUM: CPT | Mod: 26 | Performed by: STUDENT IN AN ORGANIZED HEALTH CARE EDUCATION/TRAINING PROGRAM

## 2024-03-21 PROCEDURE — 84165 PROTEIN E-PHORESIS SERUM: CPT | Mod: TC | Performed by: STUDENT IN AN ORGANIZED HEALTH CARE EDUCATION/TRAINING PROGRAM

## 2024-03-21 PROCEDURE — 84155 ASSAY OF PROTEIN SERUM: CPT | Performed by: INTERNAL MEDICINE

## 2024-03-21 PROCEDURE — G0498 CHEMO EXTEND IV INFUS W/PUMP: HCPCS

## 2024-03-21 PROCEDURE — 36415 COLL VENOUS BLD VENIPUNCTURE: CPT | Performed by: INTERNAL MEDICINE

## 2024-03-21 PROCEDURE — 84165 PROTEIN E-PHORESIS SERUM: CPT | Mod: 26 | Performed by: STUDENT IN AN ORGANIZED HEALTH CARE EDUCATION/TRAINING PROGRAM

## 2024-03-21 PROCEDURE — 96367 TX/PROPH/DG ADDL SEQ IV INF: CPT

## 2024-03-21 RX ORDER — PROCHLORPERAZINE MALEATE 10 MG
10 TABLET ORAL EVERY 6 HOURS PRN
Qty: 30 TABLET | Refills: 2 | Status: SHIPPED | OUTPATIENT
Start: 2024-03-21

## 2024-03-21 RX ORDER — ONDANSETRON 8 MG/1
8 TABLET, FILM COATED ORAL EVERY 8 HOURS PRN
Qty: 30 TABLET | Refills: 2 | Status: SHIPPED | OUTPATIENT
Start: 2024-03-21

## 2024-03-21 RX ORDER — ONDANSETRON 2 MG/ML
8 INJECTION INTRAMUSCULAR; INTRAVENOUS ONCE
Status: COMPLETED | OUTPATIENT
Start: 2024-03-21 | End: 2024-03-21

## 2024-03-21 RX ORDER — DEXAMETHASONE 4 MG/1
8 TABLET ORAL DAILY
Qty: 4 TABLET | Refills: 2 | Status: SHIPPED | OUTPATIENT
Start: 2024-03-21 | End: 2024-04-04

## 2024-03-21 RX ORDER — HEPARIN SODIUM (PORCINE) LOCK FLUSH IV SOLN 100 UNIT/ML 100 UNIT/ML
5 SOLUTION INTRAVENOUS
Status: DISCONTINUED | OUTPATIENT
Start: 2024-03-21 | End: 2024-03-21 | Stop reason: HOSPADM

## 2024-03-21 RX ADMIN — Medication 5 ML: at 08:58

## 2024-03-21 RX ADMIN — FOSAPREPITANT: 150 INJECTION, POWDER, LYOPHILIZED, FOR SOLUTION INTRAVENOUS at 09:13

## 2024-03-21 RX ADMIN — LEUCOVORIN CALCIUM 800 MG: 500 INJECTION, POWDER, LYOPHILIZED, FOR SOLUTION INTRAMUSCULAR; INTRAVENOUS at 09:42

## 2024-03-21 RX ADMIN — ONDANSETRON 8 MG: 2 INJECTION INTRAMUSCULAR; INTRAVENOUS at 09:02

## 2024-03-21 ASSESSMENT — PAIN SCALES - GENERAL: PAINLEVEL: NO PAIN (0)

## 2024-03-21 NOTE — PROGRESS NOTES
"Patient's name and  were verified.  See Doc Flowsheet - IV assess for details.  IVAD accessed with 20G 3/4\" castaneda gripper plus needle  blood return positive: YES  Site without redness, tenderness or swelling: YES  flushed with 10cc NS and 5cc 100u/ml heparin  Needle: left in place for treatment  Comments: Labs drawn.  Patient tolerated procedure without incident.    "

## 2024-03-21 NOTE — LETTER
3/21/2024         RE: Berny Estrada  [address] [facility] [address] [address]  [facility] [address]        Dear Colleague,    Thank you for referring your patient, Berny Estrada, to the [facility]. Please see a copy of my visit note below.    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   Berny Estrada is a 73 year old male with medical history including right renal transplant (2014), hypertension, type 2 diabetes with proliferative diabetic retinopathy and long-term insulin use, CKD stage III, CAD, COPD, GERD, colon polyps (tubular adenoma), esophageal candidiasis, cholelithiasis, sleep apnea, hx of tobacco use (quit 50 years ago)-  Presenting complaint of weight loss 10/23-11/23 10-15 lbs, then gain 10 over past few months and Significant ECOG decline over the past 6 months,  using wheelchair and supplemental oxygen  -12/1/23- 12/9/23 hospitalized 2/2 anemia, hgb 7.9 w/melena x few months (was on iron)  - 12/6/23 CEA 11.69, 12/7/23 CEA 10  - 12/14/2023 EGD: Patchy white plaques in proximal esophagus and midesophagus suspicious for candidiasis, otherwise normal  -12/14/2023 [facility]:  - Fungating partially obstructing, partially circumferential (involving two thirds of the lumen circumference) large mass found in the cecum, including IC valve, with oozing   - Adenocarcinoma, moderately differentiated   - Loss of expression of MLH1 and PMS2; intact expression of MSH2 and MSH6   - MLH1 promoter methylation: POSITIVE   - BRAF V600 mutation positive  - 4 sessile polyps in ascending colon, 3-5 mm in size- - Sessile serrated adenoma with focal high-grade dysplasia and Separate minute fragment of adenocarcinoma  - 2 mm sessile polyp in transverse colon- Tubular adenoma; negative for high-grade dysplasia  - 2 sessile  polyps in the ascending colon, 4-5 mm in size- Tubular adenoma; negative for high-grade dysplasia  - 6 sessile polyps in the rectosigmoid colon, 3-8 mm in size- Sessile serrated adenoma with focal cytologic dysplasia. No evidence of high-grade dysplasia or invasive carcinoma  - Medium sized external hemorrhoids  12/21/23 CT CAP without contrast:  -Cardiomegaly, coronary artery stenting calcifications  - Dilated main pulmonary artery, 3.8 cm  - Calcified hilar lymphadenopathy  - RML and lingular bronchiectatic changes with associated fibroatelectasis, may be secondary to chronic infectious/inflammatory process, Ill-defined groundglass opacities predominantly affecting DERIC and lingula, Dependent atelectasis  - Small left-sided pleural effusion  -Atrophic thyroid with ill-defined subcentimeter nodularity  - Cholelithiasis  - Atrophic appearance of native kidneys, right lower quadrant renal transplant with mild hydronephrosis  - 1.6 cm hyperattenuating lesion of left kidney  - Ill-defined soft tissue thickening about cecum, likely corresponding to known cecal mass  - Associated thickening of peritoneal planes in the region  - Enlarged 2.1 x 1.7 cm mesenteric node adjacent to cecum  - Borderline enlarged 0.9 cm perirectal node  IMPRESSION:   1. Cecal mass with enlarged nodes of the right lower quadrant  including a 2.1 x 1.7 cm lymph node, which are suspicious for  metastatic disease. Indeterminate thickening and nodularity of the  peritoneal surfaces near the cecal mass could be due to fluid or  mesenteric spread of disease.  3. Indeterminate 1.6 cm lesion of the left native kidney. Consider  dedicated renal MRI with contrast for further characterization.  4. Right lower quadrant renal transplant with mild hydronephrosis.  5. Ill-defined groundglass opacities of the left upper lobe and  lingula suspicious for an infectious/inflammatory etiology. Additional  bronchiectatic and fibroatelectatic changes of the right middle  lobe  and lingula suggesting a chronic inflammatory process.  6. Small left-sided pleural effusion.  7. Dilated main pulmonary artery as can be seen with pulmonary  arterial hypertension.  8. Cholelithiasis without findings to suggest acute cholecystitis.    -12/21/23 CEA 31.3    Family history:  Brother- colorectal cancer, age 61, s/p surgery, receiving chemotherapy    Regarding kidney transplant  - right kidney transplant 2014  - on tacrolimus and prednisone 10 mg daily (off cellcept since 12/23)  - 12/23 Cr 1.6, GFR 45  Treatment:   3/6/2024 to start  infusional 5FU and LV    Interval History: Mr Estrada and wife are seen today for review for treatment for his cecal cancer with  infusional 5FU and LV. He has completed first infusion and shares he has had a tough couple weeks with weakness and fall in his bathroom at home causing him to his eye/head and go to hospital for short stay with right fracture of oribit, and into sinus-stitches needed  to eyelid on 3/8/2024. He reports he feels he has recuperated well but still has bruising of eyes, occasional headaches and is weak. Wife and daughter help to care for him at home. He is eating well and bowels are now back to normal. He is not taking medication for pain at present.   Taking Tacrolimus with history of kidney transplant- no current fevers or rashes.   Rest of comprehensive and complete ROS is reviewed and is negative.   Past Medical History:   Diagnosis Date     Diabetic retinopathy (H)      GERD (gastroesophageal reflux disease)      Hypertension      Renal disease     renal transplant     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     Sleep apnea     has equipment  not always uses     Type 2 diabetes mellitus without complications (H)      Current Outpatient Medications   Medication     albuterol (PROAIR HFA/PROVENTIL HFA/VENTOLIN HFA) 108 (90 Base) MCG/ACT inhaler     amLODIPine (NORVASC) 10 MG tablet     carvedilol (COREG) 12.5 MG tablet     insulin aspart U-10,  "diluted conc 10 units/mL, (NOVOLOG) 10 unit/ml injection     INSULIN GLARGINE SC     ipratropium - albuterol 0.5 mg/2.5 mg/3 mL (DUONEB) 0.5-2.5 (3) MG/3ML neb solution     lidocaine-prilocaine (EMLA) 2.5-2.5 % external cream     loratadine (CLARITIN) 10 MG tablet     Magnesium Oxide 420 MG TABS     mycophenolate, IDS 3865, (CELLCEPT) 250 MG capsule     OMEPRAZOLE PO     predniSONE (DELTASONE) 10 MG tablet     tacrolimus (PROGRAF-GENERIC EQUIVALENT) 5 MG capsule     tamsulosin (FLOMAX) 0.4 MG capsule     tiotropium-olodaterol 2.5-2.5 MCG/ACT AERS     VITAMIN D, CHOLECALCIFEROL, PO     dexAMETHasone (DECADRON) 4 MG tablet     dexAMETHasone (DECADRON) 4 MG tablet     ondansetron (ZOFRAN) 8 MG tablet     ondansetron (ZOFRAN) 8 MG tablet     prochlorperazine (COMPAZINE) 10 MG tablet     prochlorperazine (COMPAZINE) 10 MG tablet     No current facility-administered medications for this visit.     No Known Allergies    Physical Exam:BP (!) 163/81 (BP Location: Right arm, Patient Position: Chair, Cuff Size: Adult Large)   Pulse 76   Temp 97.8  F (36.6  C) (Oral)   Resp 16   Ht 1.778 m (5' 10\")   Wt 104.8 kg (231 lb)   SpO2 99%   BMI 33.15 kg/m     Constitutional: NAD, Alert, and in no distress.Using wheelchair today.    ENT: Eyes- bruising fading down from eyes. Right eye is still red. Has sutures still in eyelid- was told they were absorbable.  No mouth sores- speech clear.   Neck: Supple, No adenopathy.Normal ROM.   Cardiac: Heart rate and rhythm is regular with normal S1 and S2 without murmur  Respiratory: Non labored breathing. Lung sounds clear to auscultation bilaterally  Abdomen: Rounded, Soft, non-tender, BS normal. No masses or organomegaly  MS: Muscle tone fair, extremities normal with no edema.   Skin: No suspicious lesions or rashes  Neuro: Sensory grossly WNL, gait normal. A/O x 4.  Lymph: Normal ant/post cervical, axillary, supraclavicular nodes  Psych: Well groomed. Mentation appears normal and affect " normal/bright.    Laboratory/Imaging Results:   Results for orders placed or performed in visit on 03/21/24   Protein Immunofixation Serum     Status: None   Result Value Ref Range    Immunofixation ELP       No monoclonal protein seen on immunofixation. Pathologic significance requires clinical correlation.  Tiffanie Worley M.D., Ph.D.    Signout Location if Remote ABK1    Kappa and lambda light chain     Status: Abnormal   Result Value Ref Range    Kappa Free Light Chains 5.52 (H) 0.33 - 1.94 mg/dL    Lambda Free Light Chains 3.64 (H) 0.57 - 2.63 mg/dL    Kappa /Lambda Ratio 1.52 0.26 - 1.65   Comprehensive metabolic panel     Status: Abnormal   Result Value Ref Range    Sodium 137 135 - 145 mmol/L    Potassium 5.0 3.4 - 5.3 mmol/L    Carbon Dioxide (CO2) 28 22 - 29 mmol/L    Anion Gap 7 7 - 15 mmol/L    Urea Nitrogen 32.9 (H) 8.0 - 23.0 mg/dL    Creatinine 1.79 (H) 0.67 - 1.17 mg/dL    GFR Estimate 40 (L) >60 mL/min/1.73m2    Calcium 9.6 8.8 - 10.2 mg/dL    Chloride 102 98 - 107 mmol/L    Glucose 248 (H) 70 - 99 mg/dL    Alkaline Phosphatase 55 40 - 150 U/L    AST 13 0 - 45 U/L    ALT 10 0 - 70 U/L    Protein Total 6.3 (L) 6.4 - 8.3 g/dL    Albumin 3.5 3.5 - 5.2 g/dL    Bilirubin Total 0.4 <=1.2 mg/dL   Total Protein, Serum for ELP     Status: Abnormal   Result Value Ref Range    Total Protein Serum for ELP 6.0 (L) 6.4 - 8.3 g/dL   Protein Electrophoresis, Serum     Status: Abnormal   Result Value Ref Range    Albumin 3.3 (L) 3.7 - 5.1 g/dL    Alpha 1 0.3 0.2 - 0.4 g/dL    Alpha 2 0.7 0.5 - 0.9 g/dL    Beta Globulin 0.6 0.6 - 1.0 g/dL    Gamma Globulin 1.1 0.7 - 1.6 g/dL    Monoclonal Peak 0.0 <=0.0 g/dL    ELP Interpretation       Hypoalbuminemia with an otherwise essentially normal electrophoretic pattern. No monoclonal protein seen. Pathologic significance requires clinical correlation. Tiffanie Worley M.D., Ph.D.    Signout Location if Remote ABK1    CBC with platelets and differential     Status: Abnormal   Result  Value Ref Range    WBC Count 8.1 4.0 - 11.0 10e3/uL    RBC Count 3.40 (L) 4.40 - 5.90 10e6/uL    Hemoglobin 10.6 (L) 13.3 - 17.7 g/dL    Hematocrit 34.0 (L) 40.0 - 53.0 %     78 - 100 fL    MCH 31.2 26.5 - 33.0 pg    MCHC 31.2 (L) 31.5 - 36.5 g/dL    RDW 14.6 10.0 - 15.0 %    Platelet Count 148 (L) 150 - 450 10e3/uL    % Neutrophils 78 %    % Lymphocytes 15 %    % Monocytes 6 %    % Eosinophils 0 %    % Basophils 0 %    % Immature Granulocytes 1 %    NRBCs per 100 WBC 0 <1 /100    Absolute Neutrophils 6.3 1.6 - 8.3 10e3/uL    Absolute Lymphocytes 1.2 0.8 - 5.3 10e3/uL    Absolute Monocytes 0.5 0.0 - 1.3 10e3/uL    Absolute Eosinophils 0.0 0.0 - 0.7 10e3/uL    Absolute Basophils 0.0 0.0 - 0.2 10e3/uL    Absolute Immature Granulocytes 0.1 <=0.4 10e3/uL    Absolute NRBCs 0.0 10e3/uL   Protein electrophoresis     Status: Abnormal    Narrative    The following orders were created for panel order Protein electrophoresis.  Procedure                               Abnormality         Status                     ---------                               -----------         ------                     Total Protein, Serum for...[[id number]]  Abnormal            Final result               Protein Electrophoresis,...[[id number]]  Abnormal            Final result                 Please view results for these tests on the individual orders.   CBC with platelets differential     Status: Abnormal    Narrative    The following orders were created for panel order CBC with platelets differential.  Procedure                               Abnormality         Status                     ---------                               -----------         ------                     CBC with platelets and d...[[id number]]  Abnormal            Final result                 Please view results for these tests on the individual orders.       Assessment and Plan:   Cecal Adenocarcinoma- Pt started Leucovorin and infusional 5FU on 3/6/2024.  He has noted  some loose stools in first days and noted weakness.   - labs reviewed with stable CKD stage 3a disease.   - blood levels are adequate for treatment though anemia and very mild thrombocytopenia.   - blood sugars elevated but has known diabetes Type 2.   - [facility].   Plan signed for treatment today without changes.   Will see provider prior to each cycle for now.   Fall on 3/8/2-24 with orbital and maxillary sinus fractures- Pt has bruising and some pain from fall and needs to ophthalmologist but states they have this appt for near future.Sutures still noted in Right eye lid need review- now 12 days out- encouraged pt to follow up as instructed for eye doctor and ENT. Discussed need for PT  but said they are already coming from past referral   Kidney transplant- pt using tacrolimus and working closely with transplant team- aware of his cancer diagnosis. Using amlodipine and carvedilol for help with BPs  DM Type 2 -  blood sugars are not well controlled. [facility].. Shared that BSs may increase with steroids given with plan.   The total time of this encounter amounted to  40 minutes. This time included face to face time spent with the patient, prep work, ordering tests, and performing post visit documentation.  Anna Bonilla Cnp       Again, thank you for allowing me to participate in the care of your patient.        Sincerely,        Anna Bonilla, AURA, APRN CNP

## 2024-03-21 NOTE — PROGRESS NOTES
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   Berny Estrada is a 73 year old male with medical history including right renal transplant (2014), hypertension, type 2 diabetes with proliferative diabetic retinopathy and long-term insulin use, CKD stage III, CAD, COPD, GERD, colon polyps (tubular adenoma), esophageal candidiasis, cholelithiasis, sleep apnea, hx of tobacco use (quit 50 years ago)-  Presenting complaint of weight loss 10/23-11/23 10-15 lbs, then gain 10 over past few months and Significant ECOG decline over the past 6 months,  using wheelchair and supplemental oxygen  -12/1/23- 12/9/23 hospitalized 2/2 anemia, hgb 7.9 w/melena x few months (was on iron)  - 12/6/23 CEA 11.69, 12/7/23 CEA 10  - 12/14/2023 EGD: Patchy white plaques in proximal esophagus and midesophagus suspicious for candidiasis, otherwise normal  -12/14/2023 [facility]:  - Fungating partially obstructing, partially circumferential (involving two thirds of the lumen circumference) large mass found in the cecum, including IC valve, with oozing   - Adenocarcinoma, moderately differentiated   - Loss of expression of MLH1 and PMS2; intact expression of MSH2 and MSH6   - MLH1 promoter methylation: POSITIVE   - BRAF V600 mutation positive  - 4 sessile polyps in ascending colon, 3-5 mm in size- - Sessile serrated adenoma with focal high-grade dysplasia and Separate minute fragment of adenocarcinoma  - 2 mm sessile polyp in transverse colon- Tubular adenoma; negative for high-grade dysplasia  - 2 sessile polyps in the ascending colon, 4-5 mm in size- Tubular adenoma; negative for high-grade dysplasia  - 6 sessile polyps in the rectosigmoid colon, 3-8 mm in size- Sessile serrated adenoma with focal cytologic dysplasia. No evidence of high-grade dysplasia or invasive carcinoma  -  Medium sized external hemorrhoids  12/21/23 CT CAP without contrast:  -Cardiomegaly, coronary artery stenting calcifications  - Dilated main pulmonary artery, 3.8 cm  - Calcified hilar lymphadenopathy  - RML and lingular bronchiectatic changes with associated fibroatelectasis, may be secondary to chronic infectious/inflammatory process, Ill-defined groundglass opacities predominantly affecting DERIC and lingula, Dependent atelectasis  - Small left-sided pleural effusion  -Atrophic thyroid with ill-defined subcentimeter nodularity  - Cholelithiasis  - Atrophic appearance of native kidneys, right lower quadrant renal transplant with mild hydronephrosis  - 1.6 cm hyperattenuating lesion of left kidney  - Ill-defined soft tissue thickening about cecum, likely corresponding to known cecal mass  - Associated thickening of peritoneal planes in the region  - Enlarged 2.1 x 1.7 cm mesenteric node adjacent to cecum  - Borderline enlarged 0.9 cm perirectal node  IMPRESSION:   1. Cecal mass with enlarged nodes of the right lower quadrant  including a 2.1 x 1.7 cm lymph node, which are suspicious for  metastatic disease. Indeterminate thickening and nodularity of the  peritoneal surfaces near the cecal mass could be due to fluid or  mesenteric spread of disease.  3. Indeterminate 1.6 cm lesion of the left native kidney. Consider  dedicated renal MRI with contrast for further characterization.  4. Right lower quadrant renal transplant with mild hydronephrosis.  5. Ill-defined groundglass opacities of the left upper lobe and  lingula suspicious for an infectious/inflammatory etiology. Additional  bronchiectatic and fibroatelectatic changes of the right middle lobe  and lingula suggesting a chronic inflammatory process.  6. Small left-sided pleural effusion.  7. Dilated main pulmonary artery as can be seen with pulmonary  arterial hypertension.  8. Cholelithiasis without findings to suggest acute cholecystitis.    -12/21/23 CEA  31.3    Family history:  Brother- colorectal cancer, age 61, s/p surgery, receiving chemotherapy    Regarding kidney transplant  - right kidney transplant 2014  - on tacrolimus and prednisone 10 mg daily (off cellcept since 12/23)  - 12/23 Cr 1.6, GFR 45  Treatment:   3/6/2024 to start  infusional 5FU and LV    Interval History: Mr Estrada and wife are seen today for review for treatment for his cecal cancer with  infusional 5FU and LV. He has completed first infusion and shares he has had a tough couple weeks with weakness and fall in his bathroom at home causing him to his eye/head and go to hospital for short stay with right fracture of oribit, and into sinus-stitches needed  to eyelid on 3/8/2024. He reports he feels he has recuperated well but still has bruising of eyes, occasional headaches and is weak. Wife and daughter help to care for him at home. He is eating well and bowels are now back to normal. He is not taking medication for pain at present.   Taking Tacrolimus with history of kidney transplant- no current fevers or rashes.   Rest of comprehensive and complete ROS is reviewed and is negative.   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    Sleep apnea     has equipment  not always uses    Type 2 diabetes mellitus without complications (H)      Current Outpatient Medications   Medication    albuterol (PROAIR HFA/PROVENTIL HFA/VENTOLIN HFA) 108 (90 Base) MCG/ACT inhaler    amLODIPine (NORVASC) 10 MG tablet    carvedilol (COREG) 12.5 MG tablet    insulin aspart U-10, diluted conc 10 units/mL, (NOVOLOG) 10 unit/ml injection    INSULIN GLARGINE SC    ipratropium - albuterol 0.5 mg/2.5 mg/3 mL (DUONEB) 0.5-2.5 (3) MG/3ML neb solution    lidocaine-prilocaine (EMLA) 2.5-2.5 % external cream    loratadine (CLARITIN) 10 MG tablet    Magnesium Oxide 420  "MG TABS    mycophenolate, IDS 3865, (CELLCEPT) 250 MG capsule    OMEPRAZOLE PO    predniSONE (DELTASONE) 10 MG tablet    tacrolimus (PROGRAF-GENERIC EQUIVALENT) 5 MG capsule    tamsulosin (FLOMAX) 0.4 MG capsule    tiotropium-olodaterol 2.5-2.5 MCG/ACT AERS    VITAMIN D, CHOLECALCIFEROL, PO    dexAMETHasone (DECADRON) 4 MG tablet    dexAMETHasone (DECADRON) 4 MG tablet    ondansetron (ZOFRAN) 8 MG tablet    ondansetron (ZOFRAN) 8 MG tablet    prochlorperazine (COMPAZINE) 10 MG tablet    prochlorperazine (COMPAZINE) 10 MG tablet     No current facility-administered medications for this visit.     No Known Allergies    Physical Exam:BP (!) 163/81 (BP Location: Right arm, Patient Position: Chair, Cuff Size: Adult Large)   Pulse 76   Temp 97.8  F (36.6  C) (Oral)   Resp 16   Ht 1.778 m (5' 10\")   Wt 104.8 kg (231 lb)   SpO2 99%   BMI 33.15 kg/m     Constitutional: NAD, Alert, and in no distress.Using wheelchair today.    ENT: Eyes- bruising fading down from eyes. Right eye is still red. Has sutures still in eyelid- was told they were absorbable.  No mouth sores- speech clear.   Neck: Supple, No adenopathy.Normal ROM.   Cardiac: Heart rate and rhythm is regular with normal S1 and S2 without murmur  Respiratory: Non labored breathing. Lung sounds clear to auscultation bilaterally  Abdomen: Rounded, Soft, non-tender, BS normal. No masses or organomegaly  MS: Muscle tone fair, extremities normal with no edema.   Skin: No suspicious lesions or rashes  Neuro: Sensory grossly WNL, gait normal. A/O x 4.  Lymph: Normal ant/post cervical, axillary, supraclavicular nodes  Psych: Well groomed. Mentation appears normal and affect normal/bright.    Laboratory/Imaging Results:   Results for orders placed or performed in visit on 03/21/24   Protein Immunofixation Serum     Status: None   Result Value Ref Range    Immunofixation ELP       No monoclonal protein seen on immunofixation. Pathologic significance requires clinical " correlation.  Tiffanie Worley M.D., Ph.D.    Signout Location if Remote ABK1    Kappa and lambda light chain     Status: Abnormal   Result Value Ref Range    Kappa Free Light Chains 5.52 (H) 0.33 - 1.94 mg/dL    Lambda Free Light Chains 3.64 (H) 0.57 - 2.63 mg/dL    Kappa /Lambda Ratio 1.52 0.26 - 1.65   Comprehensive metabolic panel     Status: Abnormal   Result Value Ref Range    Sodium 137 135 - 145 mmol/L    Potassium 5.0 3.4 - 5.3 mmol/L    Carbon Dioxide (CO2) 28 22 - 29 mmol/L    Anion Gap 7 7 - 15 mmol/L    Urea Nitrogen 32.9 (H) 8.0 - 23.0 mg/dL    Creatinine 1.79 (H) 0.67 - 1.17 mg/dL    GFR Estimate 40 (L) >60 mL/min/1.73m2    Calcium 9.6 8.8 - 10.2 mg/dL    Chloride 102 98 - 107 mmol/L    Glucose 248 (H) 70 - 99 mg/dL    Alkaline Phosphatase 55 40 - 150 U/L    AST 13 0 - 45 U/L    ALT 10 0 - 70 U/L    Protein Total 6.3 (L) 6.4 - 8.3 g/dL    Albumin 3.5 3.5 - 5.2 g/dL    Bilirubin Total 0.4 <=1.2 mg/dL   Total Protein, Serum for ELP     Status: Abnormal   Result Value Ref Range    Total Protein Serum for ELP 6.0 (L) 6.4 - 8.3 g/dL   Protein Electrophoresis, Serum     Status: Abnormal   Result Value Ref Range    Albumin 3.3 (L) 3.7 - 5.1 g/dL    Alpha 1 0.3 0.2 - 0.4 g/dL    Alpha 2 0.7 0.5 - 0.9 g/dL    Beta Globulin 0.6 0.6 - 1.0 g/dL    Gamma Globulin 1.1 0.7 - 1.6 g/dL    Monoclonal Peak 0.0 <=0.0 g/dL    ELP Interpretation       Hypoalbuminemia with an otherwise essentially normal electrophoretic pattern. No monoclonal protein seen. Pathologic significance requires clinical correlation. Tiffanie Worley M.D., Ph.D.    Signout Location if Remote ABK1    CBC with platelets and differential     Status: Abnormal   Result Value Ref Range    WBC Count 8.1 4.0 - 11.0 10e3/uL    RBC Count 3.40 (L) 4.40 - 5.90 10e6/uL    Hemoglobin 10.6 (L) 13.3 - 17.7 g/dL    Hematocrit 34.0 (L) 40.0 - 53.0 %     78 - 100 fL    MCH 31.2 26.5 - 33.0 pg    MCHC 31.2 (L) 31.5 - 36.5 g/dL    RDW 14.6 10.0 - 15.0 %    Platelet Count  148 (L) 150 - 450 10e3/uL    % Neutrophils 78 %    % Lymphocytes 15 %    % Monocytes 6 %    % Eosinophils 0 %    % Basophils 0 %    % Immature Granulocytes 1 %    NRBCs per 100 WBC 0 <1 /100    Absolute Neutrophils 6.3 1.6 - 8.3 10e3/uL    Absolute Lymphocytes 1.2 0.8 - 5.3 10e3/uL    Absolute Monocytes 0.5 0.0 - 1.3 10e3/uL    Absolute Eosinophils 0.0 0.0 - 0.7 10e3/uL    Absolute Basophils 0.0 0.0 - 0.2 10e3/uL    Absolute Immature Granulocytes 0.1 <=0.4 10e3/uL    Absolute NRBCs 0.0 10e3/uL   Protein electrophoresis     Status: Abnormal    Narrative    The following orders were created for panel order Protein electrophoresis.  Procedure                               Abnormality         Status                     ---------                               -----------         ------                     Total Protein, Serum for...[[id number]]  Abnormal            Final result               Protein Electrophoresis,...[[id number]]  Abnormal            Final result                 Please view results for these tests on the individual orders.   CBC with platelets differential     Status: Abnormal    Narrative    The following orders were created for panel order CBC with platelets differential.  Procedure                               Abnormality         Status                     ---------                               -----------         ------                     CBC with platelets and d...[[id number]]  Abnormal            Final result                 Please view results for these tests on the individual orders.       Assessment and Plan:   Cecal Adenocarcinoma- Pt started Leucovorin and infusional 5FU on 3/6/2024.  He has noted some loose stools in first days and noted weakness.   - labs reviewed with stable CKD stage 3a disease.   - blood levels are adequate for treatment though anemia and very mild thrombocytopenia.   - blood sugars elevated but has known diabetes Type 2.   [facility]   Plan  signed for treatment today without changes.   Will see provider prior to each cycle for now.   Fall on 3/8/2-24 with orbital and maxillary sinus fractures- Pt has bruising and some pain from fall and needs to ophthalmologist but states they have this appt for near future.Sutures still noted in Right eye lid need review- now 12 days out- encouraged pt to follow up as instructed for eye doctor and ENT. Discussed need for PT  but said they are already coming from past referral   Kidney transplant- pt using tacrolimus and working closely with transplant team- aware of his cancer diagnosis. Using amlodipine and carvedilol for help with BPs  DM Type 2 -  blood sugars are not well controlled. [facility].. Shared that BSs may increase with steroids given with plan.   The total time of this encounter amounted to  40 minutes. This time included face to face time spent with the patient, prep work, ordering tests, and performing post visit documentation.  Anna Bonilla,Cnp

## 2024-03-21 NOTE — PROGRESS NOTES
"Infusion Nursing Note:  Berny Estrada presents today for C1 D1 Leucovorin + 5fu pump.    Patient seen by provider today: Yes: Anna Bonilla NP   present during visit today: Not Applicable.    Note: Patient new to [place] today. Reviewed procedures and routines along with chemotherapy and possible side effects.   Patient eager to start but upset that [facility].     Zara the pharmacist spent time with patient and his wife discussing the take home medications.  The decision was made to pick them up in our pharmacy today on their way out.  Wife stated [facility], but they are unsure of how quickly they could get the meds today.       New patient teaching done today and reviewed important phone numbers for patient to have at home.  Provided RNCC contact information for other questions. Patient and wife verbalized understanding.     Intravenous Access  Implanted Port.    Treatment Conditions:  Lab Results   Component Value Date    HGB 10.6 (L) 03/21/2024    WBC 8.1 03/21/2024    ANEUTAUTO 6.3 03/21/2024     (L) 03/21/2024        Lab Results   Component Value Date     03/21/2024    POTASSIUM 5.0 03/21/2024    CR 1.79 (H) 03/21/2024    VIVI 9.6 03/21/2024    BILITOTAL 0.4 03/21/2024    ALBUMIN 3.5 03/21/2024    ALT 10 03/21/2024    AST 13 03/21/2024       Results reviewed, labs MET treatment parameters, ok to proceed with treatment.      Post Infusion Assessment:  Patient tolerated infusion without incident.  Blood return noted pre and post infusion.  Site patent and intact, free from redness, edema or discomfort.       Prior to discharge: Port is secured in place with tegaderm and flushed with 10cc NS with positive blood return noted.    Continuous home infusion Dosi-Fuser pump connected.    All connectors secured in place and clamps taped open.    Pump started, \"running\" noted on display (CADD): Not Applicable.   Capillary element taped to " pt's skin per protocol.  Pump Connection double checked with Janet MATTHEW.  Patient instructed to call our clinic or [facility] with any questions or concerns at home.  Patient verbalized understanding.    Patient set up for pump disconnect at home with [facility] on Saturday 3/23 @ 08:30 am. Verified disconnect with [facility].        Discharge Plan:   Discharge instructions reviewed with: Patient and wife.  Patient and/or family verbalized understanding of discharge instructions and all questions answered.  Patient discharged in stable condition accompanied by: wife.  Departure Mode: Wheelchair.      Mckenzie Giang RN

## 2024-03-21 NOTE — NURSING NOTE
"Oncology Rooming Note    March 21, 2024 7:57 AM   Berny Estrada is a 73 year old male who presents for:    Chief Complaint   Patient presents with    Oncology Clinic Visit     Prior to tx     Initial Vitals: BP (!) 163/81 (BP Location: Right arm, Patient Position: Chair, Cuff Size: Adult Large)   Pulse 76   Temp 97.8  F (36.6  C) (Oral)   Resp 16   Ht 1.778 m (5' 10\")   Wt 104.8 kg (231 lb)   SpO2 99%   BMI 33.15 kg/m   Estimated body mass index is 33.15 kg/m  as calculated from the following:    Height as of this encounter: 1.778 m (5' 10\").    Weight as of this encounter: 104.8 kg (231 lb). Body surface area is 2.28 meters squared.  No Pain (0) Comment: Data Unavailable   No LMP for male patient.  Allergies reviewed: Yes  Medications reviewed: Yes    Medications: Medication refills not needed today.  Pharmacy name entered into EPIC: [facility] - [place], [place] - [facility]    Frailty Screening:   Is the patient here for a new oncology consult visit in cancer care? 2. No      Clinical concerns: NO       Sandra Hansen CMA              "

## 2024-03-22 LAB
ALBUMIN SERPL ELPH-MCNC: 3.3 G/DL (ref 3.7–5.1)
ALPHA1 GLOB SERPL ELPH-MCNC: 0.3 G/DL (ref 0.2–0.4)
ALPHA2 GLOB SERPL ELPH-MCNC: 0.7 G/DL (ref 0.5–0.9)
B-GLOBULIN SERPL ELPH-MCNC: 0.6 G/DL (ref 0.6–1)
GAMMA GLOB SERPL ELPH-MCNC: 1.1 G/DL (ref 0.7–1.6)
KAPPA LC FREE SER-MCNC: 5.52 MG/DL (ref 0.33–1.94)
KAPPA LC FREE/LAMBDA FREE SER NEPH: 1.52 {RATIO} (ref 0.26–1.65)
LAMBDA LC FREE SERPL-MCNC: 3.64 MG/DL (ref 0.57–2.63)
LOCATION OF TASK: ABNORMAL
LOCATION OF TASK: NORMAL
M PROTEIN SERPL ELPH-MCNC: 0 G/DL
PROT PATTERN SERPL ELPH-IMP: ABNORMAL
PROT PATTERN SERPL IFE-IMP: NORMAL

## 2024-03-26 ENCOUNTER — PATIENT OUTREACH (OUTPATIENT)
Dept: ONCOLOGY | Facility: CLINIC | Age: 74
End: 2024-03-26
Payer: MEDICARE

## 2024-03-26 NOTE — PROGRESS NOTES
[facility]: Cancer Care Follow-Up Note                                    Discussion with Patient:                                                      Spoke with patient and wife for c1d1 follow-up. They report patient is feeling well and other than some mild appetite loss, they deny pain nausea vomiting diarrhea etc. Patient is pushing food and fluids as much as possible. Other symptoms negative as documented below.    Patient to follow-up as scheduled. Reviewed appointments and contact numbers.    Katarzyna Deal, RN, BSN  RN Care Coordinator - Oncology/Hematology  [facility]       Dates of Treatment:                                                      Infusion given in last 28 days       Administered MAR Action Medication Dose Rate Visit    03/21/2024 09:42 New Bag leucovorin calcium 800 mg in D5W 150 mL infusion 800 mg 600 mL/hr Infusion Therapy Visit on 03/21/2024 in [facility]    03/21/2024 10:16 Given fluorouracil (ADRUCIL) 5,375 mg in sodium chloride 0.9 % 241 mL 46 hr HOME Infusion (via C-Series) 5,375 mg 5.2 mL/hr Infusion Therapy Visit on 03/21/2024 in [facility]

## 2024-03-27 LAB — HBA1C MFR BLD: 8.5 % (ref 4–6)

## 2024-03-27 PROCEDURE — 80197 ASSAY OF TACROLIMUS: CPT

## 2024-03-28 ENCOUNTER — LAB REQUISITION (OUTPATIENT)
Dept: LAB | Facility: CLINIC | Age: 74
End: 2024-03-28

## 2024-03-28 LAB
TACROLIMUS BLD-MCNC: <1 UG/L (ref 5–15)
TME LAST DOSE: ABNORMAL H
TME LAST DOSE: ABNORMAL H

## 2024-04-04 ENCOUNTER — INFUSION THERAPY VISIT (OUTPATIENT)
Dept: INFUSION THERAPY | Facility: CLINIC | Age: 74
End: 2024-04-04
Attending: NURSE PRACTITIONER
Payer: MEDICARE

## 2024-04-04 ENCOUNTER — VIRTUAL VISIT (OUTPATIENT)
Dept: ONCOLOGY | Facility: CLINIC | Age: 74
End: 2024-04-04
Attending: NURSE PRACTITIONER
Payer: MEDICARE

## 2024-04-04 VITALS — HEIGHT: 70 IN | WEIGHT: 231 LBS | BODY MASS INDEX: 33.07 KG/M2

## 2024-04-04 VITALS
DIASTOLIC BLOOD PRESSURE: 83 MMHG | HEART RATE: 86 BPM | SYSTOLIC BLOOD PRESSURE: 153 MMHG | BODY MASS INDEX: 33.58 KG/M2 | WEIGHT: 234 LBS | RESPIRATION RATE: 18 BRPM | TEMPERATURE: 98.6 F | OXYGEN SATURATION: 96 %

## 2024-04-04 DIAGNOSIS — C18.0 MALIGNANT NEOPLASM OF CECUM (H): Primary | ICD-10-CM

## 2024-04-04 LAB
ALBUMIN SERPL BCG-MCNC: 3.3 G/DL (ref 3.5–5.2)
ALP SERPL-CCNC: 73 U/L (ref 40–150)
ALT SERPL W P-5'-P-CCNC: 24 U/L (ref 0–70)
ANION GAP SERPL CALCULATED.3IONS-SCNC: 8 MMOL/L (ref 7–15)
AST SERPL W P-5'-P-CCNC: 16 U/L (ref 0–45)
BASOPHILS # BLD AUTO: 0 10E3/UL (ref 0–0.2)
BASOPHILS NFR BLD AUTO: 0 %
BILIRUB SERPL-MCNC: 0.3 MG/DL
BUN SERPL-MCNC: 34.4 MG/DL (ref 8–23)
CALCIUM SERPL-MCNC: 9.3 MG/DL (ref 8.8–10.2)
CHLORIDE SERPL-SCNC: 105 MMOL/L (ref 98–107)
CREAT SERPL-MCNC: 1.6 MG/DL (ref 0.67–1.17)
DEPRECATED HCO3 PLAS-SCNC: 27 MMOL/L (ref 22–29)
EGFRCR SERPLBLD CKD-EPI 2021: 45 ML/MIN/1.73M2
EOSINOPHIL # BLD AUTO: 0.1 10E3/UL (ref 0–0.7)
EOSINOPHIL NFR BLD AUTO: 1 %
ERYTHROCYTE [DISTWIDTH] IN BLOOD BY AUTOMATED COUNT: 15.1 % (ref 10–15)
GLUCOSE SERPL-MCNC: 269 MG/DL (ref 70–99)
HCT VFR BLD AUTO: 32.9 % (ref 40–53)
HGB BLD-MCNC: 10.3 G/DL (ref 13.3–17.7)
IMM GRANULOCYTES # BLD: 0 10E3/UL
IMM GRANULOCYTES NFR BLD: 1 %
LYMPHOCYTES # BLD AUTO: 1.2 10E3/UL (ref 0.8–5.3)
LYMPHOCYTES NFR BLD AUTO: 22 %
MCH RBC QN AUTO: 31.3 PG (ref 26.5–33)
MCHC RBC AUTO-ENTMCNC: 31.3 G/DL (ref 31.5–36.5)
MCV RBC AUTO: 100 FL (ref 78–100)
MONOCYTES # BLD AUTO: 0.3 10E3/UL (ref 0–1.3)
MONOCYTES NFR BLD AUTO: 6 %
NEUTROPHILS # BLD AUTO: 3.7 10E3/UL (ref 1.6–8.3)
NEUTROPHILS NFR BLD AUTO: 70 %
NRBC # BLD AUTO: 0 10E3/UL
NRBC BLD AUTO-RTO: 0 /100
PLATELET # BLD AUTO: 101 10E3/UL (ref 150–450)
POTASSIUM SERPL-SCNC: 4.6 MMOL/L (ref 3.4–5.3)
PROT SERPL-MCNC: 6 G/DL (ref 6.4–8.3)
RBC # BLD AUTO: 3.29 10E6/UL (ref 4.4–5.9)
SODIUM SERPL-SCNC: 140 MMOL/L (ref 135–145)
WBC # BLD AUTO: 5.2 10E3/UL (ref 4–11)

## 2024-04-04 PROCEDURE — 96365 THER/PROPH/DIAG IV INF INIT: CPT

## 2024-04-04 PROCEDURE — 36591 DRAW BLOOD OFF VENOUS DEVICE: CPT | Performed by: INTERNAL MEDICINE

## 2024-04-04 PROCEDURE — 250N000011 HC RX IP 250 OP 636: Performed by: NURSE PRACTITIONER

## 2024-04-04 PROCEDURE — 85025 COMPLETE CBC W/AUTO DIFF WBC: CPT | Performed by: INTERNAL MEDICINE

## 2024-04-04 PROCEDURE — 250N000011 HC RX IP 250 OP 636: Performed by: INTERNAL MEDICINE

## 2024-04-04 PROCEDURE — G2211 COMPLEX E/M VISIT ADD ON: HCPCS | Mod: 95 | Performed by: NURSE PRACTITIONER

## 2024-04-04 PROCEDURE — 258N000003 HC RX IP 258 OP 636: Performed by: INTERNAL MEDICINE

## 2024-04-04 PROCEDURE — 80053 COMPREHEN METABOLIC PANEL: CPT | Performed by: INTERNAL MEDICINE

## 2024-04-04 PROCEDURE — 99207 PR NO CHARGE LOS: CPT

## 2024-04-04 PROCEDURE — 99214 OFFICE O/P EST MOD 30 MIN: CPT | Mod: 95 | Performed by: NURSE PRACTITIONER

## 2024-04-04 PROCEDURE — G0498 CHEMO EXTEND IV INFUS W/PUMP: HCPCS

## 2024-04-04 PROCEDURE — 96375 TX/PRO/DX INJ NEW DRUG ADDON: CPT

## 2024-04-04 RX ORDER — HEPARIN SODIUM (PORCINE) LOCK FLUSH IV SOLN 100 UNIT/ML 100 UNIT/ML
5 SOLUTION INTRAVENOUS
Status: DISCONTINUED | OUTPATIENT
Start: 2024-04-04 | End: 2024-04-04 | Stop reason: HOSPADM

## 2024-04-04 RX ORDER — HEPARIN SODIUM (PORCINE) LOCK FLUSH IV SOLN 100 UNIT/ML 100 UNIT/ML
500 SOLUTION INTRAVENOUS ONCE
Status: COMPLETED | OUTPATIENT
Start: 2024-04-04 | End: 2024-04-04

## 2024-04-04 RX ORDER — ONDANSETRON 2 MG/ML
8 INJECTION INTRAMUSCULAR; INTRAVENOUS ONCE
Status: COMPLETED | OUTPATIENT
Start: 2024-04-04 | End: 2024-04-04

## 2024-04-04 RX ADMIN — ONDANSETRON 8 MG: 2 INJECTION INTRAMUSCULAR; INTRAVENOUS at 12:29

## 2024-04-04 RX ADMIN — Medication 500 UNITS: at 11:45

## 2024-04-04 RX ADMIN — LEUCOVORIN CALCIUM 800 MG: 500 INJECTION, POWDER, LYOPHILIZED, FOR SOLUTION INTRAMUSCULAR; INTRAVENOUS at 12:34

## 2024-04-04 RX ADMIN — DEXTROSE MONOHYDRATE 100 ML: 5 INJECTION INTRAVENOUS at 12:24

## 2024-04-04 ASSESSMENT — PAIN SCALES - GENERAL: PAINLEVEL: NO PAIN (0)

## 2024-04-04 NOTE — NURSING NOTE
Is the patient currently in the state of [place]? YES    Visit mode:VIDEO    If the visit is dropped, the patient can be reconnected by: VIDEO VISIT: Send to e-mail at: [email or web address]    Will anyone else be joining the visit? YES: How would they like to receive their invitation? Send to e-mail:    (If patient encounters technical issues they should call [phone]    How would you like to obtain your AVS? MyChart    Are changes needed to the allergy or medication list? No      Reason for visit: Video Visit (Follow Up )    Kristy PALMA

## 2024-04-04 NOTE — PROGRESS NOTES
Port accessed with no incident. Blood return noted. Port flushed with saline and heparin. Patient tolerated port flush well. Port was kept accessed for infusion to use. Please refer to flow sheets for more information.  Janee Ray RN on 4/4/2024 at 12:03 PM

## 2024-04-04 NOTE — PROGRESS NOTES
Virtual Visit Details    Type of service:  Video Visit   Video Start Time:  1005  Video End Time:1026    Originating Location (pt. Location): Home    Distant Location (provider location):  Off-site  Platform used for Video Visit: Mario    Reason for Visit: seen in follow-up of cecal  adenocarcinoma    Oncology HPI:     Berny Estrada is a 73 year old male with medical history including right renal transplant (2014), hypertension, type 2 diabetes with proliferative diabetic retinopathy and long-term insulin use, CKD stage III, CAD, COPD, GERD, colon polyps (tubular adenoma), esophageal candidiasis, cholelithiasis, sleep apnea, hx of tobacco use (quit 50 years ago)-        Presenting complaint of weight loss 10/23-11/23 10-15 lbs, then gain 10 over past few months and Significant ECOG decline over the past 6 months,  using wheelchair and supplemental oxygen  -12/1/23- 12/9/23 hospitalized 2/2 anemia, hgb 7.9 w/melena x few months (was on iron)  - 12/6/23 CEA 11.69, 12/7/23 CEA 10  - 12/14/2023 EGD: Patchy white plaques in proximal esophagus and midesophagus suspicious for candidiasis, otherwise normal  -12/14/2023 [facility]:  - Fungating partially obstructing, partially circumferential (involving two thirds of the lumen circumference) large mass found in the cecum, including IC valve, with oozing              - Adenocarcinoma, moderately differentiated         - Loss of expression of MLH1 and PMS2; intact expression of MSH2 and MSH6   - MLH1 promoter methylation: POSITIVE   - BRAF V600 mutation positive  - 4 sessile polyps in ascending colon, 3-5 mm in size- - Sessile serrated adenoma with focal high-grade dysplasia and Separate minute fragment of adenocarcinoma  - 2 mm sessile polyp in transverse colon- Tubular adenoma; negative for high-grade dysplasia  - 2 sessile polyps in the ascending colon, 4-5 mm in size- Tubular adenoma; negative for high-grade  dysplasia  - 6 sessile polyps in the rectosigmoid colon, 3-8 mm in size- Sessile serrated adenoma with focal cytologic dysplasia. No evidence of high-grade dysplasia or invasive carcinoma    He was recommended to start systemic therapy with 5FU and leucovorin    Prior to starting chemotherapy he tripped and fell and had complications of a Right orbital fracture.      He went on to receive cycle 1 on 3/21/24.    Interval history:   Berny is seen for evaluation prior to cycle 2. He notes little side effect of chemotherapy. No nausea or vomiting. No diarrhea. No mouth soreness or hand/foot tenderness. Energy has been about the same as baseline. Walking is limited by a prior ankle injury. Only able to walk a few feet unassisted.  Lower extremity edema is about the same in the past few weeks, worse in the last few months. Has had ultrasounds to evaluate for DVT that were negative ([facility]).    -Since starting chemotherapy, his glucoses have been hard to regulate. He has been hyperglycemic, requiring additional insulin, but then has been waking to alarms of hypoglycemia. His monitor woke him last night with a glucose of 52.  He is eating ok, but notes that he didn't eat much after taking the extra insulin last night.    -no cough, shortness of breath, chest pain.    Current Outpatient Medications   Medication Sig Dispense Refill    albuterol (PROAIR HFA/PROVENTIL HFA/VENTOLIN HFA) 108 (90 Base) MCG/ACT inhaler INHALE 2 PUFFS BY INHALATION EVERY 6 HOURS AS NEEDED FOR SHORTNESS OF BREATH, FOR SECRETIONS      amLODIPine (NORVASC) 10 MG tablet Take 10 mg by mouth daily      carvedilol (COREG) 12.5 MG tablet Take 12.5 mg by mouth 2 times daily      dexAMETHasone (DECADRON) 4 MG tablet Take 2 tablets (8 mg) by mouth daily Take for 2 days, starting the day after chemo. Take with food. 4 tablet 2    dexAMETHasone (DECADRON) 4 MG tablet Take 2 tablets (8 mg) by mouth daily Take for 2 days, starting the day after chemo. Take  with food. 4 tablet 2    insulin aspart U-10, diluted conc 10 units/mL, (NOVOLOG) 10 unit/ml injection Inject Subcutaneous 3 times daily (before meals) 10-16 units      INSULIN GLARGINE SC Inject 34 Units Subcutaneous at bedtime 7/11 am      ipratropium - albuterol 0.5 mg/2.5 mg/3 mL (DUONEB) 0.5-2.5 (3) MG/3ML neb solution INHALE 3ML IN NEBULIZER BY INHALATION FOUR TIMES A DAY AS NEEDED FOR SHORTNESS OF BREATH      lidocaine-prilocaine (EMLA) 2.5-2.5 % external cream Apply topically as needed for moderate pain 30 g 1    loratadine (CLARITIN) 10 MG tablet 10 mg      Magnesium Oxide 420 MG TABS Take 1 tablet by mouth 2 times daily      mycophenolate, IDS 3865, (CELLCEPT) 250 MG capsule Take 2 tablets by mouth 2 times daily      OMEPRAZOLE PO Take 1 tablet by mouth daily      ondansetron (ZOFRAN) 8 MG tablet Take 1 tablet (8 mg) by mouth every 8 hours as needed for nausea (vomiting) 30 tablet 2    ondansetron (ZOFRAN) 8 MG tablet Take 1 tablet (8 mg) by mouth every 8 hours as needed for nausea (vomiting) 30 tablet 2    predniSONE (DELTASONE) 10 MG tablet Take 40 mg by mouth daily      prochlorperazine (COMPAZINE) 10 MG tablet Take 1 tablet (10 mg) by mouth every 6 hours as needed for nausea or vomiting 30 tablet 2    prochlorperazine (COMPAZINE) 10 MG tablet Take 1 tablet (10 mg) by mouth every 6 hours as needed for nausea or vomiting 30 tablet 2    tacrolimus (PROGRAF-GENERIC EQUIVALENT) 5 MG capsule Take 5 mg by mouth daily      tamsulosin (FLOMAX) 0.4 MG capsule Take 0.4 mg by mouth daily      tiotropium-olodaterol 2.5-2.5 MCG/ACT AERS INHALE 2 PUFFS BY INHALATION EVERY DAY FOR COPD      VITAMIN D, CHOLECALCIFEROL, PO Take 1 tablet by mouth once a week          No Known Allergies      Video physical exam  General: Patient appears in no acute distress.  Skin: No visualized rash. Bruising noted around the right eye.  Eyes: EOMI, no erythema, sclera icterus or discharge noted  Resp: Appears to be breathing comfortably  "without accessory muscle usage, speaking in full sentences, no cough  MSK: Appears to have normal range of motion based on visualized movements  Neurologic: No apparent tremors, facial movements symmetric  Psych: affect pleasant, engaged, alert and oriented   Height 1.778 m (5' 10\"), weight 104.8 kg (231 lb).  Wt Readings from Last 4 Encounters:   04/04/24 104.8 kg (231 lb)   03/21/24 104.8 kg (231 lb)   02/28/24 98.9 kg (218 lb)   02/09/24 101.2 kg (223 lb)         Labs:    Latest Reference Range & Units 04/04/24 11:45   Sodium 135 - 145 mmol/L 140   Potassium 3.4 - 5.3 mmol/L 4.6   Chloride 98 - 107 mmol/L 105   Carbon Dioxide (CO2) 22 - 29 mmol/L 27   Urea Nitrogen 8.0 - 23.0 mg/dL 34.4 (H)   Creatinine 0.67 - 1.17 mg/dL 1.60 (H)   GFR Estimate >60 mL/min/1.73m2 45 (L)   Calcium 8.8 - 10.2 mg/dL 9.3   Anion Gap 7 - 15 mmol/L 8   Albumin 3.5 - 5.2 g/dL 3.3 (L)   Protein Total 6.4 - 8.3 g/dL 6.0 (L)   Alkaline Phosphatase 40 - 150 U/L 73   ALT 0 - 70 U/L 24   AST 0 - 45 U/L 16   Bilirubin Total <=1.2 mg/dL 0.3   Glucose 70 - 99 mg/dL 269 (H)   WBC 4.0 - 11.0 10e3/uL 5.2   Hemoglobin 13.3 - 17.7 g/dL 10.3 (L)   Hematocrit 40.0 - 53.0 % 32.9 (L)   Platelet Count 150 - 450 10e3/uL 101 (L)   RBC Count 4.40 - 5.90 10e6/uL 3.29 (L)   MCV 78 - 100 fL 100   MCH 26.5 - 33.0 pg 31.3   MCHC 31.5 - 36.5 g/dL 31.3 (L)   RDW 10.0 - 15.0 % 15.1 (H)   % Neutrophils % 70   % Lymphocytes % 22   % Monocytes % 6   % Eosinophils % 1   % Basophils % 0   Absolute Basophils 0.0 - 0.2 10e3/uL 0.0   Absolute Eosinophils 0.0 - 0.7 10e3/uL 0.1   Absolute Immature Granulocytes <=0.4 10e3/uL 0.0   Absolute Lymphocytes 0.8 - 5.3 10e3/uL 1.2   Absolute Monocytes 0.0 - 1.3 10e3/uL 0.3   % Immature Granulocytes % 1   Absolute Neutrophils 1.6 - 8.3 10e3/uL 3.7   Absolute NRBCs 10e3/uL 0.0   NRBCs per 100 WBC <1 /100 0   (H): Data is abnormally high  (L): Data is abnormally low        Impression/plan:     Adenocarcinoma of the cecum, moderately " differentiated, MSI-high (loss of MLH1 and PMS2, MLH1 promoter hypermethylated), BRAF V600 mutation positive, stage 3 with enlarged pericecal mesenteric lymphadenopathy  -baseline PS, low ECOG 2. Given this and multiple comorbidities, has been initiated on infusional 5FU with leucovorin, first cycle on 3/21/24. Tolerated treatment well.  -he has had more difficulty with irratic glucoses with the steroids given on cycle 1. Since we are not using oxaliplatin the emetogenic potential is low. We will discontinue IV and oral dexamethasone and continue IV zofran. He has oral antiemetics at home if he has breakthrough nausea  -will request MICHAEL follow-up prior to the next infusion and in 1 month. Follow-up with Dr. Blunt requested at the completion of cycle 6 with a CT CAP  (non-contrast with CKD) and MRI peritoneum to assess response. Will then review potential of surgical resectability with Dr. Reid    Hx of renal transplant, previously on tacrolimus and cellcept-both on hold   He did confirm he had been taking prednisone. Had been holding prednisone on the days of oral and IV dexamethasone. Reviewed that he does not need to hold as I am stopping the dexamethasone for this cycle. If we add it back, then we can discuss holding again.  -[facility]    DM2  -hyperglycemia secondary to steroids. Insulin had been increased, resulting in some hypoglycemic episodes. Reviewed that we will be stopping the steroids, except for baseline prednisone. Will monitor and discuss adjustments with his primary provider    BLE edema, R>L  -US in Dec 2023 negative for DVT, no significant changes to suggest progression    HTN: on amlodipine and carvedilol, managed by transplant team    The longitudinal plan of care for the diagnosis(es)/condition(s) as documented were addressed during this visit. Due to the added complexity in care, I will continue to support Berny in the subsequent management and with ongoing  continuity of care.

## 2024-04-04 NOTE — PROGRESS NOTES
Infusion Nursing Note:  Berny Estrada presents today for C2D1 Leucovorin and 5FU Home Pump Connect.    Patient seen by provider today: Yes: Jocelin Segal   present during visit today: Not Applicable.    Note:   Discussed patient's case with Jocelin BUNN CNP. Patient's sugar levels at home go very high and get very low since last cycle of chemo . Patient got Decadron as an IV premed and took oral steroids at home with last cycle of chemo.  Since patient isn't getting Oxaliplatin patient doesn't need IV or oral Decadron.  Writer explained this to wife and patient whom state patient won't take Decadron at home.    Patient needed stand by assist to get from wheelchair to scale.  Writer parked wheelchair approximately two feet from infusion chair.  Patient/wife state wheelchair needed to be closer to infusion chair as patient wasn't able to walk that far. State patient has been very tired. Writer assisted patient to pivot transfer to infusion chair.    Patient has bruises by his eyes from a fall in March. Wife states patient had a fall in past 2 weeks on side of his bed. Has a large bruise on his waist.  Encouraged patient to get support with transfers at home, to prevent falls, as his platelets are 101 and he could have bleeding.  Patient/wife verbalized understanding and agreement with this plan.    Intravenous Access:  Implanted Port.    Treatment Conditions:  Lab Results   Component Value Date    HGB 10.3 (L) 04/04/2024    WBC 5.2 04/04/2024    ANEUTAUTO 3.7 04/04/2024     (L) 04/04/2024        Lab Results   Component Value Date     04/04/2024    POTASSIUM 4.6 04/04/2024    CR 1.60 (H) 04/04/2024    VIVI 9.3 04/04/2024    BILITOTAL 0.3 04/04/2024    ALBUMIN 3.3 (L) 04/04/2024    ALT 24 04/04/2024    AST 16 04/04/2024       Results reviewed, labs MET treatment parameters, ok to proceed with treatment.      Post Infusion Assessment:  Patient tolerated infusion without incident.  Blood  "return noted pre and post infusion.  Site patent and intact, free from redness, edema or discomfort.  No evidence of extravasations.  Prior to discharge: Port is secured in place with tegaderm and flushed with 10cc NS with positive blood return noted.    Continuous home infusion Dosi-Fuser pump connected.    All connectors secured in place and clamps taped open.    Pump started, \"running\" noted on display (CADD): Not Applicable.   Capillary element taped to pt's skin per protocol.  Pump Connection double checked with Brooke Loo RN.  Patient instructed to call our clinic or [facility] with any questions or concerns at home.  Patient verbalized understanding.    Message sent to [facility] to disconnect patient's pump and give IV fluids on Saturday 4/6/24 at 11:00 am.       Discharge Plan:   AVS to patient via MYCHART.  Patient will return 4/18/24 for next appointment.   Patient discharged in stable condition accompanied by: wife.  Departure Mode: Wheelchair.      Mini Barrera RN  "

## 2024-04-04 NOTE — LETTER
4/4/2024         RE: Berny Estrada  [address] [address]  [place] [address]        Dear Colleague,    Thank you for referring your patient, Berny Estrada, to the [facility]. Please see a copy of my visit note below.    Virtual Visit Details    Type of service:  Video Visit   Video Start Time:  1005  Video End Time:1026    Originating Location (pt. Location): Home    Distant Location (provider location):  Off-site  Platform used for Video Visit: Mario    Reason for Visit: seen in follow-up of cecal  adenocarcinoma    Oncology HPI:     Berny Estrada is a 73 year old male with medical history including right renal transplant (2014), hypertension, type 2 diabetes with proliferative diabetic retinopathy and long-term insulin use, CKD stage III, CAD, COPD, GERD, colon polyps (tubular adenoma), esophageal candidiasis, cholelithiasis, sleep apnea, hx of tobacco use (quit 50 years ago)-        Presenting complaint of weight loss 10/23-11/23 10-15 lbs, then gain 10 over past few months and Significant ECOG decline over the past 6 months,  using wheelchair and supplemental oxygen  -12/1/23- 12/9/23 hospitalized 2/2 anemia, hgb 7.9 w/melena x few months (was on iron)  - 12/6/23 CEA 11.69, 12/7/23 CEA 10  - 12/14/2023 EGD: Patchy white plaques in proximal esophagus and midesophagus suspicious for candidiasis, otherwise normal  -12/14/2023 [facility]:  - Fungating partially obstructing, partially circumferential (involving two thirds of the lumen circumference) large mass found in the cecum, including IC valve, with oozing              - Adenocarcinoma, moderately differentiated         - Loss of expression of MLH1 and PMS2; intact expression of MSH2 and MSH6   - MLH1 promoter methylation: POSITIVE   - BRAF V600 mutation positive  - 4 sessile polyps in ascending colon, 3-5 mm in size- - Sessile serrated adenoma with  focal high-grade dysplasia and Separate minute fragment of adenocarcinoma  - 2 mm sessile polyp in transverse colon- Tubular adenoma; negative for high-grade dysplasia  - 2 sessile polyps in the ascending colon, 4-5 mm in size- Tubular adenoma; negative for high-grade dysplasia  - 6 sessile polyps in the rectosigmoid colon, 3-8 mm in size- Sessile serrated adenoma with focal cytologic dysplasia. No evidence of high-grade dysplasia or invasive carcinoma    He was recommended to start systemic therapy with 5FU and leucovorin    Prior to starting chemotherapy he tripped and fell and had complications of a Right orbital fracture.      He went on to receive cycle 1 on 3/21/24.    Interval history:   Berny is seen for evaluation prior to cycle 2. He notes little side effect of chemotherapy. No nausea or vomiting. No diarrhea. No mouth soreness or hand/foot tenderness. Energy has been about the same as baseline. Walking is limited by a prior ankle injury. Only able to walk a few feet unassisted.  Lower extremity edema is about the same in the past few weeks, worse in the last few months. Has had ultrasounds to evaluate for DVT that were negative ([facility]).    -Since starting chemotherapy, his glucoses have been hard to regulate. He has been hyperglycemic, requiring additional insulin, but then has been waking to alarms of hypoglycemia. His monitor woke him last night with a glucose of 52.  He is eating ok, but notes that he didn't eat much after taking the extra insulin last night.    -no cough, shortness of breath, chest pain.    Current Outpatient Medications   Medication Sig Dispense Refill    albuterol (PROAIR HFA/PROVENTIL HFA/VENTOLIN HFA) 108 (90 Base) MCG/ACT inhaler INHALE 2 PUFFS BY INHALATION EVERY 6 HOURS AS NEEDED FOR SHORTNESS OF BREATH, FOR SECRETIONS      amLODIPine (NORVASC) 10 MG tablet Take 10 mg by mouth daily      carvedilol (COREG) 12.5 MG tablet Take 12.5 mg by mouth 2 times daily       dexAMETHasone (DECADRON) 4 MG tablet Take 2 tablets (8 mg) by mouth daily Take for 2 days, starting the day after chemo. Take with food. 4 tablet 2    dexAMETHasone (DECADRON) 4 MG tablet Take 2 tablets (8 mg) by mouth daily Take for 2 days, starting the day after chemo. Take with food. 4 tablet 2    insulin aspart U-10, diluted conc 10 units/mL, (NOVOLOG) 10 unit/ml injection Inject Subcutaneous 3 times daily (before meals) 10-16 units      INSULIN GLARGINE SC Inject 34 Units Subcutaneous at bedtime 7/11 am      ipratropium - albuterol 0.5 mg/2.5 mg/3 mL (DUONEB) 0.5-2.5 (3) MG/3ML neb solution INHALE 3ML IN NEBULIZER BY INHALATION FOUR TIMES A DAY AS NEEDED FOR SHORTNESS OF BREATH      lidocaine-prilocaine (EMLA) 2.5-2.5 % external cream Apply topically as needed for moderate pain 30 g 1    loratadine (CLARITIN) 10 MG tablet 10 mg      Magnesium Oxide 420 MG TABS Take 1 tablet by mouth 2 times daily      mycophenolate, IDS 3865, (CELLCEPT) 250 MG capsule Take 2 tablets by mouth 2 times daily      OMEPRAZOLE PO Take 1 tablet by mouth daily      ondansetron (ZOFRAN) 8 MG tablet Take 1 tablet (8 mg) by mouth every 8 hours as needed for nausea (vomiting) 30 tablet 2    ondansetron (ZOFRAN) 8 MG tablet Take 1 tablet (8 mg) by mouth every 8 hours as needed for nausea (vomiting) 30 tablet 2    predniSONE (DELTASONE) 10 MG tablet Take 40 mg by mouth daily      prochlorperazine (COMPAZINE) 10 MG tablet Take 1 tablet (10 mg) by mouth every 6 hours as needed for nausea or vomiting 30 tablet 2    prochlorperazine (COMPAZINE) 10 MG tablet Take 1 tablet (10 mg) by mouth every 6 hours as needed for nausea or vomiting 30 tablet 2    tacrolimus (PROGRAF-GENERIC EQUIVALENT) 5 MG capsule Take 5 mg by mouth daily      tamsulosin (FLOMAX) 0.4 MG capsule Take 0.4 mg by mouth daily      tiotropium-olodaterol 2.5-2.5 MCG/ACT AERS INHALE 2 PUFFS BY INHALATION EVERY DAY FOR COPD      VITAMIN D, CHOLECALCIFEROL, PO Take 1 tablet by mouth  "once a week          No Known Allergies      Video physical exam  General: Patient appears in no acute distress.  Skin: No visualized rash. Bruising noted around the right eye.  Eyes: EOMI, no erythema, sclera icterus or discharge noted  Resp: Appears to be breathing comfortably without accessory muscle usage, speaking in full sentences, no cough  MSK: Appears to have normal range of motion based on visualized movements  Neurologic: No apparent tremors, facial movements symmetric  Psych: affect pleasant, engaged, alert and oriented   Height 1.778 m (5' 10\"), weight 104.8 kg (231 lb).  Wt Readings from Last 4 Encounters:   04/04/24 104.8 kg (231 lb)   03/21/24 104.8 kg (231 lb)   02/28/24 98.9 kg (218 lb)   02/09/24 101.2 kg (223 lb)         Labs:    Latest Reference Range & Units 04/04/24 11:45   Sodium 135 - 145 mmol/L 140   Potassium 3.4 - 5.3 mmol/L 4.6   Chloride 98 - 107 mmol/L 105   Carbon Dioxide (CO2) 22 - 29 mmol/L 27   Urea Nitrogen 8.0 - 23.0 mg/dL 34.4 (H)   Creatinine 0.67 - 1.17 mg/dL 1.60 (H)   GFR Estimate >60 mL/min/1.73m2 45 (L)   Calcium 8.8 - 10.2 mg/dL 9.3   Anion Gap 7 - 15 mmol/L 8   Albumin 3.5 - 5.2 g/dL 3.3 (L)   Protein Total 6.4 - 8.3 g/dL 6.0 (L)   Alkaline Phosphatase 40 - 150 U/L 73   ALT 0 - 70 U/L 24   AST 0 - 45 U/L 16   Bilirubin Total <=1.2 mg/dL 0.3   Glucose 70 - 99 mg/dL 269 (H)   WBC 4.0 - 11.0 10e3/uL 5.2   Hemoglobin 13.3 - 17.7 g/dL 10.3 (L)   Hematocrit 40.0 - 53.0 % 32.9 (L)   Platelet Count 150 - 450 10e3/uL 101 (L)   RBC Count 4.40 - 5.90 10e6/uL 3.29 (L)   MCV 78 - 100 fL 100   MCH 26.5 - 33.0 pg 31.3   MCHC 31.5 - 36.5 g/dL 31.3 (L)   RDW 10.0 - 15.0 % 15.1 (H)   % Neutrophils % 70   % Lymphocytes % 22   % Monocytes % 6   % Eosinophils % 1   % Basophils % 0   Absolute Basophils 0.0 - 0.2 10e3/uL 0.0   Absolute Eosinophils 0.0 - 0.7 10e3/uL 0.1   Absolute Immature Granulocytes <=0.4 10e3/uL 0.0   Absolute Lymphocytes 0.8 - 5.3 10e3/uL 1.2   Absolute Monocytes 0.0 - 1.3 " 10e3/uL 0.3   % Immature Granulocytes % 1   Absolute Neutrophils 1.6 - 8.3 10e3/uL 3.7   Absolute NRBCs 10e3/uL 0.0   NRBCs per 100 WBC <1 /100 0   (H): Data is abnormally high  (L): Data is abnormally low        Impression/plan:     Adenocarcinoma of the cecum, moderately differentiated, MSI-high (loss of MLH1 and PMS2, MLH1 promoter hypermethylated), BRAF V600 mutation positive, stage 3 with enlarged pericecal mesenteric lymphadenopathy  -baseline PS, low ECOG 2. Given this and multiple comorbidities, has been initiated on infusional 5FU with leucovorin, first cycle on 3/21/24. Tolerated treatment well.  -he has had more difficulty with irratic glucoses with the steroids given on cycle 1. Since we are not using oxaliplatin the emetogenic potential is low. We will discontinue IV and oral dexamethasone and continue IV zofran. He has oral antiemetics at home if he has breakthrough nausea  -will request MICHAEL follow-up prior to the next infusion and in 1 month. Follow-up with Dr. Blunt requested at the completion of cycle 6 with a CT CAP  (non-contrast with CKD) and MRI peritoneum to assess response. Will then review potential of surgical resectability with Dr. Reid    Hx of renal transplant, previously on tacrolimus and cellcept-both on hold   He did confirm he had been taking prednisone. Had been holding prednisone on the days of oral and IV dexamethasone. Reviewed that he does not need to hold as I am stopping the dexamethasone for this cycle. If we add it back, then we can discuss holding again.  -[facility]    DM2  -hyperglycemia secondary to steroids. Insulin had been increased, resulting in some hypoglycemic episodes. Reviewed that we will be stopping the steroids, except for baseline prednisone. Will monitor and discuss adjustments with his primary provider    BLE edema, R>L  -US in Dec 2023 negative for DVT, no significant changes to suggest progression    HTN: on amlodipine and  carvedilol, managed by transplant team    The longitudinal plan of care for the diagnosis(es)/condition(s) as documented were addressed during this visit. Due to the added complexity in care, I will continue to support Berny in the subsequent management and with ongoing continuity of care.      ONI Keys CNP

## 2024-04-15 RX ORDER — DIPHENHYDRAMINE HYDROCHLORIDE 50 MG/ML
50 INJECTION INTRAMUSCULAR; INTRAVENOUS
Status: CANCELLED
Start: 2024-05-16

## 2024-04-15 RX ORDER — EPINEPHRINE 1 MG/ML
0.3 INJECTION, SOLUTION INTRAMUSCULAR; SUBCUTANEOUS EVERY 5 MIN PRN
Status: CANCELLED | OUTPATIENT
Start: 2024-06-17

## 2024-04-15 RX ORDER — HEPARIN SODIUM,PORCINE 10 UNIT/ML
5-20 VIAL (ML) INTRAVENOUS DAILY PRN
Status: CANCELLED | OUTPATIENT
Start: 2024-05-16

## 2024-04-15 RX ORDER — HEPARIN SODIUM (PORCINE) LOCK FLUSH IV SOLN 100 UNIT/ML 100 UNIT/ML
5 SOLUTION INTRAVENOUS
OUTPATIENT
Start: 2024-07-17

## 2024-04-15 RX ORDER — HEPARIN SODIUM,PORCINE 10 UNIT/ML
5-20 VIAL (ML) INTRAVENOUS DAILY PRN
Status: CANCELLED | OUTPATIENT
Start: 2024-05-04

## 2024-04-15 RX ORDER — METHYLPREDNISOLONE SODIUM SUCCINATE 125 MG/2ML
125 INJECTION, POWDER, LYOPHILIZED, FOR SOLUTION INTRAMUSCULAR; INTRAVENOUS
Status: CANCELLED
Start: 2024-05-16

## 2024-04-15 RX ORDER — HEPARIN SODIUM (PORCINE) LOCK FLUSH IV SOLN 100 UNIT/ML 100 UNIT/ML
5 SOLUTION INTRAVENOUS
Status: CANCELLED | OUTPATIENT
Start: 2024-04-18

## 2024-04-15 RX ORDER — ONDANSETRON 2 MG/ML
8 INJECTION INTRAMUSCULAR; INTRAVENOUS ONCE
Status: CANCELLED | OUTPATIENT
Start: 2024-06-17

## 2024-04-15 RX ORDER — ALBUTEROL SULFATE 90 UG/1
1-2 AEROSOL, METERED RESPIRATORY (INHALATION)
Status: CANCELLED
Start: 2024-06-17

## 2024-04-15 RX ORDER — MEPERIDINE HYDROCHLORIDE 25 MG/ML
25 INJECTION INTRAMUSCULAR; INTRAVENOUS; SUBCUTANEOUS EVERY 30 MIN PRN
Status: CANCELLED | OUTPATIENT
Start: 2024-06-17

## 2024-04-15 RX ORDER — ALBUTEROL SULFATE 0.83 MG/ML
2.5 SOLUTION RESPIRATORY (INHALATION)
Status: CANCELLED | OUTPATIENT
Start: 2024-05-02

## 2024-04-15 RX ORDER — ALBUTEROL SULFATE 0.83 MG/ML
2.5 SOLUTION RESPIRATORY (INHALATION)
Status: CANCELLED | OUTPATIENT
Start: 2024-05-16

## 2024-04-15 RX ORDER — ALBUTEROL SULFATE 90 UG/1
1-2 AEROSOL, METERED RESPIRATORY (INHALATION)
Status: CANCELLED
Start: 2024-04-18

## 2024-04-15 RX ORDER — DIPHENHYDRAMINE HYDROCHLORIDE 50 MG/ML
50 INJECTION INTRAMUSCULAR; INTRAVENOUS
Status: CANCELLED
Start: 2024-05-02

## 2024-04-15 RX ORDER — DIPHENHYDRAMINE HYDROCHLORIDE 50 MG/ML
50 INJECTION INTRAMUSCULAR; INTRAVENOUS
Status: CANCELLED
Start: 2024-04-18

## 2024-04-15 RX ORDER — HEPARIN SODIUM,PORCINE 10 UNIT/ML
5-20 VIAL (ML) INTRAVENOUS DAILY PRN
Status: CANCELLED | OUTPATIENT
Start: 2024-04-20

## 2024-04-15 RX ORDER — EPINEPHRINE 1 MG/ML
0.3 INJECTION, SOLUTION INTRAMUSCULAR; SUBCUTANEOUS EVERY 5 MIN PRN
Status: CANCELLED | OUTPATIENT
Start: 2024-05-16

## 2024-04-15 RX ORDER — ALBUTEROL SULFATE 90 UG/1
1-2 AEROSOL, METERED RESPIRATORY (INHALATION)
Status: CANCELLED
Start: 2024-05-02

## 2024-04-15 RX ORDER — METHYLPREDNISOLONE SODIUM SUCCINATE 125 MG/2ML
125 INJECTION, POWDER, LYOPHILIZED, FOR SOLUTION INTRAMUSCULAR; INTRAVENOUS
Status: CANCELLED
Start: 2024-05-02

## 2024-04-15 RX ORDER — HEPARIN SODIUM (PORCINE) LOCK FLUSH IV SOLN 100 UNIT/ML 100 UNIT/ML
5 SOLUTION INTRAVENOUS
Status: CANCELLED | OUTPATIENT
Start: 2024-06-17

## 2024-04-15 RX ORDER — MEPERIDINE HYDROCHLORIDE 25 MG/ML
25 INJECTION INTRAMUSCULAR; INTRAVENOUS; SUBCUTANEOUS EVERY 30 MIN PRN
Status: CANCELLED | OUTPATIENT
Start: 2024-04-18

## 2024-04-15 RX ORDER — HEPARIN SODIUM (PORCINE) LOCK FLUSH IV SOLN 100 UNIT/ML 100 UNIT/ML
5 SOLUTION INTRAVENOUS
Status: CANCELLED | OUTPATIENT
Start: 2024-05-18

## 2024-04-15 RX ORDER — METHYLPREDNISOLONE SODIUM SUCCINATE 125 MG/2ML
125 INJECTION, POWDER, LYOPHILIZED, FOR SOLUTION INTRAMUSCULAR; INTRAVENOUS
Status: CANCELLED
Start: 2024-04-18

## 2024-04-15 RX ORDER — ONDANSETRON 2 MG/ML
8 INJECTION INTRAMUSCULAR; INTRAVENOUS ONCE
Status: CANCELLED | OUTPATIENT
Start: 2024-05-16

## 2024-04-15 RX ORDER — ONDANSETRON 2 MG/ML
8 INJECTION INTRAMUSCULAR; INTRAVENOUS ONCE
Status: CANCELLED | OUTPATIENT
Start: 2024-04-18

## 2024-04-15 RX ORDER — HEPARIN SODIUM,PORCINE 10 UNIT/ML
5-20 VIAL (ML) INTRAVENOUS DAILY PRN
Status: CANCELLED | OUTPATIENT
Start: 2024-05-18

## 2024-04-15 RX ORDER — METHYLPREDNISOLONE SODIUM SUCCINATE 125 MG/2ML
125 INJECTION, POWDER, LYOPHILIZED, FOR SOLUTION INTRAMUSCULAR; INTRAVENOUS
Status: CANCELLED
Start: 2024-06-17

## 2024-04-15 RX ORDER — MEPERIDINE HYDROCHLORIDE 25 MG/ML
25 INJECTION INTRAMUSCULAR; INTRAVENOUS; SUBCUTANEOUS EVERY 30 MIN PRN
Status: CANCELLED | OUTPATIENT
Start: 2024-05-16

## 2024-04-15 RX ORDER — HEPARIN SODIUM,PORCINE 10 UNIT/ML
5-20 VIAL (ML) INTRAVENOUS DAILY PRN
Status: CANCELLED | OUTPATIENT
Start: 2024-05-02

## 2024-04-15 RX ORDER — ALBUTEROL SULFATE 90 UG/1
1-2 AEROSOL, METERED RESPIRATORY (INHALATION)
Status: CANCELLED
Start: 2024-05-16

## 2024-04-15 RX ORDER — HEPARIN SODIUM,PORCINE 10 UNIT/ML
5-20 VIAL (ML) INTRAVENOUS DAILY PRN
OUTPATIENT
Start: 2024-07-17

## 2024-04-15 RX ORDER — ONDANSETRON 2 MG/ML
8 INJECTION INTRAMUSCULAR; INTRAVENOUS ONCE
Status: CANCELLED | OUTPATIENT
Start: 2024-05-02

## 2024-04-15 RX ORDER — DIPHENHYDRAMINE HYDROCHLORIDE 50 MG/ML
50 INJECTION INTRAMUSCULAR; INTRAVENOUS
Status: CANCELLED
Start: 2024-06-17

## 2024-04-15 RX ORDER — HEPARIN SODIUM,PORCINE 10 UNIT/ML
5-20 VIAL (ML) INTRAVENOUS DAILY PRN
Status: CANCELLED | OUTPATIENT
Start: 2024-06-17

## 2024-04-15 RX ORDER — ALBUTEROL SULFATE 0.83 MG/ML
2.5 SOLUTION RESPIRATORY (INHALATION)
Status: CANCELLED | OUTPATIENT
Start: 2024-06-17

## 2024-04-15 RX ORDER — EPINEPHRINE 1 MG/ML
0.3 INJECTION, SOLUTION INTRAMUSCULAR; SUBCUTANEOUS EVERY 5 MIN PRN
Status: CANCELLED | OUTPATIENT
Start: 2024-05-02

## 2024-04-15 RX ORDER — HEPARIN SODIUM (PORCINE) LOCK FLUSH IV SOLN 100 UNIT/ML 100 UNIT/ML
5 SOLUTION INTRAVENOUS
Status: CANCELLED | OUTPATIENT
Start: 2024-05-02

## 2024-04-15 RX ORDER — ALBUTEROL SULFATE 0.83 MG/ML
2.5 SOLUTION RESPIRATORY (INHALATION)
Status: CANCELLED | OUTPATIENT
Start: 2024-04-18

## 2024-04-15 RX ORDER — HEPARIN SODIUM (PORCINE) LOCK FLUSH IV SOLN 100 UNIT/ML 100 UNIT/ML
5 SOLUTION INTRAVENOUS
Status: CANCELLED | OUTPATIENT
Start: 2024-05-04

## 2024-04-15 RX ORDER — HEPARIN SODIUM (PORCINE) LOCK FLUSH IV SOLN 100 UNIT/ML 100 UNIT/ML
5 SOLUTION INTRAVENOUS
Status: CANCELLED | OUTPATIENT
Start: 2024-05-16

## 2024-04-15 RX ORDER — HEPARIN SODIUM (PORCINE) LOCK FLUSH IV SOLN 100 UNIT/ML 100 UNIT/ML
5 SOLUTION INTRAVENOUS
Status: CANCELLED | OUTPATIENT
Start: 2024-04-20

## 2024-04-15 RX ORDER — HEPARIN SODIUM,PORCINE 10 UNIT/ML
5-20 VIAL (ML) INTRAVENOUS DAILY PRN
Status: CANCELLED | OUTPATIENT
Start: 2024-04-18

## 2024-04-15 RX ORDER — MEPERIDINE HYDROCHLORIDE 25 MG/ML
25 INJECTION INTRAMUSCULAR; INTRAVENOUS; SUBCUTANEOUS EVERY 30 MIN PRN
Status: CANCELLED | OUTPATIENT
Start: 2024-05-02

## 2024-04-15 RX ORDER — EPINEPHRINE 1 MG/ML
0.3 INJECTION, SOLUTION INTRAMUSCULAR; SUBCUTANEOUS EVERY 5 MIN PRN
Status: CANCELLED | OUTPATIENT
Start: 2024-04-18

## 2024-04-16 NOTE — PROGRESS NOTES
Oncology Follow Up Visit: April 18, 2024    Oncologist: Dr. Blunt   PCP: Cat Goodson    Reason for Visit: Follow-up cecal adenocarcinoma prior to C3    Oncology HPI:  Berny Estrada is a 73 year old male with medical history including right renal transplant (2014), hypertension, type 2 diabetes with proliferative diabetic retinopathy and long-term insulin use, CKD stage III, CAD, COPD, GERD, colon polyps (tubular adenoma), esophageal candidiasis, cholelithiasis, sleep apnea, hx of tobacco use (quit 50 years ago)  - Presenting complaint of weight loss 10/23-11/23 10-15 lbs, then gain 10 over past few months and significant ECOG decline over the past 6 months, using wheelchair and supplemental oxygen  - 12/1/23- 12/9/23 hospitalized 2/2 anemia, hgb 7.9 w/melena x few months (was on iron)  - 12/6/23 CEA 11.69, 12/7/23 CEA 10  - 12/14/2023 EGD: Patchy white plaques in proximal esophagus and midesophagus suspicious for candidiasis, otherwise normal  - 12/14/2023 [facility]: Fungating partially obstructing, partially circumferential (involving two thirds of the lumen circumference) large mass found in the cecum, including IC valve, with oozing  - Adenocarcinoma, moderately differentiated         - Loss of expression of MLH1 and PMS2; intact expression of MSH2 and MSH6  - MLH1 promoter methylation: POSITIVE   - BRAF V600 mutation positive  - 4 sessile polyps in ascending colon, 3-5 mm in size - Sessile serrated adenoma with focal high-grade dysplasia and Separate minute fragment of adenocarcinoma  - 2 mm sessile polyp in transverse colon - Tubular adenoma; negative for high-grade dysplasia  - 2 sessile polyps in the ascending colon, 4-5 mm in size - Tubular adenoma; negative for high-grade dysplasia  - 6 sessile polyps in the rectosigmoid colon, 3-8 mm in size- Sessile serrated adenoma with focal cytologic dysplasia. No evidence of high-grade dysplasia or invasive  carcinoma    Treatment:  3/21/24 started infusional 5FU and Leucovorin    Interval History:  Pt is seen in clinic today prior to C3 of 5FU and Leucovorin. He continues to tolerate treatment well with essentially no side effects. He endorses mild constipation but has not needed to treat it. He is not very active at home and does not engage in formal physical activity. He acknowledges he needs to drink more water despite encouragement from his family. No additional questions or concerns today.    Patient denies any of the following except if noted above: fevers, chills, difficulty with energy, vision or hearing changes, chest pain, dyspnea, abdominal pain, nausea, vomiting, diarrhea, constipation, urinary concerns, headaches, numbness, tingling, issues with sleep or mood. He also denies lumps, bumps, rashes or skin lesions, bleeding or bruising issues.    Physical Exam:  Wt 106.3 kg (234 lb 4.8 oz)   BMI 33.62 kg/m     BP Readings from Last 6 Encounters:   04/04/24 (!) 153/83   03/21/24 (!) 163/81   02/28/24 (!) 173/89   02/09/24 (!) 160/77   01/18/24 (!) 153/81   01/08/24 (!) 142/78     Wt Readings from Last 6 Encounters:   04/18/24 106.3 kg (234 lb 4.8 oz)   04/04/24 106.1 kg (234 lb)   04/04/24 104.8 kg (231 lb)   03/21/24 104.8 kg (231 lb)   02/28/24 98.9 kg (218 lb)   02/09/24 101.2 kg (223 lb)      Constitutional: No acute distress, pleasant, appropriately groomed.   ENT: PERRLA, sclera without erythema. Appropriate dentition.  Neck: Trachea midline, no adenopathy.   Resp: CTA, adequate depth and rate of respirations.   Cardiac: S1/S2, RRR, no murmurs. +2 edema to BLE, wearing compression socks.   Abdomen: Obese, BS active, abdomen soft and non-tender. No masses or organomegaly.   MS:  Used a wheelchair.  Skin: No rashes, lesions, or wounds on exposed skin.  Neuro: A/O x 4, sensation intact.   Psych: Appropriate mentation and affect.    Laboratory Results:   Results for orders placed or performed in visit on  04/18/24   Comprehensive metabolic panel     Status: Abnormal   Result Value Ref Range    Sodium 140 135 - 145 mmol/L    Potassium 5.1 3.4 - 5.3 mmol/L    Carbon Dioxide (CO2) 29 22 - 29 mmol/L    Anion Gap 8 7 - 15 mmol/L    Urea Nitrogen 31.5 (H) 8.0 - 23.0 mg/dL    Creatinine 1.83 (H) 0.67 - 1.17 mg/dL    GFR Estimate 38 (L) >60 mL/min/1.73m2    Calcium 9.0 8.8 - 10.2 mg/dL    Chloride 103 98 - 107 mmol/L    Glucose 192 (H) 70 - 99 mg/dL    Alkaline Phosphatase 66 40 - 150 U/L    AST 24 0 - 45 U/L    ALT 21 0 - 70 U/L    Protein Total 5.7 (L) 6.4 - 8.3 g/dL    Albumin 3.5 3.5 - 5.2 g/dL    Bilirubin Total 0.6 <=1.2 mg/dL   CBC with platelets and differential     Status: Abnormal   Result Value Ref Range    WBC Count 8.1 4.0 - 11.0 10e3/uL    RBC Count 3.15 (L) 4.40 - 5.90 10e6/uL    Hemoglobin 9.9 (L) 13.3 - 17.7 g/dL    Hematocrit 31.1 (L) 40.0 - 53.0 %    MCV 99 78 - 100 fL    MCH 31.4 26.5 - 33.0 pg    MCHC 31.8 31.5 - 36.5 g/dL    RDW 16.8 (H) 10.0 - 15.0 %    Platelet Count 109 (L) 150 - 450 10e3/uL    % Neutrophils 78 %    % Lymphocytes 13 %    % Monocytes 6 %    % Eosinophils 0 %    % Basophils 0 %    % Immature Granulocytes 1 %    NRBCs per 100 WBC 1 (H) <1 /100    Absolute Neutrophils 6.3 1.6 - 8.3 10e3/uL    Absolute Lymphocytes 1.1 0.8 - 5.3 10e3/uL    Absolute Monocytes 0.5 0.0 - 1.3 10e3/uL    Absolute Eosinophils 0.0 0.0 - 0.7 10e3/uL    Absolute Basophils 0.0 0.0 - 0.2 10e3/uL    Absolute Immature Granulocytes 0.1 <=0.4 10e3/uL    Absolute NRBCs 0.1 10e3/uL   CBC with platelets differential     Status: Abnormal    Narrative    The following orders were created for panel order CBC with platelets differential.  Procedure                               Abnormality         Status                     ---------                               -----------         ------                     CBC with platelets and d...[[id number]]  Abnormal            Final result                 Please view results for these  tests on the individual orders.   I reviewed the above labs today.    Imaging:  IR Chest Port Placement > 5 Yrs of Age  Narrative: PROCEDURE 2/28/2024:  1. Ultrasound guidance for venous access  2. Tunneled port (age > 5 yrs.) placement  3. Fluoroscopic guidance placement    CLINICAL HISTORY: 73-year-old male with colon cancer. Vascular port  placement requested.    COMPARISONS: CT chest abdomen and pelvis 12/21/2023    STAFF RADIOLOGIST: DELLA Estrada MD    MEDICATIONS: Sedation per the Department of Anesthesiology. The  patient remained stable throughout the procedure.    FLUOROSCOPY TIME: 54.3 seconds .    Dose: 13.16 mGy    PROCEDURE: The patient understood the limitations, alternatives, and  risks of the procedure and requested the procedure be performed. Both  written and oral consent were obtained.    Limited jugular ultrasound documented jugular vein patency.    The right neck and upper chest were prepped and draped in the usual  sterile fashion. 1% lidocaine without epinephrine was used for local  anesthesia.     Under ultrasound guidance, right internal jugular venotomy was made  with a micropuncture needle. Ultrasound image documenting right  internal jugular venous patency and needle venotomy was saved in the  patient's record.     Under fluoroscopic guidance, the micropuncture needle was exchanged  over guidewire for the micropuncture sheath. Intravascular catheter  length measured with the 0.018 guidewire. The inner portion of the  micropuncture set was removed and a 0.035 wire was advanced into the  IVC.     After local anesthetic was administered, and subcutaneous pocket was  created for the port reservoir over the right anterior chest using a  combination of sharp and blunt dissection. The pocket was liberally  irrigated with sterile saline. Catheter was subcutaneously tunneled  from the anterior chest pocket to the internal jugular venotomy site.  Catheter was cut to length (22 cm). Micropuncture  sheath was then  exchanged over guidewire for the peel-away sheath. Catheter placed  through the peel-away sheath and advanced under fluoroscopic guidance  to the cavoatrial junction.  Port aspirated and flushed adequately.  Port heparin locked with 100:1 heparin solution.     Fluoroscopic image documenting port placement and position was saved  in the patient's record.    The anterior chest incision was closed with 3-0 absorbable suture and  surgical glue. The internal jugular venotomy site closed with surgical  glue. No immediate complication.   Impression: IMPRESSION:   Ultrasound guided right internal jugular venotomy.    8 [place] 22 cm single-lumen power injectable port placed. Tip located  at the cavoatrial junction. Port heparin locked and ready for use.    ZURDO JACOBS MD         SYSTEM ID:  [id number]  I reviewed the above imaging report today.        Assessment and Plan:   Adenocarcinoma of the cecum, moderately differentiated, MSI-high (loss of MLH1 and PMS2, MLH1 promoter hypermethylated), BRAF V600 mutation positive, stage 3 with enlarged pericecal mesenteric lymphadenopathy.   - Now on treatment with infusional 5FU with leucovorin, first cycle on 3/21/24  - Continues to tolerate treatment well with minimal side effects. Encouraged patient to find small ways to incorporate more movement into his daily routine and drink more water. Discussed s/s of DVT / PE as patient is high risk given cancer + treatment and sedentary lifestyle.   - Exam and labs reviewed, okay to proceed with C3 today w/o changes  - Repeat CT CAP prior to cycle 6 with Dr. Blunt    DM II   - Hyperglycemia s/t steroids, recent discontinuation of IV and PO dexamethasone  - BS now trending 190's versus 400's previously.     BLE edema  - Overall stable  - [facility]23  - Encourage patient to elevated BLE when possible, wearing compression socks    Hx of renal transplant  - Previously on  tacrolimus and cellcept - both on hold  - Confirmed taking prednisone 40 mg daily  - [facility]      Mercedes Eze BUNN, CNP

## 2024-04-18 ENCOUNTER — LAB (OUTPATIENT)
Dept: INFUSION THERAPY | Facility: CLINIC | Age: 74
End: 2024-04-18
Attending: INTERNAL MEDICINE
Payer: MEDICARE

## 2024-04-18 ENCOUNTER — ONCOLOGY VISIT (OUTPATIENT)
Dept: ONCOLOGY | Facility: CLINIC | Age: 74
End: 2024-04-18
Attending: INTERNAL MEDICINE
Payer: MEDICARE

## 2024-04-18 VITALS — WEIGHT: 234.3 LBS | BODY MASS INDEX: 33.62 KG/M2

## 2024-04-18 DIAGNOSIS — C18.0 MALIGNANT NEOPLASM OF CECUM (H): Primary | ICD-10-CM

## 2024-04-18 DIAGNOSIS — Z94.0 KIDNEY REPLACED BY TRANSPLANT: ICD-10-CM

## 2024-04-18 DIAGNOSIS — E11.311 TYPE 2 DIABETES MELLITUS WITH RIGHT EYE AFFECTED BY RETINOPATHY AND MACULAR EDEMA, WITHOUT LONG-TERM CURRENT USE OF INSULIN, UNSPECIFIED RETINOPATHY SEVERITY (H): ICD-10-CM

## 2024-04-18 LAB
ALBUMIN SERPL BCG-MCNC: 3.5 G/DL (ref 3.5–5.2)
ALP SERPL-CCNC: 66 U/L (ref 40–150)
ALT SERPL W P-5'-P-CCNC: 21 U/L (ref 0–70)
ANION GAP SERPL CALCULATED.3IONS-SCNC: 8 MMOL/L (ref 7–15)
AST SERPL W P-5'-P-CCNC: 24 U/L (ref 0–45)
BASOPHILS # BLD AUTO: 0 10E3/UL (ref 0–0.2)
BASOPHILS NFR BLD AUTO: 0 %
BILIRUB SERPL-MCNC: 0.6 MG/DL
BUN SERPL-MCNC: 31.5 MG/DL (ref 8–23)
CALCIUM SERPL-MCNC: 9 MG/DL (ref 8.8–10.2)
CHLORIDE SERPL-SCNC: 103 MMOL/L (ref 98–107)
CREAT SERPL-MCNC: 1.83 MG/DL (ref 0.67–1.17)
DEPRECATED HCO3 PLAS-SCNC: 29 MMOL/L (ref 22–29)
EGFRCR SERPLBLD CKD-EPI 2021: 38 ML/MIN/1.73M2
EOSINOPHIL # BLD AUTO: 0 10E3/UL (ref 0–0.7)
EOSINOPHIL NFR BLD AUTO: 0 %
ERYTHROCYTE [DISTWIDTH] IN BLOOD BY AUTOMATED COUNT: 16.8 % (ref 10–15)
GLUCOSE SERPL-MCNC: 192 MG/DL (ref 70–99)
HCT VFR BLD AUTO: 31.1 % (ref 40–53)
HGB BLD-MCNC: 9.9 G/DL (ref 13.3–17.7)
IMM GRANULOCYTES # BLD: 0.1 10E3/UL
IMM GRANULOCYTES NFR BLD: 1 %
LYMPHOCYTES # BLD AUTO: 1.1 10E3/UL (ref 0.8–5.3)
LYMPHOCYTES NFR BLD AUTO: 13 %
MCH RBC QN AUTO: 31.4 PG (ref 26.5–33)
MCHC RBC AUTO-ENTMCNC: 31.8 G/DL (ref 31.5–36.5)
MCV RBC AUTO: 99 FL (ref 78–100)
MONOCYTES # BLD AUTO: 0.5 10E3/UL (ref 0–1.3)
MONOCYTES NFR BLD AUTO: 6 %
NEUTROPHILS # BLD AUTO: 6.3 10E3/UL (ref 1.6–8.3)
NEUTROPHILS NFR BLD AUTO: 78 %
NRBC # BLD AUTO: 0.1 10E3/UL
NRBC BLD AUTO-RTO: 1 /100
PLATELET # BLD AUTO: 109 10E3/UL (ref 150–450)
POTASSIUM SERPL-SCNC: 5.1 MMOL/L (ref 3.4–5.3)
PROT SERPL-MCNC: 5.7 G/DL (ref 6.4–8.3)
RBC # BLD AUTO: 3.15 10E6/UL (ref 4.4–5.9)
SODIUM SERPL-SCNC: 140 MMOL/L (ref 135–145)
WBC # BLD AUTO: 8.1 10E3/UL (ref 4–11)

## 2024-04-18 PROCEDURE — 96375 TX/PRO/DX INJ NEW DRUG ADDON: CPT

## 2024-04-18 PROCEDURE — 250N000011 HC RX IP 250 OP 636: Performed by: INTERNAL MEDICINE

## 2024-04-18 PROCEDURE — 85041 AUTOMATED RBC COUNT: CPT | Performed by: INTERNAL MEDICINE

## 2024-04-18 PROCEDURE — 99214 OFFICE O/P EST MOD 30 MIN: CPT

## 2024-04-18 PROCEDURE — 36591 DRAW BLOOD OFF VENOUS DEVICE: CPT

## 2024-04-18 PROCEDURE — 258N000003 HC RX IP 258 OP 636: Performed by: INTERNAL MEDICINE

## 2024-04-18 PROCEDURE — 82378 CARCINOEMBRYONIC ANTIGEN: CPT

## 2024-04-18 PROCEDURE — 96365 THER/PROPH/DIAG IV INF INIT: CPT

## 2024-04-18 PROCEDURE — G0498 CHEMO EXTEND IV INFUS W/PUMP: HCPCS

## 2024-04-18 PROCEDURE — 99207 PR NO CHARGE LOS: CPT

## 2024-04-18 PROCEDURE — 80053 COMPREHEN METABOLIC PANEL: CPT | Performed by: INTERNAL MEDICINE

## 2024-04-18 RX ORDER — HEPARIN SODIUM (PORCINE) LOCK FLUSH IV SOLN 100 UNIT/ML 100 UNIT/ML
500 SOLUTION INTRAVENOUS EVERY 8 HOURS
Status: DISCONTINUED | OUTPATIENT
Start: 2024-04-18 | End: 2024-04-18 | Stop reason: HOSPADM

## 2024-04-18 RX ORDER — ONDANSETRON 2 MG/ML
8 INJECTION INTRAMUSCULAR; INTRAVENOUS ONCE
Status: COMPLETED | OUTPATIENT
Start: 2024-04-18 | End: 2024-04-18

## 2024-04-18 RX ADMIN — SODIUM CHLORIDE, PRESERVATIVE FREE 500 UNITS: 5 INJECTION INTRAVENOUS at 13:40

## 2024-04-18 RX ADMIN — LEUCOVORIN CALCIUM 800 MG: 350 INJECTION, POWDER, LYOPHILIZED, FOR SOLUTION INTRAMUSCULAR; INTRAVENOUS at 14:48

## 2024-04-18 RX ADMIN — ONDANSETRON 8 MG: 2 INJECTION INTRAMUSCULAR; INTRAVENOUS at 14:46

## 2024-04-18 NOTE — PROGRESS NOTES
"Patient's name and  were verified.  See Doc Flowsheet - IV assess for details.  IVAD accessed with 20G 1\" power needle  blood return positive: YES  Site without redness, tenderness or swelling: YES  flushed with 20cc NS and 5cc 100u/ml heparin  Needle: Left accessed for infusion  Comments: Labs drawn.  Patient tolerated procedure without incident.    Treva Cho RN, BSN, OCN    "

## 2024-04-18 NOTE — PROGRESS NOTES
Infusion Nursing Note:  Berny Estrada presents today for C3D1 Leuco/5FU pump .    Patient seen by provider today: Yes: NP   present during visit today: Not Applicable.    Note: patient reports multi-system symptoms.  Hx of diabetes and transplant contribute to many of them.  He does have moderate fatigue and low activity tolerance with weakness and REINA (minimal exertion.)      Intravenous Access:  Implanted Port.    Treatment Conditions:  Results reviewed, labs MET treatment parameters, ok to proceed with treatment.      Post Infusion Assessment:  Patient tolerated infusion without incident.       Discharge Plan:   [facility] disconnect.  Inbasket sent to the team.  RTC 5/2 as scheduled for provider and next cycle.          ZAC AVERY RN

## 2024-04-18 NOTE — LETTER
4/18/2024         RE: Berny Estrada  [address] [address]  [place] [address]        Dear Colleague,    Thank you for referring your patient, Berny Estrada, to the [facility]. Please see a copy of my visit note below.    Oncology Follow Up Visit: April 18, 2024    Oncologist: Dr. Blunt   PCP: Cat Goodson    Reason for Visit: Follow-up cecal adenocarcinoma prior to C3    Oncology HPI:  Berny Estrada is a 73 year old male with medical history including right renal transplant (2014), hypertension, type 2 diabetes with proliferative diabetic retinopathy and long-term insulin use, CKD stage III, CAD, COPD, GERD, colon polyps (tubular adenoma), esophageal candidiasis, cholelithiasis, sleep apnea, hx of tobacco use (quit 50 years ago)  - Presenting complaint of weight loss 10/23-11/23 10-15 lbs, then gain 10 over past few months and significant ECOG decline over the past 6 months, using wheelchair and supplemental oxygen  - 12/1/23- 12/9/23 hospitalized 2/2 anemia, hgb 7.9 w/melena x few months (was on iron)  - 12/6/23 CEA 11.69, 12/7/23 CEA 10  - 12/14/2023 EGD: Patchy white plaques in proximal esophagus and midesophagus suspicious for candidiasis, otherwise normal  - 12/14/2023 [facility]: Fungating partially obstructing, partially circumferential (involving two thirds of the lumen circumference) large mass found in the cecum, including IC valve, with oozing  - Adenocarcinoma, moderately differentiated         - Loss of expression of MLH1 and PMS2; intact expression of MSH2 and MSH6  - MLH1 promoter methylation: POSITIVE   - BRAF V600 mutation positive  - 4 sessile polyps in ascending colon, 3-5 mm in size - Sessile serrated adenoma with focal high-grade dysplasia and Separate minute fragment of adenocarcinoma  - 2 mm sessile polyp in transverse colon - Tubular adenoma; negative for high-grade  dysplasia  - 2 sessile polyps in the ascending colon, 4-5 mm in size - Tubular adenoma; negative for high-grade dysplasia  - 6 sessile polyps in the rectosigmoid colon, 3-8 mm in size- Sessile serrated adenoma with focal cytologic dysplasia. No evidence of high-grade dysplasia or invasive carcinoma    Treatment:  3/21/24 started infusional 5FU and Leucovorin    Interval History:  Pt is seen in clinic today prior to C3 of 5FU and Leucovorin. He continues to tolerate treatment well with essentially no side effects. He endorses mild constipation but has not needed to treat it. He is not very active at home and does not engage in formal physical activity. He acknowledges he needs to drink more water despite encouragement from his family. No additional questions or concerns today.    Patient denies any of the following except if noted above: fevers, chills, difficulty with energy, vision or hearing changes, chest pain, dyspnea, abdominal pain, nausea, vomiting, diarrhea, constipation, urinary concerns, headaches, numbness, tingling, issues with sleep or mood. He also denies lumps, bumps, rashes or skin lesions, bleeding or bruising issues.    Physical Exam:  Wt 106.3 kg (234 lb 4.8 oz)   BMI 33.62 kg/m     BP Readings from Last 6 Encounters:   04/04/24 (!) 153/83   03/21/24 (!) 163/81   02/28/24 (!) 173/89   02/09/24 (!) 160/77   01/18/24 (!) 153/81   01/08/24 (!) 142/78     Wt Readings from Last 6 Encounters:   04/18/24 106.3 kg (234 lb 4.8 oz)   04/04/24 106.1 kg (234 lb)   04/04/24 104.8 kg (231 lb)   03/21/24 104.8 kg (231 lb)   02/28/24 98.9 kg (218 lb)   02/09/24 101.2 kg (223 lb)      Constitutional: No acute distress, pleasant, appropriately groomed.   ENT: PERRLA, sclera without erythema. Appropriate dentition.  Neck: Trachea midline, no adenopathy.   Resp: CTA, adequate depth and rate of respirations.   Cardiac: S1/S2, RRR, no murmurs. +2 edema to BLE, wearing compression socks.   Abdomen: Obese, BS active,  abdomen soft and non-tender. No masses or organomegaly.   MS:  Used a wheelchair.  Skin: No rashes, lesions, or wounds on exposed skin.  Neuro: A/O x 4, sensation intact.   Psych: Appropriate mentation and affect.    Laboratory Results:   Results for orders placed or performed in visit on 04/18/24   Comprehensive metabolic panel     Status: Abnormal   Result Value Ref Range    Sodium 140 135 - 145 mmol/L    Potassium 5.1 3.4 - 5.3 mmol/L    Carbon Dioxide (CO2) 29 22 - 29 mmol/L    Anion Gap 8 7 - 15 mmol/L    Urea Nitrogen 31.5 (H) 8.0 - 23.0 mg/dL    Creatinine 1.83 (H) 0.67 - 1.17 mg/dL    GFR Estimate 38 (L) >60 mL/min/1.73m2    Calcium 9.0 8.8 - 10.2 mg/dL    Chloride 103 98 - 107 mmol/L    Glucose 192 (H) 70 - 99 mg/dL    Alkaline Phosphatase 66 40 - 150 U/L    AST 24 0 - 45 U/L    ALT 21 0 - 70 U/L    Protein Total 5.7 (L) 6.4 - 8.3 g/dL    Albumin 3.5 3.5 - 5.2 g/dL    Bilirubin Total 0.6 <=1.2 mg/dL   CBC with platelets and differential     Status: Abnormal   Result Value Ref Range    WBC Count 8.1 4.0 - 11.0 10e3/uL    RBC Count 3.15 (L) 4.40 - 5.90 10e6/uL    Hemoglobin 9.9 (L) 13.3 - 17.7 g/dL    Hematocrit 31.1 (L) 40.0 - 53.0 %    MCV 99 78 - 100 fL    MCH 31.4 26.5 - 33.0 pg    MCHC 31.8 31.5 - 36.5 g/dL    RDW 16.8 (H) 10.0 - 15.0 %    Platelet Count 109 (L) 150 - 450 10e3/uL    % Neutrophils 78 %    % Lymphocytes 13 %    % Monocytes 6 %    % Eosinophils 0 %    % Basophils 0 %    % Immature Granulocytes 1 %    NRBCs per 100 WBC 1 (H) <1 /100    Absolute Neutrophils 6.3 1.6 - 8.3 10e3/uL    Absolute Lymphocytes 1.1 0.8 - 5.3 10e3/uL    Absolute Monocytes 0.5 0.0 - 1.3 10e3/uL    Absolute Eosinophils 0.0 0.0 - 0.7 10e3/uL    Absolute Basophils 0.0 0.0 - 0.2 10e3/uL    Absolute Immature Granulocytes 0.1 <=0.4 10e3/uL    Absolute NRBCs 0.1 10e3/uL   CBC with platelets differential     Status: Abnormal    Narrative    The following orders were created for panel order CBC with platelets  differential.  Procedure                               Abnormality         Status                     ---------                               -----------         ------                     CBC with platelets and d...[[id number]]  Abnormal            Final result                 Please view results for these tests on the individual orders.   I reviewed the above labs today.    Imaging:  IR Chest Port Placement > 5 Yrs of Age  Narrative: PROCEDURE 2/28/2024:  1. Ultrasound guidance for venous access  2. Tunneled port (age > 5 yrs.) placement  3. Fluoroscopic guidance placement    CLINICAL HISTORY: 73-year-old male with colon cancer. Vascular port  placement requested.    COMPARISONS: CT chest abdomen and pelvis 12/21/2023    STAFF RADIOLOGIST: DELLA Estrada MD    MEDICATIONS: Sedation per the Department of Anesthesiology. The  patient remained stable throughout the procedure.    FLUOROSCOPY TIME: 54.3 seconds .    Dose: 13.16 mGy    PROCEDURE: The patient understood the limitations, alternatives, and  risks of the procedure and requested the procedure be performed. Both  written and oral consent were obtained.    Limited jugular ultrasound documented jugular vein patency.    The right neck and upper chest were prepped and draped in the usual  sterile fashion. 1% lidocaine without epinephrine was used for local  anesthesia.     Under ultrasound guidance, right internal jugular venotomy was made  with a micropuncture needle. Ultrasound image documenting right  internal jugular venous patency and needle venotomy was saved in the  patient's record.     Under fluoroscopic guidance, the micropuncture needle was exchanged  over guidewire for the micropuncture sheath. Intravascular catheter  length measured with the 0.018 guidewire. The inner portion of the  micropuncture set was removed and a 0.035 wire was advanced into the  IVC.     After local anesthetic was administered, and subcutaneous pocket was  created for the port  reservoir over the right anterior chest using a  combination of sharp and blunt dissection. The pocket was liberally  irrigated with sterile saline. Catheter was subcutaneously tunneled  from the anterior chest pocket to the internal jugular venotomy site.  Catheter was cut to length (22 cm). Micropuncture sheath was then  exchanged over guidewire for the peel-away sheath. Catheter placed  through the peel-away sheath and advanced under fluoroscopic guidance  to the cavoatrial junction.  Port aspirated and flushed adequately.  Port heparin locked with 100:1 heparin solution.     Fluoroscopic image documenting port placement and position was saved  in the patient's record.    The anterior chest incision was closed with 3-0 absorbable suture and  surgical glue. The internal jugular venotomy site closed with surgical  glue. No immediate complication.   Impression: IMPRESSION:   Ultrasound guided right internal jugular venotomy.    8 [place] 22 cm single-lumen power injectable port placed. Tip located  at the cavoatrial junction. Port heparin locked and ready for use.    ZURDO JACOBS MD         SYSTEM ID:  [id number]  I reviewed the above imaging report today.        Assessment and Plan:   Adenocarcinoma of the cecum, moderately differentiated, MSI-high (loss of MLH1 and PMS2, MLH1 promoter hypermethylated), BRAF V600 mutation positive, stage 3 with enlarged pericecal mesenteric lymphadenopathy.   - Now on treatment with infusional 5FU with leucovorin, first cycle on 3/21/24  - Continues to tolerate treatment well with minimal side effects. Encouraged patient to find small ways to incorporate more movement into his daily routine and drink more water. Discussed s/s of DVT / PE as patient is high risk given cancer + treatment and sedentary lifestyle.   - Exam and labs reviewed, okay to proceed with C3 today w/o changes  - Repeat CT CAP prior to cycle 6 with Dr. Jose Raul COOLEY II   - Hyperglycemia s/t steroids, recent  discontinuation of IV and PO dexamethasone  - BS now trending 190's versus 400's previously.     BLE edema  - Overall stable  - [facility] 2023  - Encourage patient to elevated BLE when possible, wearing compression socks    Hx of renal transplant  - Previously on tacrolimus and cellcept - both on hold  - Confirmed taking prednisone 40 mg daily  - [facility]      Mercedes Loo - ONI, CNP      Again, thank you for allowing me to participate in the care of your patient.        Sincerely,        ONI Bennett CNP

## 2024-04-20 LAB — CEA SERPL-MCNC: 24.5 NG/ML

## 2024-04-24 ENCOUNTER — ANCILLARY PROCEDURE (OUTPATIENT)
Dept: CT IMAGING | Facility: CLINIC | Age: 74
End: 2024-04-24
Payer: MEDICARE

## 2024-04-24 ENCOUNTER — ANCILLARY PROCEDURE (OUTPATIENT)
Dept: MRI IMAGING | Facility: CLINIC | Age: 74
End: 2024-04-24
Payer: MEDICARE

## 2024-04-24 ENCOUNTER — ANCILLARY PROCEDURE (OUTPATIENT)
Dept: GENERAL RADIOLOGY | Facility: CLINIC | Age: 74
End: 2024-04-24
Payer: MEDICARE

## 2024-04-24 DIAGNOSIS — C18.0 MALIGNANT NEOPLASM OF CECUM (H): ICD-10-CM

## 2024-04-24 DIAGNOSIS — C18.9 MALIGNANT NEOPLASM OF COLON (H): Primary | ICD-10-CM

## 2024-04-24 PROCEDURE — A9585 GADOBUTROL INJECTION: HCPCS | Performed by: RADIOLOGY

## 2024-04-24 PROCEDURE — 74176 CT ABD & PELVIS W/O CONTRAST: CPT | Mod: MG | Performed by: STUDENT IN AN ORGANIZED HEALTH CARE EDUCATION/TRAINING PROGRAM

## 2024-04-24 PROCEDURE — 71250 CT THORAX DX C-: CPT | Mod: MG | Performed by: STUDENT IN AN ORGANIZED HEALTH CARE EDUCATION/TRAINING PROGRAM

## 2024-04-24 PROCEDURE — 74183 MRI ABD W/O CNTR FLWD CNTR: CPT | Performed by: RADIOLOGY

## 2024-04-24 PROCEDURE — 72197 MRI PELVIS W/O & W/DYE: CPT | Performed by: RADIOLOGY

## 2024-04-24 PROCEDURE — G1010 CDSM STANSON: HCPCS | Performed by: STUDENT IN AN ORGANIZED HEALTH CARE EDUCATION/TRAINING PROGRAM

## 2024-04-24 RX ORDER — GADOBUTROL 604.72 MG/ML
10 INJECTION INTRAVENOUS ONCE
Status: COMPLETED | OUTPATIENT
Start: 2024-04-24 | End: 2024-04-24

## 2024-04-24 RX ORDER — HEPARIN SODIUM (PORCINE) LOCK FLUSH IV SOLN 100 UNIT/ML 100 UNIT/ML
5 SOLUTION INTRAVENOUS ONCE
Status: COMPLETED | OUTPATIENT
Start: 2024-04-24 | End: 2024-04-24

## 2024-04-24 RX ADMIN — GADOBUTROL 10 ML: 604.72 INJECTION INTRAVENOUS at 09:22

## 2024-04-24 RX ADMIN — HEPARIN SODIUM (PORCINE) LOCK FLUSH IV SOLN 100 UNIT/ML 5 ML: 100 SOLUTION at 10:36

## 2024-05-01 NOTE — PROGRESS NOTES
Oncology Follow Up Visit: May 2, 2024    Oncologist: Dr. 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    Oncology HPI:  Berny Estrada is a 73 year old male with medical history including right renal transplant (2014), hypertension, type 2 diabetes with proliferative diabetic retinopathy and long-term insulin use, CKD stage III, CAD, COPD, GERD, colon polyps (tubular adenoma), esophageal candidiasis, cholelithiasis, sleep apnea, hx of tobacco use (quit 50 years ago)  - Presenting complaint of weight loss 10/23-11/23 10-15 lbs, then gain 10 over past few months and significant ECOG decline over the past 6 months, using wheelchair and supplemental oxygen  - 12/1/23- 12/9/23 hospitalized 2/2 anemia, hgb 7.9 w/melena x few months (was on iron)  - 12/6/23 CEA 11.69, 12/7/23 CEA 10  - 12/14/2023 EGD: Patchy white plaques in proximal esophagus and midesophagus suspicious for candidiasis, otherwise normal  - 12/14/2023 [facility] colon[facility]: Fungating partially obstructing, partially circumferential (involving two thirds of the lumen circumference) large mass found in the cecum, including IC valve, with oozing  - Adenocarcinoma, moderately differentiated         - Loss of expression of MLH1 and PMS2; intact expression of MSH2 and MSH6  - MLH1 promoter methylation: POSITIVE   - BRAF V600 mutation positive  - 4 sessile polyps in ascending colon, 3-5 mm in size - Sessile serrated adenoma with focal high-grade dysplasia and Separate minute fragment of adenocarcinoma  - 2 mm sessile polyp in transverse colon - Tubular adenoma; negative for high-grade dysplasia  - 2 sessile polyps in the ascending colon, 4-5 mm in size - Tubular adenoma; negative for high-grade dysplasia  - 6 sessile polyps in the rectosigmoid colon, 3-8 mm in size- Sessile serrated adenoma with focal cytologic dysplasia. No evidence of high-grade dysplasia or invasive  "carcinoma    Treatment:  3/21/24 started infusional 5FU and Leucovorin    Interval History:  Pt is seen in clinic today prior to C4 of 5FU and Leucovorin. He continues to tolerate treatment well with essentially no side effects. He endorses mild constipation but has not needed to treat it. He drinks protein drinks occassionally. His appetite is good and weight has been stable. His wife states he has been slightly less dependent on family for cares and he is moving around a little more than usual. He did not make any attempts to increase his water intake. No additional questions or concerns today.    Patient denies any of the following except if noted above: fevers, chills, difficulty with energy, vision or hearing changes, chest pain, dyspnea, abdominal pain, nausea, vomiting, diarrhea, constipation, urinary concerns, headaches, new aches/pains, numbness, tingling, issues with sleep or mood. He also denies lumps, bumps, rashes or skin lesions, bleeding or bruising issues.    Physical Exam:  BP (!) 143/76 (BP Location: Right arm)   Pulse 74   Temp 97.9  F (36.6  C) (Pulmonary Artery)   Resp 18   Ht 1.778 m (5' 10\")   Wt 105.7 kg (233 lb)   SpO2 97%   BMI 33.43 kg/m       BP Readings from Last 6 Encounters:   05/02/24 (!) 143/76   04/04/24 (!) 153/83   03/21/24 (!) 163/81   02/28/24 (!) 173/89   02/09/24 (!) 160/77   01/18/24 (!) 153/81     Wt Readings from Last 6 Encounters:   05/02/24 105.7 kg (233 lb)   04/18/24 106.3 kg (234 lb 4.8 oz)   04/04/24 106.1 kg (234 lb)   04/04/24 104.8 kg (231 lb)   03/21/24 104.8 kg (231 lb)   02/28/24 98.9 kg (218 lb)      Constitutional: No acute distress, pleasant, appropriately groomed.   ENT: PERRLA, sclera without erythema. Not wearing dentures, a little challenging to decipher speech at times  Neck: Trachea midline, no adenopathy.   Resp: CTA, adequate depth and rate of respirations.   Cardiac: S1/S2, RRR, no murmurs. +2 edema to BLE, wearing compression socks. "   Abdomen: Obese, BS active, abdomen soft and non-tender. No masses or organomegaly.   MS:  Used a wheelchair.  Skin: No rashes, lesions, or wounds on exposed skin.  Neuro: A/O x 4, sensation intact.   Psych: Appropriate mentation and affect.    Laboratory Results:   Results for orders placed or performed in visit on 05/02/24   Comprehensive metabolic panel     Status: Abnormal   Result Value Ref Range    Sodium 138 135 - 145 mmol/L    Potassium 4.7 3.4 - 5.3 mmol/L    Carbon Dioxide (CO2) 27 22 - 29 mmol/L    Anion Gap 10 7 - 15 mmol/L    Urea Nitrogen 32.6 (H) 8.0 - 23.0 mg/dL    Creatinine 1.85 (H) 0.67 - 1.17 mg/dL    GFR Estimate 38 (L) >60 mL/min/1.73m2    Calcium 9.5 8.8 - 10.2 mg/dL    Chloride 101 98 - 107 mmol/L    Glucose 180 (H) 70 - 99 mg/dL    Alkaline Phosphatase 63 40 - 150 U/L    AST 16 0 - 45 U/L    ALT 18 0 - 70 U/L    Protein Total 5.9 (L) 6.4 - 8.3 g/dL    Albumin 3.6 3.5 - 5.2 g/dL    Bilirubin Total 0.8 <=1.2 mg/dL   CBC with platelets and differential     Status: Abnormal   Result Value Ref Range    WBC Count 11.2 (H) 4.0 - 11.0 10e3/uL    RBC Count 3.12 (L) 4.40 - 5.90 10e6/uL    Hemoglobin 10.0 (L) 13.3 - 17.7 g/dL    Hematocrit 31.5 (L) 40.0 - 53.0 %     (H) 78 - 100 fL    MCH 32.1 26.5 - 33.0 pg    MCHC 31.7 31.5 - 36.5 g/dL    RDW 18.0 (H) 10.0 - 15.0 %    Platelet Count 102 (L) 150 - 450 10e3/uL    % Neutrophils 84 %    % Lymphocytes 10 %    % Monocytes 6 %    % Eosinophils 0 %    % Basophils 0 %    % Immature Granulocytes 1 %    NRBCs per 100 WBC 0 <1 /100    Absolute Neutrophils 9.3 (H) 1.6 - 8.3 10e3/uL    Absolute Lymphocytes 1.1 0.8 - 5.3 10e3/uL    Absolute Monocytes 0.6 0.0 - 1.3 10e3/uL    Absolute Eosinophils 0.1 0.0 - 0.7 10e3/uL    Absolute Basophils 0.0 0.0 - 0.2 10e3/uL    Absolute Immature Granulocytes 0.1 <=0.4 10e3/uL    Absolute NRBCs 0.0 10e3/uL   CBC with platelets differential     Status: Abnormal    Narrative    The following orders were created for panel  order CBC with platelets differential.  Procedure                               Abnormality         Status                     ---------                               -----------         ------                     CBC with platelets and d...[[id number]]  Abnormal            Final result                 Please view results for these tests on the individual orders.     I reviewed the above labs today.    Imaging:  CT Chest Abdomen Pelvis w/o Contrast  Narrative: EXAM: CT CHEST ABDOMEN PELVIS W/O CONTRAST  LOCATION: [facility]  DATE: 4/24/2024    INDICATION: Hx of cecal adenocarcinoma on current chemo treatment, needs reevaluation of treatment  COMPARISON: CT abdomen pelvis 12/21/2023  TECHNIQUE: CT scan of the chest, abdomen, and pelvis was performed without IV contrast. Multiplanar reformats were obtained. Dose reduction techniques were used.   CONTRAST: None.    FINDINGS:   LUNGS AND PLEURA: Central airways are patent. Left lower lobe atelectasis/scarring, unchanged. Scattered calcified granulomas and tiny pulmonary nodules are unchanged. For instance 3 mm right middle lobe nodule adjacent to the horizontal fissure series 4   image #129. No new or enlarging lung nodules. Unchanged trace left pleural effusion.    MEDIASTINUM/AXILLAE: Right chest port catheter tip terminates in the right atrium. Stable heart size. No pericardial effusion. Thoracic aorta is nonaneurysmal with severe atheromatous disease. Unchanged calcified hilar lymph nodes from prior   granulomatous disease. No new or enlarging thoracic lymph nodes. Unchanged heterogeneity to the left thyroid without worrisome nodule.    CORONARY ARTERY CALCIFICATION: Severe.    HEPATOBILIARY: Normal unenhanced liver contours. Cholelithiasis without pericholecystic inflammation.    PANCREAS: Unremarkable.    SPLEEN: Unremarkable.    ADRENAL GLANDS: Unremarkable.    KIDNEYS/BLADDER: Atrophy of the native bilateral kidneys with vascular  calcifications. Benign proteinaceous cyst in the left kidney (which requires no follow-up). No urolithiasis or hydronephrosis.    Unchanged mild pelvocaliectasis of the right lower quadrant transplant kidney. Urinary bladder is unremarkable.    BOWEL: Unchanged appearance of the cecal mass (3/448). Remainder of small and large bowel is normal in caliber. No ascites.    LYMPH NODES: Decrease in size of right lower quadrant mesenteric lymph nodes. For instance 10 mm lymph node (3/392) previously measured 18 mm when measured similarly. Slightly increased size of a few mesorectal lymph nodes versus dependent peritoneal   deposits. For instance 12 mm nodules (3/500 and 3/484) both previously measured 9 mm.    VASCULATURE: Abdominal aorta is nonaneurysmal with severe atheromatous disease.    PELVIC ORGANS: Normal contours.    MUSCULOSKELETAL: Degenerative changes throughout the spine. No destructive osseous lesions. Unchanged subcutaneous lesion in the right axilla (3/34), likely a sebaceous or epidermal inclusion cyst.  Impression: IMPRESSION:  1.  Grossly unchanged size/appearance of the cecal mass with decreased adjacent right lower quadrant mesenteric lymph nodes.  2.  Slightly increased size of a few mesorectal lymph nodes versus dependent peritoneal deposits in the pelvis, suspicious for metastatic disease. Continued attention on follow-up is recommended.  3.  No definite new sites of disease in the chest, abdomen or pelvis.  4.  Unchanged mild pelvocaliectasis of the right lower quadrant transplant kidney.  5.  Additional details in the findings.  MR Peritoneum wo & w Contrast  Narrative: Exam: MR PERITONEUM WO & W CONTRAST, 4/24/2024 10:55 AM    Indication: Hx of cecal adenocarcinoma on current chemo treatment,  needs reevaluation of treatment; Malignant neoplasm of cecum (H)    Comparison: 12/21/2023 CT, 1/19/2024 MRI    Technique: Images were acquired with and without intravenous contrast  through the and  pelvis. The following MR images were acquired:  TrueFISP, multiplanar T2 weighted, axial T1 in/out of phase, axial  fat-saturated T1, diffusion-weighted. Multiplanar T1-weighted images  with fat saturation were obtained before contrast administration and  at multiple time points following the administration of intravenous  contrast. Contrast dose: 10mL Gadavist    FINDINGS:    Liver: No hepatic side or iron deposition. No appreciable focal  hepatic lesion, though evaluation is limited by respiratory motion  artifact..     Gallbladder/biliary tree: Cholelithiasis. No gallbladder wall  thickening or pericholecystic fluid. No intra or extrahepatic biliary  dilation.    Spleen: Not enlarged.    Kidneys: Atrophic native kidneys. T1 hyperintense, nonenhancing  hemorrhagic/proteinaceous cyst in the posterior mid pole of the left  kidney. T2 hyperintense, nonenhancing right renal cortical cysts.  Right lower quadrant renal transplant with continued moderate  hydronephrosis upstream of the ureterovesical junction. No suspicious  renal transplant lesion.    Adrenal glands: Normal.    Pancreas: Moderately atrophic pancreas. No main pancreatic ductal  dilation or suspicious mass. Subcentimeter T2 hyperintense cystic foci  in the pancreatic head and body/tail.    Bowel: No dilated small or large bowel. Hypoenhancing cecal mass  measuring approximately 3.6 x 5.1 x 5.2 cm just below the ileocecal  valve, previously 4.2 x 6.5 x 5.9 cm, when measured similarly.    Pelvis: Normal bladder. Mildly enlarged prostate.    Lymph nodes: Decreased size of several enlarged right lower quadrant  mesenteric lymph nodes, the largest measuring 1.3 cm short axis  (series 29 image 85), previously 2.1 cm.    Blood vessels: The major abdominal and pelvic vasculature is patent.    Lung bases: Stable small left pleural effusion with adjacent  compressive atelectasis. Borderline cardiomegaly.    Bones and soft tissues: No aggressive osseous  lesion.    Mesentery and abdominal wall: Continued subcutaneous edema in the left  greater than right abdominal wall. Right lower quadrant abdominal wall  incisional changes with a large incisional hernia. Trace ascites,  greatest in the pericecal region, though decreased since prior.  Slightly increased conspicuity of enhancing soft tissue abnormality  along the posterolateral aspect of the cecum (series 32 images 19-30).  Increased conspicuity of enhancing soft tissue abnormality in the  pelvis between the rectum and bladder (series 32 images 40-27) with  the largest nodule measuring 2.0 x 1.4 x 1.2 cm, previously 1.7 x 1.3  x 1.0 cm.  Impression: IMPRESSION:   1. Decreased cecal mass and associated right lower quadrant mesenteric  lymphadenopathy.  2. 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 Slightly decreased trace pericecal ascites.  3. No appreciable suspicious hepatic lesion, though evaluation is  limited by marked respiratory motion artifact.  4. Right lower quadrant renal transplant with stable moderate  hydronephrosis upstream of the ureteropelvic junction.  5. Cholelithiasis.  6. 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 Recommend attention on  follow-up.    MICHAEL NICOLE,          SYSTEM ID:  [id number]  I reviewed the above imaging report today.        Assessment and Plan:   Adenocarcinoma of the cecum, moderately differentiated, MSI-high (loss of MLH1 and PMS2, MLH1 promoter hypermethylated), BRAF V600 mutation positive, stage 3 with enlarged pericecal mesenteric lymphadenopathy.   - Now on treatment with infusional 5FU with leucovorin, first cycle on 3/21/24  - Continues to tolerate treatment well with minimal side effects.   - Encouraged patient to find small ways to incorporate more movement into his daily routine and drink more  water. Discussed s/s of DVT / PE as patient is high risk given cancer + treatment and sedentary lifestyle.   - Exam and labs reviewed, okay to proceed with C4 today w/o changes.  - Pt scheduled to see Surgery on 5/13 for follow-up.   - Repeat CT CAP completed after cycle 4 with response shown in primary site and LN and CEA is decreasing but there is possible new peritoneal disease b/w the rectum and bladder - Pt follows-up with Dr. Blunt on 5/16.    DM II   - Hyperglycemia s/t steroids, recent discontinuation of IV and PO dexamethasone  - BS now trending 190's versus 400's previously.   - Discussed s/s of hypoglycemia, pt is aware of these symptoms and states he has not had a low episode in a long time    BLE edema  - Overall stable  - [facility] negative for DVT Dec 2023  - Elevating extremities when able, wearing compression socks    Hx of renal transplant  - Previously on tacrolimus and cellcept - both on hold  - Confirmed taking prednisone 40 mg daily  - [facility]      Mercedes Loo - APRN, CNP

## 2024-05-02 ENCOUNTER — INFUSION THERAPY VISIT (OUTPATIENT)
Dept: INFUSION THERAPY | Facility: CLINIC | Age: 74
End: 2024-05-02
Attending: INTERNAL MEDICINE
Payer: MEDICARE

## 2024-05-02 ENCOUNTER — ONCOLOGY VISIT (OUTPATIENT)
Dept: ONCOLOGY | Facility: CLINIC | Age: 74
End: 2024-05-02
Attending: INTERNAL MEDICINE
Payer: MEDICARE

## 2024-05-02 VITALS
HEIGHT: 70 IN | HEART RATE: 74 BPM | DIASTOLIC BLOOD PRESSURE: 76 MMHG | WEIGHT: 233 LBS | SYSTOLIC BLOOD PRESSURE: 143 MMHG | BODY MASS INDEX: 33.36 KG/M2 | RESPIRATION RATE: 18 BRPM | TEMPERATURE: 97.9 F | OXYGEN SATURATION: 97 %

## 2024-05-02 DIAGNOSIS — Z94.0 KIDNEY REPLACED BY TRANSPLANT: ICD-10-CM

## 2024-05-02 DIAGNOSIS — C18.0 MALIGNANT NEOPLASM OF CECUM (H): Primary | ICD-10-CM

## 2024-05-02 DIAGNOSIS — R60.0 BILATERAL LOWER EXTREMITY EDEMA: ICD-10-CM

## 2024-05-02 DIAGNOSIS — R97.8 OTHER ABNORMAL TUMOR MARKERS: ICD-10-CM

## 2024-05-02 DIAGNOSIS — E11.311 TYPE 2 DIABETES MELLITUS WITH RIGHT EYE AFFECTED BY RETINOPATHY AND MACULAR EDEMA, WITHOUT LONG-TERM CURRENT USE OF INSULIN, UNSPECIFIED RETINOPATHY SEVERITY (H): ICD-10-CM

## 2024-05-02 LAB
ALBUMIN SERPL BCG-MCNC: 3.6 G/DL (ref 3.5–5.2)
ALP SERPL-CCNC: 63 U/L (ref 40–150)
ALT SERPL W P-5'-P-CCNC: 18 U/L (ref 0–70)
ANION GAP SERPL CALCULATED.3IONS-SCNC: 10 MMOL/L (ref 7–15)
AST SERPL W P-5'-P-CCNC: 16 U/L (ref 0–45)
BASOPHILS # BLD AUTO: 0 10E3/UL (ref 0–0.2)
BASOPHILS NFR BLD AUTO: 0 %
BILIRUB SERPL-MCNC: 0.8 MG/DL
BUN SERPL-MCNC: 32.6 MG/DL (ref 8–23)
CALCIUM SERPL-MCNC: 9.5 MG/DL (ref 8.8–10.2)
CHLORIDE SERPL-SCNC: 101 MMOL/L (ref 98–107)
CREAT SERPL-MCNC: 1.85 MG/DL (ref 0.67–1.17)
DEPRECATED HCO3 PLAS-SCNC: 27 MMOL/L (ref 22–29)
EGFRCR SERPLBLD CKD-EPI 2021: 38 ML/MIN/1.73M2
EOSINOPHIL # BLD AUTO: 0.1 10E3/UL (ref 0–0.7)
EOSINOPHIL NFR BLD AUTO: 0 %
ERYTHROCYTE [DISTWIDTH] IN BLOOD BY AUTOMATED COUNT: 18 % (ref 10–15)
GLUCOSE SERPL-MCNC: 180 MG/DL (ref 70–99)
HCT VFR BLD AUTO: 31.5 % (ref 40–53)
HGB BLD-MCNC: 10 G/DL (ref 13.3–17.7)
IMM GRANULOCYTES # BLD: 0.1 10E3/UL
IMM GRANULOCYTES NFR BLD: 1 %
LYMPHOCYTES # BLD AUTO: 1.1 10E3/UL (ref 0.8–5.3)
LYMPHOCYTES NFR BLD AUTO: 10 %
MCH RBC QN AUTO: 32.1 PG (ref 26.5–33)
MCHC RBC AUTO-ENTMCNC: 31.7 G/DL (ref 31.5–36.5)
MCV RBC AUTO: 101 FL (ref 78–100)
MONOCYTES # BLD AUTO: 0.6 10E3/UL (ref 0–1.3)
MONOCYTES NFR BLD AUTO: 6 %
NEUTROPHILS # BLD AUTO: 9.3 10E3/UL (ref 1.6–8.3)
NEUTROPHILS NFR BLD AUTO: 84 %
NRBC # BLD AUTO: 0 10E3/UL
NRBC BLD AUTO-RTO: 0 /100
PLATELET # BLD AUTO: 102 10E3/UL (ref 150–450)
POTASSIUM SERPL-SCNC: 4.7 MMOL/L (ref 3.4–5.3)
PROT SERPL-MCNC: 5.9 G/DL (ref 6.4–8.3)
RBC # BLD AUTO: 3.12 10E6/UL (ref 4.4–5.9)
SODIUM SERPL-SCNC: 138 MMOL/L (ref 135–145)
WBC # BLD AUTO: 11.2 10E3/UL (ref 4–11)

## 2024-05-02 PROCEDURE — 250N000011 HC RX IP 250 OP 636: Performed by: INTERNAL MEDICINE

## 2024-05-02 PROCEDURE — 99207 PR NO CHARGE LOS: CPT

## 2024-05-02 PROCEDURE — 99214 OFFICE O/P EST MOD 30 MIN: CPT

## 2024-05-02 PROCEDURE — 85025 COMPLETE CBC W/AUTO DIFF WBC: CPT | Performed by: INTERNAL MEDICINE

## 2024-05-02 PROCEDURE — 86301 IMMUNOASSAY TUMOR CA 19-9: CPT

## 2024-05-02 PROCEDURE — 96375 TX/PRO/DX INJ NEW DRUG ADDON: CPT

## 2024-05-02 PROCEDURE — 36591 DRAW BLOOD OFF VENOUS DEVICE: CPT | Performed by: INTERNAL MEDICINE

## 2024-05-02 PROCEDURE — 86304 IMMUNOASSAY TUMOR CA 125: CPT

## 2024-05-02 PROCEDURE — 258N000003 HC RX IP 258 OP 636: Performed by: INTERNAL MEDICINE

## 2024-05-02 PROCEDURE — 96365 THER/PROPH/DIAG IV INF INIT: CPT | Mod: XU

## 2024-05-02 PROCEDURE — G0463 HOSPITAL OUTPT CLINIC VISIT: HCPCS

## 2024-05-02 PROCEDURE — 80053 COMPREHEN METABOLIC PANEL: CPT | Performed by: INTERNAL MEDICINE

## 2024-05-02 PROCEDURE — G0498 CHEMO EXTEND IV INFUS W/PUMP: HCPCS

## 2024-05-02 RX ORDER — ONDANSETRON 2 MG/ML
8 INJECTION INTRAMUSCULAR; INTRAVENOUS ONCE
Status: COMPLETED | OUTPATIENT
Start: 2024-05-02 | End: 2024-05-02

## 2024-05-02 RX ORDER — HEPARIN SODIUM (PORCINE) LOCK FLUSH IV SOLN 100 UNIT/ML 100 UNIT/ML
500 SOLUTION INTRAVENOUS
Status: DISCONTINUED | OUTPATIENT
Start: 2024-05-02 | End: 2024-05-02 | Stop reason: HOSPADM

## 2024-05-02 RX ADMIN — LEUCOVORIN CALCIUM 800 MG: 350 INJECTION, POWDER, LYOPHILIZED, FOR SOLUTION INTRAMUSCULAR; INTRAVENOUS at 14:32

## 2024-05-02 RX ADMIN — ONDANSETRON 8 MG: 2 INJECTION INTRAMUSCULAR; INTRAVENOUS at 14:24

## 2024-05-02 RX ADMIN — Medication 500 UNITS: at 13:08

## 2024-05-02 RX ADMIN — DEXTROSE MONOHYDRATE 100 ML: 5 INJECTION INTRAVENOUS at 14:30

## 2024-05-02 ASSESSMENT — PAIN SCALES - GENERAL: PAINLEVEL: NO PAIN (0)

## 2024-05-02 NOTE — LETTER
5/2/2024         RE: Berny Estrada  [address] [address]  [place] [address]        Dear Colleague,    Thank you for referring your patient, Berny Estrada, to the [facility]. Please see a copy of my visit note below.    Oncology Follow Up Visit: May 2, 2024    Oncologist: Dr. 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    Oncology HPI:  Berny Estrada is a 73 year old male with medical history including right renal transplant (2014), hypertension, type 2 diabetes with proliferative diabetic retinopathy and long-term insulin use, CKD stage III, CAD, COPD, GERD, colon polyps (tubular adenoma), esophageal candidiasis, cholelithiasis, sleep apnea, hx of tobacco use (quit 50 years ago)  - Presenting complaint of weight loss 10/23-11/23 10-15 lbs, then gain 10 over past few months and significant ECOG decline over the past 6 months, using wheelchair and supplemental oxygen  - 12/1/23- 12/9/23 hospitalized 2/2 anemia, hgb 7.9 w/melena x few months (was on iron)  - 12/6/23 CEA 11.69, 12/7/23 CEA 10  - 12/14/2023 EGD: Patchy white plaques in proximal esophagus and midesophagus suspicious for candidiasis, otherwise normal  - 12/14/2023 [facility]: Fungating partially obstructing, partially circumferential (involving two thirds of the lumen circumference) large mass found in the cecum, including IC valve, with oozing  - Adenocarcinoma, moderately differentiated         - Loss of expression of MLH1 and PMS2; intact expression of MSH2 and MSH6  - MLH1 promoter methylation: POSITIVE   - BRAF V600 mutation positive  - 4 sessile polyps in ascending colon, 3-5 mm in size - Sessile serrated adenoma with focal high-grade dysplasia and Separate minute fragment of adenocarcinoma  - 2 mm sessile polyp in transverse colon - Tubular adenoma; negative for high-grade dysplasia  -  "2 sessile polyps in the ascending colon, 4-5 mm in size - Tubular adenoma; negative for high-grade dysplasia  - 6 sessile polyps in the rectosigmoid colon, 3-8 mm in size- Sessile serrated adenoma with focal cytologic dysplasia. No evidence of high-grade dysplasia or invasive carcinoma    Treatment:  3/21/24 started infusional 5FU and Leucovorin    Interval History:  Pt is seen in clinic today prior to C4 of 5FU and Leucovorin. He continues to tolerate treatment well with essentially no side effects. He endorses mild constipation but has not needed to treat it. He drinks protein drinks occassionally. His appetite is good and weight has been stable. His wife states he has been slightly less dependent on family for cares and he is moving around a little more than usual. He did not make any attempts to increase his water intake. No additional questions or concerns today.    Patient denies any of the following except if noted above: fevers, chills, difficulty with energy, vision or hearing changes, chest pain, dyspnea, abdominal pain, nausea, vomiting, diarrhea, constipation, urinary concerns, headaches, new aches/pains, numbness, tingling, issues with sleep or mood. He also denies lumps, bumps, rashes or skin lesions, bleeding or bruising issues.    Physical Exam:  BP (!) 143/76 (BP Location: Right arm)   Pulse 74   Temp 97.9  F (36.6  C) (Pulmonary Artery)   Resp 18   Ht 1.778 m (5' 10\")   Wt 105.7 kg (233 lb)   SpO2 97%   BMI 33.43 kg/m       BP Readings from Last 6 Encounters:   05/02/24 (!) 143/76   04/04/24 (!) 153/83   03/21/24 (!) 163/81   02/28/24 (!) 173/89   02/09/24 (!) 160/77   01/18/24 (!) 153/81     Wt Readings from Last 6 Encounters:   05/02/24 105.7 kg (233 lb)   04/18/24 106.3 kg (234 lb 4.8 oz)   04/04/24 106.1 kg (234 lb)   04/04/24 104.8 kg (231 lb)   03/21/24 104.8 kg (231 lb)   02/28/24 98.9 kg (218 lb)      Constitutional: No acute distress, pleasant, appropriately groomed.   ENT: MELYSSA, " sclera without erythema. Not wearing dentures, a little challenging to decipher speech at times  Neck: Trachea midline, no adenopathy.   Resp: CTA, adequate depth and rate of respirations.   Cardiac: S1/S2, RRR, no murmurs. +2 edema to BLE, wearing compression socks.   Abdomen: Obese, BS active, abdomen soft and non-tender. No masses or organomegaly.   MS:  Used a wheelchair.  Skin: No rashes, lesions, or wounds on exposed skin.  Neuro: A/O x 4, sensation intact.   Psych: Appropriate mentation and affect.    Laboratory Results:   Results for orders placed or performed in visit on 05/02/24   Comprehensive metabolic panel     Status: Abnormal   Result Value Ref Range    Sodium 138 135 - 145 mmol/L    Potassium 4.7 3.4 - 5.3 mmol/L    Carbon Dioxide (CO2) 27 22 - 29 mmol/L    Anion Gap 10 7 - 15 mmol/L    Urea Nitrogen 32.6 (H) 8.0 - 23.0 mg/dL    Creatinine 1.85 (H) 0.67 - 1.17 mg/dL    GFR Estimate 38 (L) >60 mL/min/1.73m2    Calcium 9.5 8.8 - 10.2 mg/dL    Chloride 101 98 - 107 mmol/L    Glucose 180 (H) 70 - 99 mg/dL    Alkaline Phosphatase 63 40 - 150 U/L    AST 16 0 - 45 U/L    ALT 18 0 - 70 U/L    Protein Total 5.9 (L) 6.4 - 8.3 g/dL    Albumin 3.6 3.5 - 5.2 g/dL    Bilirubin Total 0.8 <=1.2 mg/dL   CBC with platelets and differential     Status: Abnormal   Result Value Ref Range    WBC Count 11.2 (H) 4.0 - 11.0 10e3/uL    RBC Count 3.12 (L) 4.40 - 5.90 10e6/uL    Hemoglobin 10.0 (L) 13.3 - 17.7 g/dL    Hematocrit 31.5 (L) 40.0 - 53.0 %     (H) 78 - 100 fL    MCH 32.1 26.5 - 33.0 pg    MCHC 31.7 31.5 - 36.5 g/dL    RDW 18.0 (H) 10.0 - 15.0 %    Platelet Count 102 (L) 150 - 450 10e3/uL    % Neutrophils 84 %    % Lymphocytes 10 %    % Monocytes 6 %    % Eosinophils 0 %    % Basophils 0 %    % Immature Granulocytes 1 %    NRBCs per 100 WBC 0 <1 /100    Absolute Neutrophils 9.3 (H) 1.6 - 8.3 10e3/uL    Absolute Lymphocytes 1.1 0.8 - 5.3 10e3/uL    Absolute Monocytes 0.6 0.0 - 1.3 10e3/uL    Absolute  Eosinophils 0.1 0.0 - 0.7 10e3/uL    Absolute Basophils 0.0 0.0 - 0.2 10e3/uL    Absolute Immature Granulocytes 0.1 <=0.4 10e3/uL    Absolute NRBCs 0.0 10e3/uL   CBC with platelets differential     Status: Abnormal    Narrative    The following orders were created for panel order CBC with platelets differential.  Procedure                               Abnormality         Status                     ---------                               -----------         ------                     CBC with platelets and d...[[id number]]  Abnormal            Final result                 Please view results for these tests on the individual orders.     I reviewed the above labs today.    Imaging:  CT Chest Abdomen Pelvis w/o Contrast  Narrative: EXAM: CT CHEST ABDOMEN PELVIS W/O CONTRAST  LOCATION: [facility]  DATE: 4/24/2024    INDICATION: Hx of cecal adenocarcinoma on current chemo treatment, needs reevaluation of treatment  COMPARISON: CT abdomen pelvis 12/21/2023  TECHNIQUE: CT scan of the chest, abdomen, and pelvis was performed without IV contrast. Multiplanar reformats were obtained. Dose reduction techniques were used.   CONTRAST: None.    FINDINGS:   LUNGS AND PLEURA: Central airways are patent. Left lower lobe atelectasis/scarring, unchanged. Scattered calcified granulomas and tiny pulmonary nodules are unchanged. For instance 3 mm right middle lobe nodule adjacent to the horizontal fissure series 4   image #129. No new or enlarging lung nodules. Unchanged trace left pleural effusion.    MEDIASTINUM/AXILLAE: Right chest port catheter tip terminates in the right atrium. Stable heart size. No pericardial effusion. Thoracic aorta is nonaneurysmal with severe atheromatous disease. Unchanged calcified hilar lymph nodes from prior   granulomatous disease. No new or enlarging thoracic lymph nodes. Unchanged heterogeneity to the left thyroid without worrisome nodule.    CORONARY ARTERY CALCIFICATION:  Severe.    HEPATOBILIARY: Normal unenhanced liver contours. Cholelithiasis without pericholecystic inflammation.    PANCREAS: Unremarkable.    SPLEEN: Unremarkable.    ADRENAL GLANDS: Unremarkable.    KIDNEYS/BLADDER: Atrophy of the native bilateral kidneys with vascular calcifications. Benign proteinaceous cyst in the left kidney (which requires no follow-up). No urolithiasis or hydronephrosis.    Unchanged mild pelvocaliectasis of the right lower quadrant transplant kidney. Urinary bladder is unremarkable.    BOWEL: Unchanged appearance of the cecal mass (3/448). Remainder of small and large bowel is normal in caliber. No ascites.    LYMPH NODES: Decrease in size of right lower quadrant mesenteric lymph nodes. For instance 10 mm lymph node (3/392) previously measured 18 mm when measured similarly. Slightly increased size of a few mesorectal lymph nodes versus dependent peritoneal   deposits. For instance 12 mm nodules (3/500 and 3/484) both previously measured 9 mm.    VASCULATURE: Abdominal aorta is nonaneurysmal with severe atheromatous disease.    PELVIC ORGANS: Normal contours.    MUSCULOSKELETAL: Degenerative changes throughout the spine. No destructive osseous lesions. Unchanged subcutaneous lesion in the right axilla (3/34), likely a sebaceous or epidermal inclusion cyst.  Impression: IMPRESSION:  1.  Grossly unchanged size/appearance of the cecal mass with decreased adjacent right lower quadrant mesenteric lymph nodes.  2.  Slightly increased size of a few mesorectal lymph nodes versus dependent peritoneal deposits in the pelvis, suspicious for metastatic disease. Continued attention on follow-up is recommended.  3.  No definite new sites of disease in the chest, abdomen or pelvis.  4.  Unchanged mild pelvocaliectasis of the right lower quadrant transplant kidney.  5.  Additional details in the findings.  MR Peritoneum wo & w Contrast  Narrative: Exam: MR PERITONEUM WO & W CONTRAST, 4/24/2024 10:55  AM    Indication: Hx of cecal adenocarcinoma on current chemo treatment,  needs reevaluation of treatment; Malignant neoplasm of cecum (H)    Comparison: 12/21/2023 CT, 1/19/2024 MRI    Technique: Images were acquired with and without intravenous contrast  through the and pelvis. The following MR images were acquired:  TrueFISP, multiplanar T2 weighted, axial T1 in/out of phase, axial  fat-saturated T1, diffusion-weighted. Multiplanar T1-weighted images  with fat saturation were obtained before contrast administration and  at multiple time points following the administration of intravenous  contrast. Contrast dose: 10mL Gadavist    FINDINGS:    Liver: No hepatic side or iron deposition. No appreciable focal  hepatic lesion, though evaluation is limited by respiratory motion  artifact..     Gallbladder/biliary tree: Cholelithiasis. No gallbladder wall  thickening or pericholecystic fluid. No intra or extrahepatic biliary  dilation.    Spleen: Not enlarged.    Kidneys: Atrophic native kidneys. T1 hyperintense, nonenhancing  hemorrhagic/proteinaceous cyst in the posterior mid pole of the left  kidney. T2 hyperintense, nonenhancing right renal cortical cysts.  Right lower quadrant renal transplant with continued moderate  hydronephrosis upstream of the ureterovesical junction. No suspicious  renal transplant lesion.    Adrenal glands: Normal.    Pancreas: Moderately atrophic pancreas. No main pancreatic ductal  dilation or suspicious mass. Subcentimeter T2 hyperintense cystic foci  in the pancreatic head and body/tail.    Bowel: No dilated small or large bowel. Hypoenhancing cecal mass  measuring approximately 3.6 x 5.1 x 5.2 cm just below the ileocecal  valve, previously 4.2 x 6.5 x 5.9 cm, when measured similarly.    Pelvis: Normal bladder. Mildly enlarged prostate.    Lymph nodes: Decreased size of several enlarged right lower quadrant  mesenteric lymph nodes, the largest measuring 1.3 cm short axis  (series 29 image  85), previously 2.1 cm.    Blood vessels: The major abdominal and pelvic vasculature is patent.    Lung bases: Stable small left pleural effusion with adjacent  compressive atelectasis. Borderline cardiomegaly.    Bones and soft tissues: No aggressive osseous lesion.    Mesentery and abdominal wall: Continued subcutaneous edema in the left  greater than right abdominal wall. Right lower quadrant abdominal wall  incisional changes with a large incisional hernia. Trace ascites,  greatest in the pericecal region, though decreased since prior.  Slightly increased conspicuity of enhancing soft tissue abnormality  along the posterolateral aspect of the cecum (series 32 images 19-30).  Increased conspicuity of enhancing soft tissue abnormality in the  pelvis between the rectum and bladder (series 32 images 40-27) with  the largest nodule measuring 2.0 x 1.4 x 1.2 cm, previously 1.7 x 1.3  x 1.0 cm.  Impression: IMPRESSION:   1. Decreased cecal mass and associated right lower quadrant mesenteric  lymphadenopathy.  2. 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 Slightly decreased trace pericecal ascites.  3. No appreciable suspicious hepatic lesion, though evaluation is  limited by marked respiratory motion artifact.  4. Right lower quadrant renal transplant with stable moderate  hydronephrosis upstream of the ureteropelvic junction.  5. Cholelithiasis.  6. 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 Recommend attention on  follow-up.    MICHAEL NICOLE DO         SYSTEM ID:  [id number]  I reviewed the above imaging report today.        Assessment and Plan:   Adenocarcinoma of the cecum, moderately differentiated, MSI-high (loss of MLH1 and PMS2, MLH1 promoter hypermethylated), BRAF V600 mutation positive, stage 3 with enlarged pericecal mesenteric  lymphadenopathy.   - Now on treatment with infusional 5FU with leucovorin, first cycle on 3/21/24  - Continues to tolerate treatment well with minimal side effects.   - Encouraged patient to find small ways to incorporate more movement into his daily routine and drink more water. Discussed s/s of DVT / PE as patient is high risk given cancer + treatment and sedentary lifestyle.   - Exam and labs reviewed, okay to proceed with C4 today w/o changes.  - Pt scheduled to see Surgery on 5/13 for follow-up.   - Repeat CT CAP completed after cycle 4 with response shown in primary site and LN and CEA is decreasing but there is possible new peritoneal disease b/w the rectum and bladder - Pt follows-up with Dr. Blunt on 5/16.    DM II   - Hyperglycemia s/t steroids, recent discontinuation of IV and PO dexamethasone  - BS now trending 190's versus 400's previously.   - Discussed s/s of hypoglycemia, pt is aware of these symptoms and states he has not had a low episode in a long time    BLE edema  - Overall stable  - [facility] 2023  - Elevating extremities when able, wearing compression socks    Hx of renal transplant  - Previously on tacrolimus and cellcept - both on hold  - Confirmed taking prednisone 40 mg daily  - [facility]      Mercedes Loo - ONI, CNP      Again, thank you for allowing me to participate in the care of your patient.        Sincerely,        ONI Bennett CNP

## 2024-05-02 NOTE — PROGRESS NOTES
"Infusion Nursing Note:  Berny Estrada presents today for Leucovorin & 5FU pump connect.    Patient seen by provider today: No   present during visit today: Not Applicable.    Note: Patient reports feeling well overall today. Denies new medical concerns.    Intravenous Access:  Implanted Port.    Treatment Conditions:  Lab Results   Component Value Date    HGB 10.0 (L) 05/02/2024    WBC 11.2 (H) 05/02/2024    ANEUTAUTO 9.3 (H) 05/02/2024     (L) 05/02/2024        Lab Results   Component Value Date     05/02/2024    POTASSIUM 4.7 05/02/2024    CR 1.85 (H) 05/02/2024    VIVI 9.5 05/02/2024    BILITOTAL 0.8 05/02/2024    ALBUMIN 3.6 05/02/2024    ALT 18 05/02/2024    AST 16 05/02/2024       Results reviewed, labs MET treatment parameters, ok to proceed with treatment.    Post Infusion Assessment:  Patient tolerated infusion without incident.  Blood return noted pre and post infusion.  Site patent and intact, free from redness, edema or discomfort.  No evidence of extravasations.  Prior to discharge: Port is secured in place with tegaderm and flushed with 10cc NS with positive blood return noted.    Continuous home infusion Dosi-Fuser pump connected.    All connectors secured in place and clamps taped open.    Pump started, \"running\" noted on display (CADD): Not Applicable.   Capillary element taped to pt's skin per protocol.  Pump Connection double checked with Mini MIRANDA RN.  Patient instructed to call our clinic or [facility] with any questions or concerns at home.  Patient verbalized understanding.    Patient set up for pump disconnect at home with [facility] on 5/4/24 at 1 PM. Confirmed via IB by Layla GROSS RN.     Writer received message from Layla GROSS RN with hospitals that IV fluids are not covered for patient at home. Reviewed with Mercedes Loo CNP that patient has not been receiving fluids on disconnect day and she stated that we were OK to continue with the pump " disconnect and no IVF at home. This info was relayed to [facility] and patient will be disconnected at home without IV fluids.       Discharge Plan:   Future appts have been reviewed and crosschecked with appt note and plan.  AVS to patient via [employer].  Patient will return 5/16/24 for next appointment.   Patient discharged in stable condition accompanied by: wife.  Departure Mode: Wheelchair.      Tea Vazquez RN BSN OCN

## 2024-05-02 NOTE — PROGRESS NOTES
Port needle left for access for treatment. Sterile technique performed and maintained. Patient tolerated procedure well. Tubes drawn in rainbow order. Tubes labeled and double signed. Transparent dressing placed with use of tegaderm.     Jennifer Bradford RN  [facility] Oncology/Infusion Loop

## 2024-05-02 NOTE — NURSING NOTE
"Oncology Rooming Note    May 2, 2024 1:29 PM   Berny Estrada is a 74 year old male who presents for:    Chief Complaint   Patient presents with    Oncology Clinic Visit     Prior to treatment       Initial Vitals: BP (!) 143/76 (BP Location: Right arm)   Pulse 74   Temp 97.9  F (36.6  C) (Pulmonary Artery)   Resp 18   Ht 1.778 m (5' 10\")   Wt 105.7 kg (233 lb)   SpO2 97%   BMI 33.43 kg/m   Estimated body mass index is 33.43 kg/m  as calculated from the following:    Height as of this encounter: 1.778 m (5' 10\").    Weight as of this encounter: 105.7 kg (233 lb). Body surface area is 2.28 meters squared.  No Pain (0) Comment: Data Unavailable   No LMP for male patient.  Allergies reviewed: Yes  Medications reviewed: Yes    Medications: Medication refills not needed today.  Pharmacy name entered into EPIC: [facility] - [place], [place] - [address]    Frailty Screening:   Is the patient here for a new oncology consult visit in cancer care? 2. No      Clinical concerns: No new concerns         Chantal Murphy LPN              "

## 2024-05-03 NOTE — PROGRESS NOTES
Colon and Rectal Surgery Clinic Note    RE: Berny Estrada.  : 1950.  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    HPI:   Original stagin-year-old male who [facility]urt[facility] for [facility].      Pathology consult here at [facility]:  1.  MID ESOPHAGUS, BIOPSY:  - Squamous mucosa with superficial colonization by fungal organisms positive for PAS stain, morphologically compatible with Candida species     2.  COLON, CECUM MASS, BIOPSY:  - Adenocarcinoma, moderately differentiated  - Loss of expression of MLH1 and PMS2; intact expression of MSH2 and MSH6 (see comment)     3.  ASCENDING COLON, POLYPECTOMY:  - Sessile serrated adenoma with focal high-grade dysplasia  - Separate minute fragment of adenocarcinoma     4.  TRANSVERSE COLON, POLYPECTOMY:  - Tubular adenoma; negative for high-grade dysplasia     5.  DESCENDING COLON, POLYPECTOMY:  - Tubular adenoma; negative for high-grade dysplasia     6.  RECTOSIGMOID COLON, POLYPECTOMY:  - Sessile serrated adenoma with focal cytologic dysplasia  - No evidence of high-grade dysplasia or invasive carcinoma  CT CAP 23:  IMPRESSION:   1. Cecal mass with enlarged nodes of the right lower quadrant  including a 2.1 x 1.7 cm lymph node, which are suspicious for  metastatic disease. Indeterminate thickening and nodularity of the  peritoneal surfaces near the cecal mass could be due to fluid or  mesenteric spread of disease.  3. Indeterminate 1.6 cm lesion of the left native kidney. Consider  dedicated renal MRI with contrast for further characterization.  4. Right lower quadrant renal transplant with mild hydronephrosis.  5. Ill-defined groundglass opacities of the left upper lobe and  lingula suspicious for an infectious/inflammatory etiology. Additional  bronchiectatic and fibroatelectatic changes of the right  middle lobe  and lingula suggesting a chronic inflammatory process.  6. Small left-sided pleural effusion.  7. Dilated main pulmonary artery as can be seen with pulmonary  arterial hypertension.  8. Cholelithiasis without findings to suggest acute cholecystitis.  CEA 31.3.  PMH: kidney transplant (2014), thrombocytopenia, DM II, HTN, COPD, CKD 3.  Apart from his kidney transplant, denies any previous abdominal or anorectal operations.  Apart from his iron deficiency anemia, he was not having any abdominal or GI symptoms.  Denies blood per rectum.  Family history significant for colon and prostate cancer in his brother.  The rest of his family history history is largely unknown.  He did have a previous colonoscopy right before his kidney transplant at [facility] and this was normal per the patient.  His overall health has declined over the last 6 months where he is currently in a wheelchair but does not use this all the time but is only able to ambulate 20-30 steps at most without becoming short of breath.  He is also on home oxygen.  Loss of expression of MLH1 and PMS2; intact expression of MSH2 and MSH6  MLH1 promoter methylation: POSITIVE   BRAF V600 mutation positive  Treatment:  3/21/24 started infusional 5FU and Leucovorin.  Has received 4 cycles so far with the last 1 on 5/2/2024.    Follow Up:  CT CAP 4/24/24:  IMPRESSION:  1.  Grossly unchanged size/appearance of the cecal mass with decreased adjacent right lower quadrant mesenteric lymph nodes.  2.  Slightly increased size of a few mesorectal lymph nodes versus dependent peritoneal deposits in the pelvis, suspicious for metastatic disease. Continued attention on follow-up is recommended.  3.  No definite new sites of disease in the chest, abdomen or pelvis.  4.  Unchanged mild pelvocaliectasis of the right lower quadrant transplant kidney.  5.  Additional details in the findings.  MR Peritoneum 4/24/24:  IMPRESSION:  1.  Grossly unchanged size/appearance of the  cecal mass with decreased adjacent right lower quadrant mesenteric lymph nodes.  2.  Slightly increased size of a few mesorectal lymph nodes versus dependent peritoneal deposits in the pelvis, suspicious for metastatic disease. Continued attention on follow-up is recommended.  3.  No definite new sites of disease in the chest, abdomen or pelvis.  4.  Unchanged mild pelvocaliectasis of the right lower quadrant transplant kidney.  5.  Additional details in the findings.     12/21/23 15:17 01/18/24 12:31 5/2/204  13:07     9 11   Cancer Antigen 19-9  <2 <2   CEA 31.3 48.5 24.5     Patient was admitted to [facility] (unknown admit date but FL'ed 5/13/24) for massive pulmonary embolism.  Berny says that he tolerated systemic chemotherapy quite well.  [facility]  He is currently on Eliquis.  His breathing is nonlabored and he is not on supplemental oxygen.  Denies significant changes in bowel habits.    Physical Examination:  /72 (BP Location: Right arm, Patient Position: Sitting, Cuff Size: Adult Large)   Pulse 79   SpO2 98%    General: Well hydrated. No acute distress.    ASSESSMENT  74-year-old male with significant comorbidities and poor functional status with recently diagnosed and biopsy-proven MSI cecal adenocarcinoma (MLH1 and PMS2; MLH1 promoter methylation: POSITIVE) with initial radiologic evidence of lymph node metastases as well as peritoneal carcinomatosis.  No clear evidence of extra-abdominal metastatic disease.  Tolerated 4 cycles of systemic chemotherapy relatively well.  Interval staging shows decreased regional lymphadenopathy and stable primary tumor site although increased in size of peritoneal deposits.  CEA also decreased but still elevated.  Unfortunately he just had a massive PE.  We discussed the role and impact of cytoreductive surgery with hyperthermic intraperitoneal chemotherapy for metastatic colorectal cancer, including the potential need for  several cycles of preoperative chemotherapy, intraoperative findings that may limit or preclude the extent of resection and delivery of intraperitoneal chemotherapy, postoperative complications and sequelae, likelihood of recurrent cancer, and quality of life. We also discussed recent grade IA data (Prodige 7 trial) showing no significant differences in overall survival with cytoreductive surgery with hyperthermic intraperitoneal chemotherapy versus cytoreductive surgery alone.     PLAN  Would not recommend surgery at this time.  I would recommend a minimum of 3-6 months after his PE before entertaining surgery.  Would recommend restarting systemic chemotherapy.  He is meeting with Dr. Blunt later this week.  Repeat imaging and tumor markers after proposed systemic chemotherapy regimen to assess primary and metastatic disease.    30 minutes spent on the date of encounter performing chart review, history and exam, documentation.     Mitchell Reid MD, MSc, FACS, FASCRS.    Chief, Division of Colon & Rectal Surgery  Stanley M. Goldberg, MD Endowed Chair, Colon & Rectal Surgery  Department of Surgery  [facility]      Referring Provider:  Lluvia Kelley MD  [facility] [address]  [place] [address]     Primary Care Provider:  Cat Goodson    CC:  Stacey Blunt DO Melissa Borthwick, MD

## 2024-05-06 DIAGNOSIS — C18.0 MALIGNANT NEOPLASM OF CECUM (H): Primary | ICD-10-CM

## 2024-05-06 DIAGNOSIS — R97.8 OTHER ABNORMAL TUMOR MARKERS: ICD-10-CM

## 2024-05-06 LAB — CANCER AG125 SERPL-ACNC: 11 U/ML

## 2024-05-07 LAB — CANCER AG19-9 SERPL IA-ACNC: <2 U/ML

## 2024-05-08 ENCOUNTER — LAB REQUISITION (OUTPATIENT)
Dept: LAB | Facility: CLINIC | Age: 74
End: 2024-05-08

## 2024-05-08 LAB
INR (EXTERNAL): 1.1 (ref 0.8–1.1)
TACROLIMUS BLD-MCNC: <1 UG/L (ref 5–15)
TME LAST DOSE: ABNORMAL H
TME LAST DOSE: ABNORMAL H

## 2024-05-08 PROCEDURE — 80197 ASSAY OF TACROLIMUS: CPT

## 2024-05-13 ENCOUNTER — OFFICE VISIT (OUTPATIENT)
Dept: SURGERY | Facility: CLINIC | Age: 74
End: 2024-05-13
Payer: MEDICARE

## 2024-05-13 VITALS — DIASTOLIC BLOOD PRESSURE: 72 MMHG | HEART RATE: 79 BPM | SYSTOLIC BLOOD PRESSURE: 132 MMHG | OXYGEN SATURATION: 98 %

## 2024-05-13 DIAGNOSIS — C18.0 MALIGNANT NEOPLASM OF CECUM (H): Primary | ICD-10-CM

## 2024-05-13 PROCEDURE — 99214 OFFICE O/P EST MOD 30 MIN: CPT | Performed by: COLON & RECTAL SURGERY

## 2024-05-13 ASSESSMENT — PAIN SCALES - GENERAL: PAINLEVEL: NO PAIN (0)

## 2024-05-13 NOTE — PATIENT INSTRUCTIONS
Follow up:    Please call with any questions or concerns regarding your clinic visit today.    It is a pleasure being involved in your health care.    Contacts post-consultation depending on your need:    Radiology Appointments [phone]    Schedule Clinic Appointments [phone] # 1   M-F 7:30 - 5 pm    [facility] [phone]    Clinic Fax Number [phone]    Surgery Scheduling [phone]    My Chart is available 24 hours a day and is a secure way to access your records and communicate with your care team.  I strongly recommend signing up if you haven't already done so, if you are comfortable with computers.  If you would like to inquire about this or are having problems with My Chart access, you may call [phone] or go online at [email or web address].  Please allow at least 24 hours for a response and extra time on weekends and Holidays.

## 2024-05-13 NOTE — LETTER
2024       RE: Berny Estrada  [address] [address]  [place] [address]     Dear Colleague,    Thank you for referring your patient, Berny Estrada, to the [facility] COLON [facility]IN[facility] [place] at [facility]. Please see a copy of my visit note below.    Colon and Rectal Surgery Clinic Note    RE: Berny Estrada.  : 1950.  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    HPI:   Original stagin-year-old male who [facility]urtado [facility].      Pathology consult here at [facility]:  1.  MID ESOPHAGUS, BIOPSY:  - Squamous mucosa with superficial colonization by fungal organisms positive for PAS stain, morphologically compatible with Candida species     2.  COLON, CECUM MASS, BIOPSY:  - Adenocarcinoma, moderately differentiated  - Loss of expression of MLH1 and PMS2; intact expression of MSH2 and MSH6 (see comment)     3.  ASCENDING COLON, POLYPECTOMY:  - Sessile serrated adenoma with focal high-grade dysplasia  - Separate minute fragment of adenocarcinoma     4.  TRANSVERSE COLON, POLYPECTOMY:  - Tubular adenoma; negative for high-grade dysplasia     5.  DESCENDING COLON, POLYPECTOMY:  - Tubular adenoma; negative for high-grade dysplasia     6.  RECTOSIGMOID COLON, POLYPECTOMY:  - Sessile serrated adenoma with focal cytologic dysplasia  - No evidence of high-grade dysplasia or invasive carcinoma  CT CAP 23:  IMPRESSION:   1. Cecal mass with enlarged nodes of the right lower quadrant  including a 2.1 x 1.7 cm lymph node, which are suspicious for  metastatic disease. Indeterminate thickening and nodularity of the  peritoneal surfaces near the cecal mass could be due to fluid or  mesenteric spread of disease.  3. Indeterminate 1.6 cm lesion of the left  native kidney. Consider  dedicated renal MRI with contrast for further characterization.  4. Right lower quadrant renal transplant with mild hydronephrosis.  5. Ill-defined groundglass opacities of the left upper lobe and  lingula suspicious for an infectious/inflammatory etiology. Additional  bronchiectatic and fibroatelectatic changes of the right middle lobe  and lingula suggesting a chronic inflammatory process.  6. Small left-sided pleural effusion.  7. Dilated main pulmonary artery as can be seen with pulmonary  arterial hypertension.  8. Cholelithiasis without findings to suggest acute cholecystitis.  CEA 31.3.  PMH: kidney transplant (2014), thrombocytopenia, DM II, HTN, COPD, CKD 3.  Apart from his kidney transplant, denies any previous abdominal or anorectal operations.  Apart from his iron deficiency anemia, he was not having any abdominal or GI symptoms.  Denies blood per rectum.  Family history significant for colon and prostate cancer in his brother.  The rest of his family history history is largely unknown.  He did have a previous colonoscopy right before his kidney transplant at [facility] and this was normal per the patient.  His overall health has declined over the last 6 months where he is currently in a wheelchair but does not use this all the time but is only able to ambulate 20-30 steps at most without becoming short of breath.  He is also on home oxygen.  Loss of expression of MLH1 and PMS2; intact expression of MSH2 and MSH6  MLH1 promoter methylation: POSITIVE   BRAF V600 mutation positive  Treatment:  3/21/24 started infusional 5FU and Leucovorin.  Has received 4 cycles so far with the last 1 on 5/2/2024.    Follow Up:  CT CAP 4/24/24:  IMPRESSION:  1.  Grossly unchanged size/appearance of the cecal mass with decreased adjacent right lower quadrant mesenteric lymph nodes.  2.  Slightly increased size of a few mesorectal lymph nodes versus dependent peritoneal deposits in the pelvis, suspicious  for metastatic disease. Continued attention on follow-up is recommended.  3.  No definite new sites of disease in the chest, abdomen or pelvis.  4.  Unchanged mild pelvocaliectasis of the right lower quadrant transplant kidney.  5.  Additional details in the findings.  MR Peritoneum 4/24/24:  IMPRESSION:  1.  Grossly unchanged size/appearance of the cecal mass with decreased adjacent right lower quadrant mesenteric lymph nodes.  2.  Slightly increased size of a few mesorectal lymph nodes versus dependent peritoneal deposits in the pelvis, suspicious for metastatic disease. Continued attention on follow-up is recommended.  3.  No definite new sites of disease in the chest, abdomen or pelvis.  4.  Unchanged mild pelvocaliectasis of the right lower quadrant transplant kidney.  5.  Additional details in the findings.     12/21/23 15:17 01/18/24 12:31 5/2/204  13:07     9 11   Cancer Antigen 19-9  <2 <2   CEA 31.3 48.5 24.5     Patient was admitted to [facility] (unknown admit date but PR'ed 5/13/24) for massive pulmonary embolism.  Berny says that he tolerated systemic chemotherapy quite well.  [facility]  He is currently on Eliquis.  His breathing is nonlabored and he is not on supplemental oxygen.  Denies significant changes in bowel habits.    Physical Examination:  /72 (BP Location: Right arm, Patient Position: Sitting, Cuff Size: Adult Large)   Pulse 79   SpO2 98%    General: Well hydrated. No acute distress.    ASSESSMENT  74-year-old male with significant comorbidities and poor functional status with recently diagnosed and biopsy-proven MSI cecal adenocarcinoma (MLH1 and PMS2; MLH1 promoter methylation: POSITIVE) with initial radiologic evidence of lymph node metastases as well as peritoneal carcinomatosis.  No clear evidence of extra-abdominal metastatic disease.  Tolerated 4 cycles of systemic chemotherapy relatively well.  Interval staging shows decreased regional  lymphadenopathy and stable primary tumor site although increased in size of peritoneal deposits.  CEA also decreased but still elevated.  Unfortunately he just had a massive PE.  We discussed the role and impact of cytoreductive surgery with hyperthermic intraperitoneal chemotherapy for metastatic colorectal cancer, including the potential need for several cycles of preoperative chemotherapy, intraoperative findings that may limit or preclude the extent of resection and delivery of intraperitoneal chemotherapy, postoperative complications and sequelae, likelihood of recurrent cancer, and quality of life. We also discussed recent grade IA data (Prodige 7 trial) showing no significant differences in overall survival with cytoreductive surgery with hyperthermic intraperitoneal chemotherapy versus cytoreductive surgery alone.     PLAN  Would not recommend surgery at this time.  I would recommend a minimum of 3-6 months after his PE before entertaining surgery.  Would recommend restarting systemic chemotherapy.  He is meeting with Dr. Blunt later this week.  Repeat imaging and tumor markers after proposed systemic chemotherapy regimen to assess primary and metastatic disease.    30 minutes spent on the date of encounter performing chart review, history and exam, documentation.     Mitchell Reid MD, MSc, FACS, FASCRS.    Chief, Division of Colon & Rectal Surgery  Stanley M. Goldberg, MD Endowed Chair, Colon & Rectal Surgery  Department of Surgery  [facility]      Referring Provider:  Lluvia Kelley MD  [facility] [address]  [place] [address]     Primary Care Provider:  Cat Goodson        Again, thank you for allowing me to participate in the care of your patient.      Sincerely,    Mitchell Reid MD

## 2024-05-13 NOTE — NURSING NOTE
No chief complaint on file.      Vitals:    05/13/24 1207   BP: 132/72   BP Location: Right arm   Patient Position: Sitting   Cuff Size: Adult Large   Pulse: 79   SpO2: 98%       There is no height or weight on file to calculate BMI.    Reta Ferris, CMA

## 2024-05-16 ENCOUNTER — LAB (OUTPATIENT)
Dept: INFUSION THERAPY | Facility: CLINIC | Age: 74
End: 2024-05-16
Attending: INTERNAL MEDICINE
Payer: MEDICARE

## 2024-05-16 ENCOUNTER — ONCOLOGY VISIT (OUTPATIENT)
Dept: ONCOLOGY | Facility: CLINIC | Age: 74
End: 2024-05-16
Attending: INTERNAL MEDICINE
Payer: MEDICARE

## 2024-05-16 VITALS
HEART RATE: 79 BPM | BODY MASS INDEX: 32.21 KG/M2 | HEIGHT: 70 IN | DIASTOLIC BLOOD PRESSURE: 72 MMHG | WEIGHT: 225 LBS | SYSTOLIC BLOOD PRESSURE: 143 MMHG | OXYGEN SATURATION: 98 %

## 2024-05-16 DIAGNOSIS — T45.1X5A ANTINEOPLASTIC CHEMOTHERAPY INDUCED ANEMIA: ICD-10-CM

## 2024-05-16 DIAGNOSIS — D64.9 ANEMIA, UNSPECIFIED TYPE: ICD-10-CM

## 2024-05-16 DIAGNOSIS — C18.0 MALIGNANT NEOPLASM OF CECUM (H): Primary | ICD-10-CM

## 2024-05-16 DIAGNOSIS — D64.81 ANTINEOPLASTIC CHEMOTHERAPY INDUCED ANEMIA: ICD-10-CM

## 2024-05-16 DIAGNOSIS — C18.0 MALIGNANT NEOPLASM OF CECUM (H): ICD-10-CM

## 2024-05-16 DIAGNOSIS — T45.1X5A ANTINEOPLASTIC CHEMOTHERAPY INDUCED ANEMIA: Primary | ICD-10-CM

## 2024-05-16 DIAGNOSIS — E11.311 TYPE 2 DIABETES MELLITUS WITH RIGHT EYE AFFECTED BY RETINOPATHY AND MACULAR EDEMA, WITHOUT LONG-TERM CURRENT USE OF INSULIN, UNSPECIFIED RETINOPATHY SEVERITY (H): ICD-10-CM

## 2024-05-16 DIAGNOSIS — E87.5 HYPERKALEMIA: ICD-10-CM

## 2024-05-16 DIAGNOSIS — D64.81 ANTINEOPLASTIC CHEMOTHERAPY INDUCED ANEMIA: Primary | ICD-10-CM

## 2024-05-16 LAB
ALBUMIN SERPL BCG-MCNC: 3.5 G/DL (ref 3.5–5.2)
ALP SERPL-CCNC: 62 U/L (ref 40–150)
ALT SERPL W P-5'-P-CCNC: 23 U/L (ref 0–70)
ANION GAP SERPL CALCULATED.3IONS-SCNC: 9 MMOL/L (ref 7–15)
AST SERPL W P-5'-P-CCNC: 19 U/L (ref 0–45)
BASOPHILS # BLD AUTO: 0 10E3/UL (ref 0–0.2)
BASOPHILS NFR BLD AUTO: 0 %
BILIRUB SERPL-MCNC: 0.6 MG/DL
BUN SERPL-MCNC: 32.8 MG/DL (ref 8–23)
CALCIUM SERPL-MCNC: 9.6 MG/DL (ref 8.8–10.2)
CHLORIDE SERPL-SCNC: 102 MMOL/L (ref 98–107)
CREAT SERPL-MCNC: 2.47 MG/DL (ref 0.67–1.17)
DEPRECATED HCO3 PLAS-SCNC: 27 MMOL/L (ref 22–29)
EGFRCR SERPLBLD CKD-EPI 2021: 27 ML/MIN/1.73M2
EOSINOPHIL # BLD AUTO: 0.1 10E3/UL (ref 0–0.7)
EOSINOPHIL NFR BLD AUTO: 1 %
ERYTHROCYTE [DISTWIDTH] IN BLOOD BY AUTOMATED COUNT: 18.2 % (ref 10–15)
FERRITIN SERPL-MCNC: 389 NG/ML (ref 31–409)
GLUCOSE SERPL-MCNC: 284 MG/DL (ref 70–99)
HCT VFR BLD AUTO: 27.9 % (ref 40–53)
HGB BLD-MCNC: 8.7 G/DL (ref 13.3–17.7)
IMM GRANULOCYTES # BLD: 0.1 10E3/UL
IMM GRANULOCYTES NFR BLD: 1 %
IRON BINDING CAPACITY (ROCHE): 200 UG/DL (ref 240–430)
IRON SATN MFR SERPL: 15 % (ref 15–46)
IRON SERPL-MCNC: 30 UG/DL (ref 61–157)
LYMPHOCYTES # BLD AUTO: 0.6 10E3/UL (ref 0.8–5.3)
LYMPHOCYTES NFR BLD AUTO: 6 %
MCH RBC QN AUTO: 33.1 PG (ref 26.5–33)
MCHC RBC AUTO-ENTMCNC: 31.2 G/DL (ref 31.5–36.5)
MCV RBC AUTO: 106 FL (ref 78–100)
MONOCYTES # BLD AUTO: 0.4 10E3/UL (ref 0–1.3)
MONOCYTES NFR BLD AUTO: 4 %
NEUTROPHILS # BLD AUTO: 8.5 10E3/UL (ref 1.6–8.3)
NEUTROPHILS NFR BLD AUTO: 88 %
NRBC # BLD AUTO: 0.1 10E3/UL
NRBC BLD AUTO-RTO: 1 /100
PLATELET # BLD AUTO: 131 10E3/UL (ref 150–450)
POTASSIUM SERPL-SCNC: 5.5 MMOL/L (ref 3.4–5.3)
PROT SERPL-MCNC: 6.4 G/DL (ref 6.4–8.3)
RBC # BLD AUTO: 2.63 10E6/UL (ref 4.4–5.9)
SODIUM SERPL-SCNC: 138 MMOL/L (ref 135–145)
T4 FREE SERPL-MCNC: 1.24 NG/DL (ref 0.9–1.7)
TSH SERPL DL<=0.005 MIU/L-ACNC: 5.32 UIU/ML (ref 0.3–4.2)
WBC # BLD AUTO: 9.7 10E3/UL (ref 4–11)

## 2024-05-16 PROCEDURE — G0463 HOSPITAL OUTPT CLINIC VISIT: HCPCS | Mod: 25 | Performed by: INTERNAL MEDICINE

## 2024-05-16 PROCEDURE — 99207 PR NO CHARGE LOS: CPT

## 2024-05-16 PROCEDURE — 93010 ELECTROCARDIOGRAM REPORT: CPT | Performed by: INTERNAL MEDICINE

## 2024-05-16 PROCEDURE — 99214 OFFICE O/P EST MOD 30 MIN: CPT | Performed by: INTERNAL MEDICINE

## 2024-05-16 PROCEDURE — 82040 ASSAY OF SERUM ALBUMIN: CPT | Performed by: INTERNAL MEDICINE

## 2024-05-16 PROCEDURE — 96375 TX/PRO/DX INJ NEW DRUG ADDON: CPT

## 2024-05-16 PROCEDURE — 96365 THER/PROPH/DIAG IV INF INIT: CPT | Mod: 59

## 2024-05-16 PROCEDURE — 250N000011 HC RX IP 250 OP 636: Performed by: INTERNAL MEDICINE

## 2024-05-16 PROCEDURE — 84439 ASSAY OF FREE THYROXINE: CPT

## 2024-05-16 PROCEDURE — 36591 DRAW BLOOD OFF VENOUS DEVICE: CPT | Performed by: INTERNAL MEDICINE

## 2024-05-16 PROCEDURE — 84443 ASSAY THYROID STIM HORMONE: CPT

## 2024-05-16 PROCEDURE — 85025 COMPLETE CBC W/AUTO DIFF WBC: CPT | Performed by: INTERNAL MEDICINE

## 2024-05-16 PROCEDURE — 82607 VITAMIN B-12: CPT

## 2024-05-16 PROCEDURE — 96361 HYDRATE IV INFUSION ADD-ON: CPT

## 2024-05-16 PROCEDURE — 258N000003 HC RX IP 258 OP 636: Performed by: INTERNAL MEDICINE

## 2024-05-16 PROCEDURE — 83550 IRON BINDING TEST: CPT

## 2024-05-16 PROCEDURE — 82728 ASSAY OF FERRITIN: CPT

## 2024-05-16 PROCEDURE — G0498 CHEMO EXTEND IV INFUS W/PUMP: HCPCS

## 2024-05-16 PROCEDURE — 250N000009 HC RX 250: Performed by: INTERNAL MEDICINE

## 2024-05-16 RX ORDER — ALBUTEROL SULFATE 90 UG/1
1-2 AEROSOL, METERED RESPIRATORY (INHALATION)
Start: 2024-08-26

## 2024-05-16 RX ORDER — HEPARIN SODIUM (PORCINE) LOCK FLUSH IV SOLN 100 UNIT/ML 100 UNIT/ML
5 SOLUTION INTRAVENOUS
OUTPATIENT
Start: 2024-08-26

## 2024-05-16 RX ORDER — ALBUTEROL SULFATE 0.83 MG/ML
2.5 SOLUTION RESPIRATORY (INHALATION)
OUTPATIENT
Start: 2024-07-29

## 2024-05-16 RX ORDER — MEPERIDINE HYDROCHLORIDE 25 MG/ML
25 INJECTION INTRAMUSCULAR; INTRAVENOUS; SUBCUTANEOUS EVERY 30 MIN PRN
OUTPATIENT
Start: 2024-07-29

## 2024-05-16 RX ORDER — HEPARIN SODIUM,PORCINE 10 UNIT/ML
5-20 VIAL (ML) INTRAVENOUS DAILY PRN
OUTPATIENT
Start: 2024-09-11

## 2024-05-16 RX ORDER — ALBUTEROL SULFATE 0.83 MG/ML
2.5 SOLUTION RESPIRATORY (INHALATION)
OUTPATIENT
Start: 2024-09-09

## 2024-05-16 RX ORDER — ALBUTEROL SULFATE 90 UG/1
1-2 AEROSOL, METERED RESPIRATORY (INHALATION)
Start: 2024-10-07

## 2024-05-16 RX ORDER — ALBUTEROL SULFATE 0.83 MG/ML
2.5 SOLUTION RESPIRATORY (INHALATION)
OUTPATIENT
Start: 2024-10-07

## 2024-05-16 RX ORDER — MEPERIDINE HYDROCHLORIDE 25 MG/ML
25 INJECTION INTRAMUSCULAR; INTRAVENOUS; SUBCUTANEOUS EVERY 30 MIN PRN
OUTPATIENT
Start: 2024-08-26

## 2024-05-16 RX ORDER — METHYLPREDNISOLONE SODIUM SUCCINATE 125 MG/2ML
125 INJECTION, POWDER, LYOPHILIZED, FOR SOLUTION INTRAMUSCULAR; INTRAVENOUS
Start: 2024-09-23

## 2024-05-16 RX ORDER — HEPARIN SODIUM (PORCINE) LOCK FLUSH IV SOLN 100 UNIT/ML 100 UNIT/ML
5 SOLUTION INTRAVENOUS
OUTPATIENT
Start: 2024-09-11

## 2024-05-16 RX ORDER — ONDANSETRON 2 MG/ML
8 INJECTION INTRAMUSCULAR; INTRAVENOUS ONCE
Status: COMPLETED | OUTPATIENT
Start: 2024-05-16 | End: 2024-05-16

## 2024-05-16 RX ORDER — ONDANSETRON 2 MG/ML
8 INJECTION INTRAMUSCULAR; INTRAVENOUS ONCE
OUTPATIENT
Start: 2024-09-23

## 2024-05-16 RX ORDER — MEPERIDINE HYDROCHLORIDE 25 MG/ML
25 INJECTION INTRAMUSCULAR; INTRAVENOUS; SUBCUTANEOUS EVERY 30 MIN PRN
OUTPATIENT
Start: 2024-10-07

## 2024-05-16 RX ORDER — MEPERIDINE HYDROCHLORIDE 25 MG/ML
25 INJECTION INTRAMUSCULAR; INTRAVENOUS; SUBCUTANEOUS EVERY 30 MIN PRN
OUTPATIENT
Start: 2024-09-23

## 2024-05-16 RX ORDER — EPINEPHRINE 1 MG/ML
0.3 INJECTION, SOLUTION INTRAMUSCULAR; SUBCUTANEOUS EVERY 5 MIN PRN
OUTPATIENT
Start: 2024-08-12

## 2024-05-16 RX ORDER — EPINEPHRINE 1 MG/ML
0.3 INJECTION, SOLUTION INTRAMUSCULAR; SUBCUTANEOUS EVERY 5 MIN PRN
OUTPATIENT
Start: 2024-09-09

## 2024-05-16 RX ORDER — HEPARIN SODIUM (PORCINE) LOCK FLUSH IV SOLN 100 UNIT/ML 100 UNIT/ML
5 SOLUTION INTRAVENOUS
OUTPATIENT
Start: 2024-10-07

## 2024-05-16 RX ORDER — MEPERIDINE HYDROCHLORIDE 25 MG/ML
25 INJECTION INTRAMUSCULAR; INTRAVENOUS; SUBCUTANEOUS EVERY 30 MIN PRN
OUTPATIENT
Start: 2024-09-09

## 2024-05-16 RX ORDER — HEPARIN SODIUM,PORCINE 10 UNIT/ML
5-20 VIAL (ML) INTRAVENOUS DAILY PRN
OUTPATIENT
Start: 2024-07-31

## 2024-05-16 RX ORDER — ONDANSETRON 2 MG/ML
8 INJECTION INTRAMUSCULAR; INTRAVENOUS ONCE
OUTPATIENT
Start: 2024-08-12

## 2024-05-16 RX ORDER — ONDANSETRON 2 MG/ML
8 INJECTION INTRAMUSCULAR; INTRAVENOUS ONCE
OUTPATIENT
Start: 2024-07-29

## 2024-05-16 RX ORDER — HEPARIN SODIUM,PORCINE 10 UNIT/ML
5-20 VIAL (ML) INTRAVENOUS DAILY PRN
OUTPATIENT
Start: 2024-08-26

## 2024-05-16 RX ORDER — HEPARIN SODIUM,PORCINE 10 UNIT/ML
5-20 VIAL (ML) INTRAVENOUS DAILY PRN
OUTPATIENT
Start: 2024-07-29

## 2024-05-16 RX ORDER — EPINEPHRINE 1 MG/ML
0.3 INJECTION, SOLUTION INTRAMUSCULAR; SUBCUTANEOUS EVERY 5 MIN PRN
OUTPATIENT
Start: 2024-08-26

## 2024-05-16 RX ORDER — HEPARIN SODIUM,PORCINE 10 UNIT/ML
5-20 VIAL (ML) INTRAVENOUS DAILY PRN
OUTPATIENT
Start: 2024-09-25

## 2024-05-16 RX ORDER — HEPARIN SODIUM,PORCINE 10 UNIT/ML
5-20 VIAL (ML) INTRAVENOUS DAILY PRN
OUTPATIENT
Start: 2024-09-09

## 2024-05-16 RX ORDER — DIPHENHYDRAMINE HYDROCHLORIDE 50 MG/ML
50 INJECTION INTRAMUSCULAR; INTRAVENOUS
Start: 2024-10-07

## 2024-05-16 RX ORDER — HEPARIN SODIUM,PORCINE 10 UNIT/ML
5-20 VIAL (ML) INTRAVENOUS DAILY PRN
OUTPATIENT
Start: 2024-08-12

## 2024-05-16 RX ORDER — ALBUTEROL SULFATE 90 UG/1
1-2 AEROSOL, METERED RESPIRATORY (INHALATION)
Start: 2024-08-12

## 2024-05-16 RX ORDER — HEPARIN SODIUM (PORCINE) LOCK FLUSH IV SOLN 100 UNIT/ML 100 UNIT/ML
5 SOLUTION INTRAVENOUS
OUTPATIENT
Start: 2024-08-28

## 2024-05-16 RX ORDER — DIPHENHYDRAMINE HYDROCHLORIDE 50 MG/ML
50 INJECTION INTRAMUSCULAR; INTRAVENOUS
Start: 2024-07-29

## 2024-05-16 RX ORDER — EPINEPHRINE 1 MG/ML
0.3 INJECTION, SOLUTION INTRAMUSCULAR; SUBCUTANEOUS EVERY 5 MIN PRN
OUTPATIENT
Start: 2024-07-29

## 2024-05-16 RX ORDER — DIPHENHYDRAMINE HYDROCHLORIDE 50 MG/ML
50 INJECTION INTRAMUSCULAR; INTRAVENOUS
Start: 2024-08-12

## 2024-05-16 RX ORDER — HEPARIN SODIUM (PORCINE) LOCK FLUSH IV SOLN 100 UNIT/ML 100 UNIT/ML
5 SOLUTION INTRAVENOUS
OUTPATIENT
Start: 2024-08-14

## 2024-05-16 RX ORDER — HEPARIN SODIUM (PORCINE) LOCK FLUSH IV SOLN 100 UNIT/ML 100 UNIT/ML
5 SOLUTION INTRAVENOUS
OUTPATIENT
Start: 2024-07-29

## 2024-05-16 RX ORDER — METHYLPREDNISOLONE SODIUM SUCCINATE 125 MG/2ML
125 INJECTION, POWDER, LYOPHILIZED, FOR SOLUTION INTRAMUSCULAR; INTRAVENOUS
Start: 2024-08-12

## 2024-05-16 RX ORDER — ALBUTEROL SULFATE 0.83 MG/ML
2.5 SOLUTION RESPIRATORY (INHALATION)
OUTPATIENT
Start: 2024-08-26

## 2024-05-16 RX ORDER — MEPERIDINE HYDROCHLORIDE 25 MG/ML
25 INJECTION INTRAMUSCULAR; INTRAVENOUS; SUBCUTANEOUS EVERY 30 MIN PRN
OUTPATIENT
Start: 2024-08-12

## 2024-05-16 RX ORDER — ALBUTEROL SULFATE 90 UG/1
1-2 AEROSOL, METERED RESPIRATORY (INHALATION)
Start: 2024-07-29

## 2024-05-16 RX ORDER — ALBUTEROL SULFATE 0.83 MG/ML
2.5 SOLUTION RESPIRATORY (INHALATION)
OUTPATIENT
Start: 2024-09-23

## 2024-05-16 RX ORDER — HEPARIN SODIUM (PORCINE) LOCK FLUSH IV SOLN 100 UNIT/ML 100 UNIT/ML
5 SOLUTION INTRAVENOUS
OUTPATIENT
Start: 2024-10-09

## 2024-05-16 RX ORDER — EPINEPHRINE 1 MG/ML
0.3 INJECTION, SOLUTION INTRAMUSCULAR; SUBCUTANEOUS EVERY 5 MIN PRN
OUTPATIENT
Start: 2024-09-23

## 2024-05-16 RX ORDER — ALBUTEROL SULFATE 90 UG/1
1-2 AEROSOL, METERED RESPIRATORY (INHALATION)
Start: 2024-09-09

## 2024-05-16 RX ORDER — HEPARIN SODIUM,PORCINE 10 UNIT/ML
5-20 VIAL (ML) INTRAVENOUS DAILY PRN
OUTPATIENT
Start: 2024-08-14

## 2024-05-16 RX ORDER — HEPARIN SODIUM,PORCINE 10 UNIT/ML
5-20 VIAL (ML) INTRAVENOUS DAILY PRN
OUTPATIENT
Start: 2024-10-07

## 2024-05-16 RX ORDER — ONDANSETRON 2 MG/ML
8 INJECTION INTRAMUSCULAR; INTRAVENOUS ONCE
OUTPATIENT
Start: 2024-09-09

## 2024-05-16 RX ORDER — HEPARIN SODIUM,PORCINE 10 UNIT/ML
5-20 VIAL (ML) INTRAVENOUS DAILY PRN
OUTPATIENT
Start: 2024-09-23

## 2024-05-16 RX ORDER — ALBUTEROL SULFATE 90 UG/1
1-2 AEROSOL, METERED RESPIRATORY (INHALATION)
Start: 2024-09-23

## 2024-05-16 RX ORDER — ALBUTEROL SULFATE 0.83 MG/ML
10 SOLUTION RESPIRATORY (INHALATION) ONCE
Status: COMPLETED | OUTPATIENT
Start: 2024-05-16 | End: 2024-05-16

## 2024-05-16 RX ORDER — HEPARIN SODIUM (PORCINE) LOCK FLUSH IV SOLN 100 UNIT/ML 100 UNIT/ML
500 SOLUTION INTRAVENOUS EVERY 8 HOURS
Status: DISCONTINUED | OUTPATIENT
Start: 2024-05-16 | End: 2024-05-16 | Stop reason: HOSPADM

## 2024-05-16 RX ORDER — ONDANSETRON 2 MG/ML
8 INJECTION INTRAMUSCULAR; INTRAVENOUS ONCE
OUTPATIENT
Start: 2024-10-07

## 2024-05-16 RX ORDER — HEPARIN SODIUM,PORCINE 10 UNIT/ML
5-20 VIAL (ML) INTRAVENOUS DAILY PRN
OUTPATIENT
Start: 2024-08-28

## 2024-05-16 RX ORDER — HEPARIN SODIUM,PORCINE 10 UNIT/ML
5-20 VIAL (ML) INTRAVENOUS DAILY PRN
OUTPATIENT
Start: 2024-10-09

## 2024-05-16 RX ORDER — METHYLPREDNISOLONE SODIUM SUCCINATE 125 MG/2ML
125 INJECTION, POWDER, LYOPHILIZED, FOR SOLUTION INTRAMUSCULAR; INTRAVENOUS
Start: 2024-09-09

## 2024-05-16 RX ORDER — HEPARIN SODIUM (PORCINE) LOCK FLUSH IV SOLN 100 UNIT/ML 100 UNIT/ML
5 SOLUTION INTRAVENOUS
OUTPATIENT
Start: 2024-08-12

## 2024-05-16 RX ORDER — METHYLPREDNISOLONE SODIUM SUCCINATE 125 MG/2ML
125 INJECTION, POWDER, LYOPHILIZED, FOR SOLUTION INTRAMUSCULAR; INTRAVENOUS
Start: 2024-08-26

## 2024-05-16 RX ORDER — DIPHENHYDRAMINE HYDROCHLORIDE 50 MG/ML
50 INJECTION INTRAMUSCULAR; INTRAVENOUS
Start: 2024-09-23

## 2024-05-16 RX ORDER — HEPARIN SODIUM (PORCINE) LOCK FLUSH IV SOLN 100 UNIT/ML 100 UNIT/ML
5 SOLUTION INTRAVENOUS
OUTPATIENT
Start: 2024-07-31

## 2024-05-16 RX ORDER — EPINEPHRINE 1 MG/ML
0.3 INJECTION, SOLUTION INTRAMUSCULAR; SUBCUTANEOUS EVERY 5 MIN PRN
OUTPATIENT
Start: 2024-10-07

## 2024-05-16 RX ORDER — METHYLPREDNISOLONE SODIUM SUCCINATE 125 MG/2ML
125 INJECTION, POWDER, LYOPHILIZED, FOR SOLUTION INTRAMUSCULAR; INTRAVENOUS
Start: 2024-10-07

## 2024-05-16 RX ORDER — METHYLPREDNISOLONE SODIUM SUCCINATE 125 MG/2ML
125 INJECTION, POWDER, LYOPHILIZED, FOR SOLUTION INTRAMUSCULAR; INTRAVENOUS
Start: 2024-07-29

## 2024-05-16 RX ORDER — DIPHENHYDRAMINE HYDROCHLORIDE 50 MG/ML
50 INJECTION INTRAMUSCULAR; INTRAVENOUS
Start: 2024-08-26

## 2024-05-16 RX ORDER — HEPARIN SODIUM (PORCINE) LOCK FLUSH IV SOLN 100 UNIT/ML 100 UNIT/ML
5 SOLUTION INTRAVENOUS
OUTPATIENT
Start: 2024-09-25

## 2024-05-16 RX ORDER — HEPARIN SODIUM (PORCINE) LOCK FLUSH IV SOLN 100 UNIT/ML 100 UNIT/ML
5 SOLUTION INTRAVENOUS
OUTPATIENT
Start: 2024-09-23

## 2024-05-16 RX ORDER — ONDANSETRON 2 MG/ML
8 INJECTION INTRAMUSCULAR; INTRAVENOUS ONCE
OUTPATIENT
Start: 2024-08-26

## 2024-05-16 RX ORDER — DIPHENHYDRAMINE HYDROCHLORIDE 50 MG/ML
50 INJECTION INTRAMUSCULAR; INTRAVENOUS
Start: 2024-09-09

## 2024-05-16 RX ORDER — HEPARIN SODIUM (PORCINE) LOCK FLUSH IV SOLN 100 UNIT/ML 100 UNIT/ML
5 SOLUTION INTRAVENOUS
OUTPATIENT
Start: 2024-09-09

## 2024-05-16 RX ORDER — ALBUTEROL SULFATE 0.83 MG/ML
2.5 SOLUTION RESPIRATORY (INHALATION)
OUTPATIENT
Start: 2024-08-12

## 2024-05-16 RX ADMIN — LEUCOVORIN CALCIUM 800 MG: 350 INJECTION, POWDER, LYOPHILIZED, FOR SUSPENSION INTRAMUSCULAR; INTRAVENOUS at 15:47

## 2024-05-16 RX ADMIN — ONDANSETRON 8 MG: 2 INJECTION INTRAMUSCULAR; INTRAVENOUS at 15:43

## 2024-05-16 RX ADMIN — Medication 500 UNITS: at 13:45

## 2024-05-16 RX ADMIN — ALBUTEROL SULFATE 10 MG: 2.5 SOLUTION RESPIRATORY (INHALATION) at 15:40

## 2024-05-16 RX ADMIN — SODIUM CHLORIDE 1000 ML: 9 INJECTION, SOLUTION INTRAVENOUS at 14:58

## 2024-05-16 ASSESSMENT — PAIN SCALES - GENERAL: PAINLEVEL: NO PAIN (0)

## 2024-05-16 NOTE — PROGRESS NOTES
"Patient's name and  were verified.  See Doc Flowsheet - IV assess for details.  IVAD accessed with 20G 3/4\" castaneda gripper plus needle  blood return positive: YES  Site without redness, tenderness or swelling: YES  flushed with 30cc NS and 5cc 100u/ml heparin  Needle: left accessed for chemotherapy  Comments: Labs drawn.  Patient tolerated procedure without incident.    Geovanni Galdamez  RN, BSN    "

## 2024-05-16 NOTE — PROGRESS NOTES
[facility]    Hematology/Oncology Established Patient Follow-Up Note      Today's Date: 5/16/2024    Reason for follow-up: cecal adenocarcinoma     HISTORY OF PRESENT ILLNESS: Berny Estrada is a 74 year old male who presents for follow-up    Patient has medical history including right renal transplant (2014), hypertension, type 2 diabetes with proliferative diabetic retinopathy and long-term insulin use, CKD stage III, CAD, COPD, GERD, colon polyps (tubular adenoma), esophageal candidiasis, cholelithiasis, sleep apnea, hx of tobacco use (quit 50 years ago)    -12/1/23- 12/9/23 hospitalized 2/2 anemia, hgb 7.9 w/melena x few months (was on iron)  - 12/6/23 CEA 11.69, 12/7/23 CEA 10  - 12/14/2023 EGD: Patchy white plaques in proximal esophagus and midesophagus suspicious for candidiasis, otherwise normal  -12/14/2023 [facility]:  - Fungating partially obstructing, partially circumferential (involving two thirds of the lumen circumference) large mass found in the cecum, including IC valve, with oozing  - 4 sessile polyps in ascending colon, 3-5 mm in size  - 2 mm sessile polyp in transverse colon  - 2 sessile polyps in the ascending colon, 4-5 mm in size  - 6 sessile polyps in the rectosigmoid colon, 3-8 mm in size  - Medium sized external hemorrhoids  PATHOLOGY:  1.  MID ESOPHAGUS, BIOPSY:  - Squamous mucosa with superficial colonization by fungal organisms positive for PAS stain, morphologically compatible with Candida species    2.  COLON, CECUM MASS, BIOPSY:  - Adenocarcinoma, moderately differentiated  - Loss of expression of MLH1 and PMS2; intact expression of MSH2 and MSH6   - MLH1 promoter methylation: POSITIVE   - BRAF V600 mutation positive     3.  ASCENDING COLON, POLYPECTOMY:  - Sessile serrated adenoma with focal high-grade dysplasia  - Separate minute fragment of adenocarcinoma     4.  TRANSVERSE COLON, POLYPECTOMY:  - Tubular  adenoma; negative for high-grade dysplasia     5.  DESCENDING COLON, POLYPECTOMY:  - Tubular adenoma; negative for high-grade dysplasia     6.  RECTOSIGMOID COLON, POLYPECTOMY:  - Sessile serrated adenoma with focal cytologic dysplasia  - No evidence of high-grade dysplasia or invasive carcinoma    -12/21/23 CT CAP without contrast:  -Cardiomegaly, coronary artery stenting calcifications  - Dilated main pulmonary artery, 3.8 cm  - Calcified hilar lymphadenopathy  - RML and lingular bronchiectatic changes with associated fibroatelectasis, may be secondary to chronic infectious/inflammatory process, Ill-defined groundglass opacities predominantly affecting DERIC and lingula, Dependent atelectasis  - Small left-sided pleural effusion  -Atrophic thyroid with ill-defined subcentimeter nodularity  - Cholelithiasis  - Atrophic appearance of native kidneys, right lower quadrant renal transplant with mild hydronephrosis  - 1.6 cm hyperattenuating lesion of left kidney  - Ill-defined soft tissue thickening about cecum, likely corresponding to known cecal mass  - Associated thickening of peritoneal planes in the region  - Enlarged 2.1 x 1.7 cm mesenteric node adjacent to cecum  - Borderline enlarged 0.9 cm perirectal node  IMPRESSION:   1. Cecal mass with enlarged nodes of the right lower quadrant  including a 2.1 x 1.7 cm lymph node, which are suspicious for  metastatic disease. Indeterminate thickening and nodularity of the  peritoneal surfaces near the cecal mass could be due to fluid or  mesenteric spread of disease.  3. Indeterminate 1.6 cm lesion of the left native kidney. Consider  dedicated renal MRI with contrast for further characterization.  4. Right lower quadrant renal transplant with mild hydronephrosis.  5. Ill-defined groundglass opacities of the left upper lobe and  lingula suspicious for an infectious/inflammatory etiology. Additional  bronchiectatic and fibroatelectatic changes of the right middle lobe  and  lingula suggesting a chronic inflammatory process.  6. Small left-sided pleural effusion.  7. Dilated main pulmonary artery as can be seen with pulmonary  arterial hypertension.  8. Cholelithiasis without findings to suggest acute cholecystitis.    -12/21/23 CEA 31.3      Pt denies change in stool caliber, diarrhea, abdominal pain, abdominal distention, nausea, vomiting, no weight loss. Stool is dark (on iron), reported to be black, no BRBPR, no clots. Pt reports weight loss 10/23-11/23 10-15 lbs, then gain 10 over past few months    Pt reports right leg swelling 12/23 onwards with some improvement and palpated knot in left leg. B/l LE doppler 12/8/23 negative for DVT    Regarding ECOG:  Significant decline over the past 6 months, currently using wheelchair and supplemental oxygen  Working with PT 2x/week  Uses cane and walker prn recently, a few months ago was more consistent  Wife helps with most ADLs (cooking, cleaning, groceries, driving). Is able to go to bathroom, shower and dress himself.  Spends about 50% of day in recliner per pt, less per wife  PT 2x/week     Family history:  Brother- colorectal cancer, age 61, s/p surgery, receiving chemotherapy    Regarding kidney transplant  - right kidney transplant 2014  - on tacrolimus and prednisone 10 mg daily (off cellcept since 12/23)  - 12/23 Cr 1.6, GFR 45    - 1/19/24 MRI peritoneum w/contrast:  -  No appreciable focal hepatic lesion, though evaluation is limited by marked respiratory motion artifact..   - Subcentimeter T2 hyperintense cystic foci  in the pancreatic head and body/tail. 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 Recommend attention on  follow-up.  - Hypoenhancing cecal mass measuring approximately 4.0 x 5.8 x 5.7 cm just below the ileocecal valve.  - Enlarged right lower quadrant/pericecal mesenteric lymph  nodes measuring up to 2.0 cm short axis  - Small left pleural effusion with adjacent compressive  atelectasis.  Cardiomegaly  -  Trace ascites, greatest in the pericecal region without  appreciable suspicious peritoneal nodularity      INTERIM HISTORY:  - 2/28/24 port placement    REGIMEN:  Infusional 5FU and LV  q14 days, up to 24 weeks of perioperative chemo total   C1D1 3/6/24, C2D1 4/4/24, C3D1 4/18/24, C4D1 5/2/24  LV 350mg/m2 day 1  5FU 1200mg/m2 continuous infusion day 1-2 (total 2,400mg/m2 IV over 46-48 hours)  Emetic risk: low  Febrile neutropenia risk: low    C5D1 5/16/24 planned pending labs    Treatment related AE:  - weakness- C1D2- 3/8/24- 3/10/24 admission at [facility] for mechanical fall, leaning forward to pick something up off the ground in the bathroom when he fell forward impacting his right eye requiring sutures for laceration, no LOC, CT showed right orbital roof, wall, and floor fractures with. Right eye pressure 33, left eye 11. Seen by ophthalmology, ENT, neurosurgery- no acute intervention; C2D1- ongoing weakness, needed assistance w/transfers; C3 and C4- pt reports weakness is better after a few days, states he ambulates independently but wife reports he is stumbling more often, now agreeable to using walker; appetite ok, drinking at least 32oz per day  - hyperglycemia w/episodes of hypoglycemia- labile BS, C2 4/4/24 BS 50s- 400s, dexamethasone discontinued, C3 BS 190s, C4 BS 300s,   - macrocytic anemia- BL 11, C2 onwards hgb 10, work up showed no nutritional def, elevated retic; 5/11/24 [facility] 7.3 ([facility], submassive saddle PE)- given 1 uprbc; 5/16/24 hgb 8.7  - thrombocytopenia- BL plt 172K, C2 onwards plt 100K, 5/16/24 plt 131K  - GUANACO- 5/13/24 Cr 1.9, 5/16/24 Cr 2.47  - hyperkalemia- 5/16/24 K 5.5        - 4/24/24 CT CAP w/o contrast 2/2 kidney function  1.  Grossly unchanged size/appearance of the cecal mass with decreased adjacent right lower quadrant mesenteric lymph nodes (10 mm lymph node previously measured 18 mm when measured similarly).  2.  Slightly increased  size of a few mesorectal lymph nodes versus dependent peritoneal deposits in the pelvis, suspicious for metastatic disease. Continued attention on follow-up is recommended. (Slightly increased size of a few mesorectal lymph nodes versus dependent peritoneal   deposits. For instance 12 mm nodules both previously measured 9 mm)  3.  No definite new sites of disease in the chest, abdomen or pelvis.  4.  Unchanged mild pelvocaliectasis of the right lower quadrant transplant kidney.    -4/24/24 MR peritoneum w/wo contrast:  - Hypoenhancing cecal mass measuring approximately 3.6 x 5.1 x 5.2 cm just below the ileocecal valve, previously 4.2 x 6.5 x 5.9 cm, when measured similarly  -  Decreased size of several enlarged right lower quadrant mesenteric lymph nodes, the largest measuring 1.3 cm short axis, previously 2.1 cm.  - Trace ascites,greatest in the pericecal region, though decreased since prior.  Slightly increased conspicuity of enhancing soft tissue abnormality along the posterolateral aspect of the cecum  - Increased conspicuity of enhancing soft tissue abnormality in the pelvis between the rectum and bladder with the largest nodule measuring 2.0 x 1.4 x 1.2 cm, previously 1.7 x 1.3 x 1.0 cm.  - No appreciable suspicious hepatic lesion, though evaluation is limited by marked respiratory motion artifact.  - Right lower quadrant renal transplant with stable moderate hydronephrosis upstream of the ureteropelvic junction.  - Cholelithiasis.  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 Recommend attention on follow-up.    - 5/8/24-5/13/24 [facility] x2 min (wife found him unresponsive on bed), found to have RLE DVT, submassive saddle PE w/acute hypoxic RF, echo: at least mild RV strain, s/p mechanical thrombectomy 5/8/24 (no systemic thrombolytics 2/2 risk of bleed), started on heparin and discharged on eliquis, hgb 7.3 during admission-  [facility]  - 5/7/24 CT PE: Large saddle pulmonary embolism, which continues extensively into   branches of the right pulmonary artery, including proximal emboli   into the right upper, right middle, and most extensively, right   lower lobe, with likely complete occlusion of pulmonary arteries   extending into the lateral basilar segment.     4.7 x 3.7 x 2.2 cm irregular peripheral pleural-based dense   consolidation lateral right upper lobe, with penumbra of   groundglass, extending to the hilum, favored to represent   evolving pulmonary infarct. Mass or infiltrate could have a   similar appearance.     Additional areas of scattered groundglass, and small irregular   opacities, example (series 5):     - 2.2 x 1.5 cm patchy consolidation central right middle   lobe, image 141     - 2 x 1.8 cm posterior pleural-based left lung apex, image 40     may also represent additional infarct, pulmonary metastasis, or   focal pneumonitis.     Scattered additional small irregular opacities are seen   bilaterally, most extensively in the central right lung.     A prior small to moderate right pleural effusion is resolved.   Irregular patchy left lower lobe pleural-based consolidation   appears similar to prior, with a small pleural effusion, which is   mildly decreased.   - Main   pulmonary artery 4 cm, markedly enlarged. Dilated right ventricle   with ventricular strain pattern. Likely concentric LVH. No   pericardial effusion. Small mediastinal lymph nodes, and   borderline subcarinal node measuring up to 12 mm, unchanged.      1. Large saddle pulmonary embolism, with most extensive emboli   burden extending into branches of the right pulmonary artery,   with cyst occlusion of right lower lobe anterior basilar segment   pulmonary arteries. Findings were discussed with Dr. Tiffanie Arndt of the emergency service at 11:45 AM central time on   5/8/2024, who verbalized understanding.     2. Multiple patchy consolidations and  scattered small irregular   opacities with regional groundglass, the largest in the lateral   subpleural right upper lobe, suspect evolving pulmonary infarcts.   Pulmonary metastases, or focal infiltrates could have a similar   appearance.     3. Resolved prior right pleural effusion, with persistent   pleural-based consolidation in the medial left lower lobe, with   small effusion (slightly decreased).     4. Cholelithiasis.     5. Markedly enlarged pulmonary artery, consistent with elevated   pulmonary arterial pressures.     6. Multichamber cardiac enlargement, biventricular, with   concentric LVH.     7. Right subclavian portacatheter.     - 5/8/24 b/l LE doppler: Acute partially occlusive thrombus within one of   the posterior tibial veins at the right mid calf. This is part of   the deep venous system   - 5/8/24 mechanical thrombectomy: evacuated a large amount   of thrombus including the saddle thrombus and occlusive lobar and   segmental thrombus. Follow-up angiogram demonstrates excellent flow through the main, right, lobar and segmental pulmonary arteries.   REVIEW OF SYSTEMS:   A 14 point ROS was reviewed with pertinent positives and negatives in the HPI.        HOME MEDICATIONS:  Current Outpatient Medications   Medication Sig Dispense Refill    albuterol (PROAIR HFA/PROVENTIL HFA/VENTOLIN HFA) 108 (90 Base) MCG/ACT inhaler INHALE 2 PUFFS BY INHALATION EVERY 6 HOURS AS NEEDED FOR SHORTNESS OF BREATH, FOR SECRETIONS      amLODIPine (NORVASC) 10 MG tablet Take 10 mg by mouth daily      apixaban ANTICOAGULANT (ELIQUIS) 5 MG tablet 10 mg      carvedilol (COREG) 12.5 MG tablet Take 12.5 mg by mouth 2 times daily      insulin aspart U-10, diluted conc 10 units/mL, (NOVOLOG) 10 unit/ml injection Inject Subcutaneous 3 times daily (before meals) 10-16 units      INSULIN GLARGINE SC Inject 34 Units Subcutaneous at bedtime 7/11 am      ipratropium - albuterol 0.5 mg/2.5 mg/3 mL (DUONEB) 0.5-2.5 (3) MG/3ML neb  solution INHALE 3ML IN NEBULIZER BY INHALATION FOUR TIMES A DAY AS NEEDED FOR SHORTNESS OF BREATH      lidocaine-prilocaine (EMLA) 2.5-2.5 % external cream Apply topically as needed for moderate pain 30 g 1    loratadine (CLARITIN) 10 MG tablet 10 mg      Magnesium Oxide 420 MG TABS Take 1 tablet by mouth 2 times daily      mycophenolate, IDS 3865, (CELLCEPT) 250 MG capsule Take 2 tablets by mouth 2 times daily      OMEPRAZOLE PO Take 1 tablet by mouth daily      ondansetron (ZOFRAN) 8 MG tablet Take 1 tablet (8 mg) by mouth every 8 hours as needed for nausea (vomiting) 30 tablet 2    ondansetron (ZOFRAN) 8 MG tablet Take 1 tablet (8 mg) by mouth every 8 hours as needed for nausea (vomiting) 30 tablet 2    predniSONE (DELTASONE) 10 MG tablet Take 40 mg by mouth daily      prochlorperazine (COMPAZINE) 10 MG tablet Take 1 tablet (10 mg) by mouth every 6 hours as needed for nausea or vomiting 30 tablet 2    prochlorperazine (COMPAZINE) 10 MG tablet Take 1 tablet (10 mg) by mouth every 6 hours as needed for nausea or vomiting 30 tablet 2    tacrolimus (PROGRAF-GENERIC EQUIVALENT) 5 MG capsule Take 5 mg by mouth daily      tamsulosin (FLOMAX) 0.4 MG capsule Take 0.4 mg by mouth daily      VITAMIN D, CHOLECALCIFEROL, PO Take 1 tablet by mouth once a week           ALLERGIES:  No Known Allergies      PAST MEDICAL HISTORY:  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    Sleep apnea     has equipment  not always uses    Type 2 diabetes mellitus without complications (H)          PAST SURGICAL HISTORY:  Past Surgical History:   Procedure Laterality Date    EYE SURGERY  7/12/2016    CE IOL PPV RE    INSERT PORT VASCULAR ACCESS Right 2/28/2024    Procedure: Insert port vascular access;  Surgeon: Damián Estrada MD;  Location: [facility] OR    IR CHEST PORT PLACEMENT > 5  "YRS OF AGE  2024    PANRETINAL PHOTOCOAGULATION (PRP) OD (RIGHT EYE)      last 13    PANRETINAL PHOTOCOAGULATION (PRP) OS (LEFT EYE)  4/15/14    [facility]    PHACOEMULSIFICATION CLEAR CORNEA WITH STANDARD IOL, VITRECTOMY PARSPLANA 25 GAGUE, COMBINED Right 2016    Procedure: COMBINED PHACOEMULSIFICATION CLEAR CORNEA WITH STANDARD INTRAOCULAR LENS IMPLANT, VITRECTOMY PARSPLANA 25 GAUGE;  Surgeon: Mireille Don MD;  [facility]: [facility]         SOCIAL HISTORY:  Social History     Socioeconomic History    Marital status:      Spouse name: Not on file    Number of children: Not on file    Years of education: Not on file    Highest education level: Not on file   Occupational History    Not on file   Tobacco Use    Smoking status: Former    Smokeless tobacco: Never   Substance and Sexual Activity    Alcohol use: Not on file    Drug use: Not on file    Sexual activity: Not on file   Other Topics Concern    Not on file   Social History Narrative    Not on file     Social Determinants of Health     Financial Resource Strain: Not on file   Food Insecurity: Not on file   Transportation Needs: Not on file   Physical Activity: Not on file   Stress: Not on file   Social Connections: Not on file   Interpersonal Safety: Not on file   Housing Stability: Not on file         FAMILY HISTORY:  Family History   Problem Relation Age of Onset    Glaucoma No family hx of     Macular Degeneration No family hx of          PHYSICAL EXAM:  Vital signs:  BP (!) 143/72 (BP Location: Right arm, Patient Position: Chair, Cuff Size: Adult Large)   Pulse 79   Ht 1.778 m (5' 10\")   Wt 102.1 kg (225 lb)   SpO2 98%   BMI 32.28 kg/m       ECO  GENERAL/CONSTITUTIONAL: No acute distress. In wheelchair  EYES: Pupils are equal and round. Extraocular movements intact without nystagmus.  No scleral icterus.  RESPIRATORY: Equal chest rise.   MUSCULOSKELETAL: Warm and well-perfused, no cyanosis, clubbing. Right LE 2+ " pitting edema to knee, left LE 1+ pitting edema to mid tibia  NEUROLOGIC: Cranial nerves are grossly intact. Alert, oriented to person, place and time, answers questions appropriately.  INTEGUMENTARY: No rashes or jaundice. Left tibia hyperpigmentation           LABS:    PATHOLOGY:      IMAGING:      ASSESSMENT/PLAN:  Berny Estrada is a 74 year old male with:      # moderately differentiated cecum adenocarcinoma, MSI-high (loss of MLH1 and PMS2, MLH1 promoter hypermethylated), BRAF V600 mutation positive  -12/14/23 diagnostic colonoscopy: Fungating partially obstructing, partially circumferential (involving two thirds of the lumen circumference) large mass found in the cecum, including IC valve, with oozing, PATHOLOGY: COLON, CECUM MASS, BIOPSY: Adenocarcinoma, moderately differentiated, Loss of expression of MLH1 and PMS2; intact expression of MSH2 and MSH6, MLH1 promoter methylation: POSITIVE ; BRAF V600 mutation positive (not Prasad syndrome)  - CRC NGS: Detected Alterations of Known or Potential Pathogenicity: BRAF V600E, TMB Score: 73.131 mut/Mb  -12/21/23 CT CAP without contrast: Cecal mass with enlarged nodes of the right lower quadrant including a 2.1 x 1.7 cm lymph node, which are suspicious for metastatic disease. Indeterminate thickening and nodularity of the peritoneal surfaces near the cecal mass could be due to fluid or mesenteric spread of disease. Indeterminate 1.6 cm lesion of the left native kidney. Small left-sided pleural effusion.  -12/7/23 CEA 10,  12/21/23 CEA 31.3, 1/24 CEA 48.5    -1/24 MR peritoneum: no peritoneal disease, Hypoenhancing cecal mass measuring approximately 4.0 x 5.8 x 5.7 cm just below the ileocecal Valve, Enlarged RLQ/pericecal mesenteric lymph nodes measuring up to 2.0 cm short axis     TREATMENT  - pt has at least stage 3 disease with enlarged pericecal mesenteric LN  - d/w Dr. Reid, will attempt to give chemotherapy up front for approximately 3 months and then  restage to see if he is a surgical candidate  - ECOG 2 at baseline at best   - for right sided tumors w/BRAF mutation, FOLFIRINOX or FOLFIRI is used w/avastin, however given pts co-morbidities, I do not think the patient could tolerate this regimen. Will avoid immunotherapy given pt is on immunosuppressive medications for renal transplant     REGIMEN:  Infusional 5FU and LV  q14 days, up to 24 weeks of perioperative chemo total   C1D1 3/6/24, C2D1 4/4/24, C3D1 4/18/24, C4D1 5/2/24  LV 350mg/m2 day 1  5FU 1200mg/m2 continuous infusion day 1-2 (total 2,400mg/m2 IV over 46-48 hours)  Emetic risk: low  Febrile neutropenia risk: low    C5D1 5/16/24 planned pending labs    Treatment related AE:  - weakness- C1D2- 3/8/24- 3/10/24 admission at [facility] for mechanical fall w/facial fractures and laceration requiring stitches, improved now  - hyperglycemia- BS still in 300s despite discontinuing dexamethasone (on prednisone 20mg PO daily for renal transplant), endocrine consult  - macrocytic anemia- 5/11/24 hgb 7.3 ([facility], [facility], submassive saddle PE)- given 1 uprbc; 5/16/24 hgb 8.7, check ferritin, iron studies, B12, RBC folate  - thrombocytopenia- 5/16/24 plt 131K, monitor while on eliquis  - GUANACO- 5/13/24 Cr 1.9, 5/16/24 Cr 2.47, IVF today and tomorrow (if needed), repeat labs tomorrow   - hyperkalemia- 5/16/24 K 5.5, EKG- sinus, IA normal, QRS normal, T waves tented but not peaked, unable to give insulin (not available), will give high dose albuterol 10mg for reversal, avoid diuretics w/GUANACO, repeat labs tomorrow     IMAGING:  - 4/24/24 CT CAP w/o contrast 2/2 kidney function  1.  Grossly unchanged size/appearance of the cecal mass with decreased adjacent right lower quadrant mesenteric lymph nodes (10 mm lymph node previously measured 18 mm when measured similarly).  2.  Slightly increased size of a few mesorectal lymph nodes versus dependent peritoneal deposits in the pelvis, suspicious for  metastatic disease. (For instance 12 mm nodules both previously measured 9 mm)    -4/24/24 MR peritoneum w/wo contrast:  - Hypoenhancing cecal mass measuring approximately 3.6 x 5.1 x 5.2 cm just below the ileocecal valve, previously 4.2 x 6.5 x 5.9 cm, when measured similarly  -  Decreased size of several enlarged right lower quadrant  mesenteric lymph nodes, the largest measuring 1.3 cm short axis, previously 2.1 cm.  - Trace ascites,greatest in the pericecal region, though decreased since prior.  - Slightly increased conspicuity of enhancing soft tissue abnormality  along the posterolateral aspect of the cecum  - Increased conspicuity of enhancing soft tissue abnormality in the  pelvis between the rectum and bladder with the largest nodule measuring 2.0 x 1.4 x 1.2 cm, previously 1.7 x 1.3 x 1.0 cm.    TUMOR MARKERS:  12/21/23 CEA 31.3  1/18/24 CEA 48.5 (prior to starting tx)  4/18/24 CEA 24.5    Other:  - pt is not surgical candidate at this time  - given increase in soft tissue mass b/w rectum and bladder, will discuss case at [facility] tumor board on 5/20/24 (primary mass, RLQ mesenteric LN and tumor markers decreasing)  - cannot add avastin 2/2 DVT, submassive PE on eliquis  - cannot add immunotherapy 2/2 renal transplant (pt on chronic steroids, prednisone 20mg PO daily, off cellcept and tacrolimus)    #submassive PE s/p thrombectomy  #RLE DVT  - 5/8/24-5/13/24 [facility] w/[facility] x2 min (wife found him unresponsive on bed), found to have RLE DVT, submassive saddle PE w/acute hypoxic RF, echo: at least mild RV strain, s/p mechanical thrombectomy 5/8/24 (no systemic thrombolytics 2/2 risk of bleed), started on heparin and discharged on eliquis; hgb 7.3 during admission- given 1uprbc  - 5/7/24 CT PE:   Large saddle pulmonary embolism, with most extensive emboli burden extending into branches of the right pulmonary artery,   with cyst occlusion of right lower lobe anterior basilar segment   pulmonary  arteries.Multiple patchy consolidations and scattered small irregular  opacities with regional groundglass, the largest in the lateral   subpleural right upper lobe, suspect evolving pulmonary infarcts.   Pulmonary metastases, or focal infiltrates could have a similar   appearance. Persistent pleural-based consolidation in the medial left lower lobe, with  small effusion (slightly decreased). Markedly enlarged pulmonary artery, consistent with elevated  pulmonary arterial pressures. Multichamber cardiac enlargement, biventricular, with  concentric LVH.   - 5/8/24 b/l LE doppler: Acute partially occlusive thrombus within one of   the posterior tibial veins at the right mid calf. This is part of   the deep venous system   - 5/8/24 mechanical thrombectomy: evacuated a large amount   of thrombus including the saddle thrombus and occlusive lobar and   segmental thrombus. Follow-up angiogram demonstrates excellent flow through the main, right, lobar and segmental pulmonary arteries.     PLAN:  - pt currently on eliquis 5mg BID    #macrocytic anemia  - per Care Everywhere labs:  12/23 labs  Hgb 7.9-8.9, MCV   Ferritin 1827, iron sat 60, TIBC 111, iron 67  Haptoglobin 177, absolute retic 0.1011  - 1/24 labs  Hgb 11.2, MCV 98  Ferritin 451, iron sat 39, TIBC 179, iron 69  B12 1082, RBC folate >1312  TSH 2.77  CMP Cr 1.72, GFR 41, calcium 9.6, total protein 7.1, albumin 3.8, total bili 0.3, absolute retic 0.111  - 3/24   SPEP and SIFE no monoclonal protein  Kappa 5.52, lambda 3.64, k/l ratio 1.52 normal  Quantitative Ig NA    PLAN:  - macrocytosis likely 2/2 elevated retic  - anemia 2/2 CKD, chemotherapy  - no reversible nutritional deficiencies previously, given 5/24 hgb 7.3 during admission- given 1uprbc, will repeat work up as above    #renal transplant  - [facility]  -  S/p cadaveric transplant in [facility] in 2014   - on tacrolimus and prednisone 10 mg daily (off cellcept since  12/23)  - 12/23 Cr 1.6, GFR 45  - 1/24 MRI peritoneum: Kidneys: Atrophic native kidneys. T1 hyperintense, non-enhancing hemorrhagic/proteinaceous cyst in the posterior mid pole of the left kidney. T2 hyperintense, nonenhancing right renal cortical cysts. Right lower quadrant renal transplant with stable moderate hydronephrosis upstream of the ureterovesical junction. No suspicious renal transplant lesion.  - 2/24 I discussed the above with the pts nephrologist Dr. Cat Goodson  She confirms pt is off of cellcept   His tacrolimus was discontinued 2/24, when pt was admitted for influenza and his prednisone was increased to 20mg  Plan to decrease prednisone back to 10mg when starting chemotherapy  Ok to proceed with 5FU and LV  If absolutely need to use avastin, can do this, need to watch kidney function/vasculature closely   Will start with 5FU/LV and see how pt tolerates. If not surgical candidate based on repeat scans, can add avastin at that time and have close follow up with nephrology.    PLAN:  - pt off tacrolimus and cellcept, currently on prednisone 20mg PO daily   - continue follow up with Dr. 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  #cholelithiasis     #type 2 diabetes  - avg BS 300s  - consult endocrine      RTC 2 weeks for follow up with me, labs, chemo  RTC 4 weeks for follow up with MICHAEL, labs, chemo  RTC 6 weeks for follow up with me, labs, chemo  RTC 8 weeks for follow up with MICHAEL, labs, chemo  RTC 10 weeks for follow up with me, labs, chemo    ADDENDUM:  Case discussed at 5/20/24 [facility] tumor board  Reconsider immunotherapy over avastin  Re-discuss risk of renal transplant rejection vs progression of disease  No role for surgery at this time given concern for peritoneal disease  Keep 5/30/24 appt with me    Stacey Blunt DO  Hematology/Oncology  [facility]

## 2024-05-16 NOTE — NURSING NOTE
"Oncology Rooming Note    May 16, 2024 1:54 PM   Berny Estrada is a 74 year old male who presents for:    Chief Complaint   Patient presents with    Oncology Clinic Visit     Follow up     Initial Vitals: BP (!) 143/72 (BP Location: Right arm, Patient Position: Chair, Cuff Size: Adult Large)   Pulse 79   Ht 1.778 m (5' 10\")   Wt 102.1 kg (225 lb)   SpO2 98%   BMI 32.28 kg/m   Estimated body mass index is 32.28 kg/m  as calculated from the following:    Height as of this encounter: 1.778 m (5' 10\").    Weight as of this encounter: 102.1 kg (225 lb). Body surface area is 2.25 meters squared.  No Pain (0) Comment: Data Unavailable   No LMP for male patient.  Allergies reviewed: Yes  Medications reviewed: Yes    Medications: Medication refills not needed today.  Pharmacy name entered into EPIC: [facility] - [place], [place] - [address]    Frailty Screening:   Is the patient here for a new oncology consult visit in cancer care? 2. No      Clinical concerns: Yes       Sandra Hansen CMA          ]  "

## 2024-05-16 NOTE — PROGRESS NOTES
Infusion Nursing Note:  Berny Estrada presents today for IV fluids, High Dose Albuterol nebulization and C5D1 Leucovorin with 5FU Home Pump Connect.    Patient seen by provider today: Yes: Dr Blunt   present during visit today: Not Applicable.    Note:   Hospitalized last week. Today has hyperkalemia and elevated creatinine. Discussed with Dr Blunt. Due to lab results a liter of IV fluids has been ordered.    Dr Blunt would like patient to have 10U Regular Insulin. Writer checked with our pharmacy whom does not have access to insulin.    Thus Dr Blunt would like patient to have High Dose Albuterol Neb 10 mg. Writer collaborated with pharmacy. Gave Albuterol neb from [id number]. Patient denies shakiness/jitteriness after neb.    Per Dr Blunt, today's EKG is okay and CBC is okay thus okay to proceed with chemo, in addition to liter of fluid.    Patient to return tomorrow to recheck labs.  If creat still elevated, patient will get more IV fluids tomorrow.    Intravenous Access:  Implanted Port.    Treatment Conditions:  Lab Results   Component Value Date    HGB 8.7 (L) 05/16/2024    WBC 9.7 05/16/2024    ANEUTAUTO 8.5 (H) 05/16/2024     (L) 05/16/2024        Lab Results   Component Value Date     05/16/2024    POTASSIUM 5.5 (H) 05/16/2024    CR 2.47 (H) 05/16/2024    VIVI 9.6 05/16/2024    BILITOTAL 0.6 05/16/2024    ALBUMIN 3.5 05/16/2024    ALT 23 05/16/2024    AST 19 05/16/2024       Results reviewed, labs MET treatment parameters, ok to proceed with treatment.      Post Infusion Assessment:  Patient tolerated infusion without incident.  Blood return noted pre and post infusion.  Site patent and intact, free from redness, edema or discomfort.  No evidence of extravasations.  Prior to discharge: Port is secured in place with tegaderm and flushed with 10cc NS with positive blood return noted.    Continuous home infusion Dosi-Fuser pump connected.    All connectors secured in place and clamps  "taped open.    Pump started, \"running\" noted on display (CADD): Not Applicable.   Capillary element taped to pt's skin per protocol.  Pump Connection double checked with Katerina MATTHEW.  Patient instructed to call our clinic or [facility] with any questions or concerns at home.  Patient verbalized understanding.    Message sent to [facility] to schedule patient for a pump disconnect on Saturday 5/18/24 at 1430.      Discharge Plan:   AVS to patient via MYCHART.  Charge nurse working on coordinating port draw and possible IV fluids in our clinic tomorrow.  If patient gets IV fluids,, may need to notify homecare of a later disconnect time on 5/18/24.  Patient discharged in stable condition accompanied by: wife.  Departure Mode: Ambulatory.      Mini Barrera RN  "

## 2024-05-16 NOTE — LETTER
5/16/2024         RE: Berny Estrada  [address] [address]  [place] [address]        Dear Colleague,    Thank you for referring your patient, Berny Estrada, to the [facility]. Please see a copy of my visit note below.    [facility]    Hematology/Oncology Established Patient Follow-Up Note      Today's Date: 5/16/2024    Reason for follow-up: cecal adenocarcinoma     HISTORY OF PRESENT ILLNESS: Berny Estrada is a 74 year old male who presents for follow-up    Patient has medical history including right renal transplant (2014), hypertension, type 2 diabetes with proliferative diabetic retinopathy and long-term insulin use, CKD stage III, CAD, COPD, GERD, colon polyps (tubular adenoma), esophageal candidiasis, cholelithiasis, sleep apnea, hx of tobacco use (quit 50 years ago)    -12/1/23- 12/9/23 hospitalized 2/2 anemia, hgb 7.9 w/melena x few months (was on iron)  - 12/6/23 CEA 11.69, 12/7/23 CEA 10  - 12/14/2023 EGD: Patchy white plaques in proximal esophagus and midesophagus suspicious for candidiasis, otherwise normal  -12/14/2023 [facility]:  - Fungating partially obstructing, partially circumferential (involving two thirds of the lumen circumference) large mass found in the cecum, including IC valve, with oozing  - 4 sessile polyps in ascending colon, 3-5 mm in size  - 2 mm sessile polyp in transverse colon  - 2 sessile polyps in the ascending colon, 4-5 mm in size  - 6 sessile polyps in the rectosigmoid colon, 3-8 mm in size  - Medium sized external hemorrhoids  PATHOLOGY:  1.  MID ESOPHAGUS, BIOPSY:  - Squamous mucosa with superficial colonization by fungal organisms positive for PAS stain, morphologically compatible with Candida species    2.  COLON, CECUM MASS, BIOPSY:  - Adenocarcinoma, moderately differentiated  - Loss of expression of MLH1 and PMS2; intact expression of  MSH2 and MSH6   - MLH1 promoter methylation: POSITIVE   - BRAF V600 mutation positive     3.  ASCENDING COLON, POLYPECTOMY:  - Sessile serrated adenoma with focal high-grade dysplasia  - Separate minute fragment of adenocarcinoma     4.  TRANSVERSE COLON, POLYPECTOMY:  - Tubular adenoma; negative for high-grade dysplasia     5.  DESCENDING COLON, POLYPECTOMY:  - Tubular adenoma; negative for high-grade dysplasia     6.  RECTOSIGMOID COLON, POLYPECTOMY:  - Sessile serrated adenoma with focal cytologic dysplasia  - No evidence of high-grade dysplasia or invasive carcinoma    -12/21/23 CT CAP without contrast:  -Cardiomegaly, coronary artery stenting calcifications  - Dilated main pulmonary artery, 3.8 cm  - Calcified hilar lymphadenopathy  - RML and lingular bronchiectatic changes with associated fibroatelectasis, may be secondary to chronic infectious/inflammatory process, Ill-defined groundglass opacities predominantly affecting DERIC and lingula, Dependent atelectasis  - Small left-sided pleural effusion  -Atrophic thyroid with ill-defined subcentimeter nodularity  - Cholelithiasis  - Atrophic appearance of native kidneys, right lower quadrant renal transplant with mild hydronephrosis  - 1.6 cm hyperattenuating lesion of left kidney  - Ill-defined soft tissue thickening about cecum, likely corresponding to known cecal mass  - Associated thickening of peritoneal planes in the region  - Enlarged 2.1 x 1.7 cm mesenteric node adjacent to cecum  - Borderline enlarged 0.9 cm perirectal node  IMPRESSION:   1. Cecal mass with enlarged nodes of the right lower quadrant  including a 2.1 x 1.7 cm lymph node, which are suspicious for  metastatic disease. Indeterminate thickening and nodularity of the  peritoneal surfaces near the cecal mass could be due to fluid or  mesenteric spread of disease.  3. Indeterminate 1.6 cm lesion of the left native kidney. Consider  dedicated renal MRI with contrast for further  characterization.  4. Right lower quadrant renal transplant with mild hydronephrosis.  5. Ill-defined groundglass opacities of the left upper lobe and  lingula suspicious for an infectious/inflammatory etiology. Additional  bronchiectatic and fibroatelectatic changes of the right middle lobe  and lingula suggesting a chronic inflammatory process.  6. Small left-sided pleural effusion.  7. Dilated main pulmonary artery as can be seen with pulmonary  arterial hypertension.  8. Cholelithiasis without findings to suggest acute cholecystitis.    -12/21/23 CEA 31.3      Pt denies change in stool caliber, diarrhea, abdominal pain, abdominal distention, nausea, vomiting, no weight loss. Stool is dark (on iron), reported to be black, no BRBPR, no clots. Pt reports weight loss 10/23-11/23 10-15 lbs, then gain 10 over past few months    Pt reports right leg swelling 12/23 onwards with some improvement and palpated knot in left leg. B/l LE doppler 12/8/23 negative for DVT    Regarding ECOG:  Significant decline over the past 6 months, currently using wheelchair and supplemental oxygen  Working with PT 2x/week  Uses cane and walker prn recently, a few months ago was more consistent  Wife helps with most ADLs (cooking, cleaning, groceries, driving). Is able to go to bathroom, shower and dress himself.  Spends about 50% of day in recliner per pt, less per wife  PT 2x/week     Family history:  Brother- colorectal cancer, age 61, s/p surgery, receiving chemotherapy    Regarding kidney transplant  - right kidney transplant 2014  - on tacrolimus and prednisone 10 mg daily (off cellcept since 12/23)  - 12/23 Cr 1.6, GFR 45    - 1/19/24 MRI peritoneum w/contrast:  -  No appreciable focal hepatic lesion, though evaluation is limited by marked respiratory motion artifact..   - Subcentimeter T2 hyperintense cystic foci  in the pancreatic head and body/tail. 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 Recommend attention  on  follow-up.  - Hypoenhancing cecal mass measuring approximately 4.0 x 5.8 x 5.7 cm just below the ileocecal valve.  - Enlarged right lower quadrant/pericecal mesenteric lymph  nodes measuring up to 2.0 cm short axis  - Small left pleural effusion with adjacent compressive  atelectasis. Cardiomegaly  -  Trace ascites, greatest in the pericecal region without  appreciable suspicious peritoneal nodularity      INTERIM HISTORY:  - 2/28/24 port placement    REGIMEN:  Infusional 5FU and LV  q14 days, up to 24 weeks of perioperative chemo total   C1D1 3/6/24, C2D1 4/4/24, C3D1 4/18/24, C4D1 5/2/24  LV 350mg/m2 day 1  5FU 1200mg/m2 continuous infusion day 1-2 (total 2,400mg/m2 IV over 46-48 hours)  Emetic risk: low  Febrile neutropenia risk: low    C5D1 5/16/24 planned pending labs    Treatment related AE:  - weakness- C1D2- 3/8/24- 3/10/24 admission at [facility] for mechanical fall, leaning forward to pick something up off the ground in the bathroom when he fell forward impacting his right eye requiring sutures for laceration, no LOC, CT showed right orbital roof, wall, and floor fractures with. Right eye pressure 33, left eye 11. Seen by ophthalmology, ENT, neurosurgery- no acute intervention; C2D1- ongoing weakness, needed assistance w/transfers; C3 and C4- pt reports weakness is better after a few days, states he ambulates independently but wife reports he is stumbling more often, now agreeable to using walker; appetite ok, drinking at least 32oz per day  - hyperglycemia w/episodes of hypoglycemia- labile BS, C2 4/4/24 BS 50s- 400s, dexamethasone discontinued, C3 BS 190s, C4 BS 300s,   - macrocytic anemia- BL 11, C2 onwards hgb 10, work up showed no nutritional def, elevated retic; 5/11/24 [facility] 7.3 ([facility], submassive saddle PE)- given 1 uprbc; 5/16/24 hgb 8.7  - thrombocytopenia- BL plt 172K, C2 onwards plt 100K, 5/16/24 plt 131K  - GUANACO- 5/13/24 Cr 1.9, 5/16/24 Cr 2.47  - hyperkalemia-  5/16/24 K 5.5        - 4/24/24 CT CAP w/o contrast 2/2 kidney function  1.  Grossly unchanged size/appearance of the cecal mass with decreased adjacent right lower quadrant mesenteric lymph nodes (10 mm lymph node previously measured 18 mm when measured similarly).  2.  Slightly increased size of a few mesorectal lymph nodes versus dependent peritoneal deposits in the pelvis, suspicious for metastatic disease. Continued attention on follow-up is recommended. (Slightly increased size of a few mesorectal lymph nodes versus dependent peritoneal   deposits. For instance 12 mm nodules both previously measured 9 mm)  3.  No definite new sites of disease in the chest, abdomen or pelvis.  4.  Unchanged mild pelvocaliectasis of the right lower quadrant transplant kidney.    -4/24/24 MR peritoneum w/wo contrast:  - Hypoenhancing cecal mass measuring approximately 3.6 x 5.1 x 5.2 cm just below the ileocecal valve, previously 4.2 x 6.5 x 5.9 cm, when measured similarly  -  Decreased size of several enlarged right lower quadrant mesenteric lymph nodes, the largest measuring 1.3 cm short axis, previously 2.1 cm.  - Trace ascites,greatest in the pericecal region, though decreased since prior.  Slightly increased conspicuity of enhancing soft tissue abnormality along the posterolateral aspect of the cecum  - Increased conspicuity of enhancing soft tissue abnormality in the pelvis between the rectum and bladder with the largest nodule measuring 2.0 x 1.4 x 1.2 cm, previously 1.7 x 1.3 x 1.0 cm.  - No appreciable suspicious hepatic lesion, though evaluation is limited by marked respiratory motion artifact.  - Right lower quadrant renal transplant with stable moderate hydronephrosis upstream of the ureteropelvic junction.  - Cholelithiasis.  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 Recommend attention on follow-up.    - 5/8/24-5/13/24 [facility]2 min  (wife found him unresponsive on bed), found to have RLE DVT, submassive saddle PE w/acute hypoxic RF, echo: at least mild RV strain, s/p mechanical thrombectomy 5/8/24 (no systemic thrombolytics 2/2 risk of bleed), started on heparin and discharged on eliquis, hgb 7.3 during admission- given 1uprbc  - 5/7/24 CT PE: Large saddle pulmonary embolism, which continues extensively into   branches of the right pulmonary artery, including proximal emboli   into the right upper, right middle, and most extensively, right   lower lobe, with likely complete occlusion of pulmonary arteries   extending into the lateral basilar segment.     4.7 x 3.7 x 2.2 cm irregular peripheral pleural-based dense   consolidation lateral right upper lobe, with penumbra of   groundglass, extending to the hilum, favored to represent   evolving pulmonary infarct. Mass or infiltrate could have a   similar appearance.     Additional areas of scattered groundglass, and small irregular   opacities, example (series 5):     - 2.2 x 1.5 cm patchy consolidation central right middle   lobe, image 141     - 2 x 1.8 cm posterior pleural-based left lung apex, image 40     may also represent additional infarct, pulmonary metastasis, or   focal pneumonitis.     Scattered additional small irregular opacities are seen   bilaterally, most extensively in the central right lung.     A prior small to moderate right pleural effusion is resolved.   Irregular patchy left lower lobe pleural-based consolidation   appears similar to prior, with a small pleural effusion, which is   mildly decreased.   - Main   pulmonary artery 4 cm, markedly enlarged. Dilated right ventricle   with ventricular strain pattern. Likely concentric LVH. No   pericardial effusion. Small mediastinal lymph nodes, and   borderline subcarinal node measuring up to 12 mm, unchanged.      1. Large saddle pulmonary embolism, with most extensive emboli   burden extending into branches of the right  pulmonary artery,   with cyst occlusion of right lower lobe anterior basilar segment   pulmonary arteries. Findings were discussed with Dr. Tiffanie Arndt of the emergency service at 11:45 AM central time on   5/8/2024, who verbalized understanding.     2. Multiple patchy consolidations and scattered small irregular   opacities with regional groundglass, the largest in the lateral   subpleural right upper lobe, suspect evolving pulmonary infarcts.   Pulmonary metastases, or focal infiltrates could have a similar   appearance.     3. Resolved prior right pleural effusion, with persistent   pleural-based consolidation in the medial left lower lobe, with   small effusion (slightly decreased).     4. Cholelithiasis.     5. Markedly enlarged pulmonary artery, consistent with elevated   pulmonary arterial pressures.     6. Multichamber cardiac enlargement, biventricular, with   concentric LVH.     7. Right subclavian portacatheter.     - 5/8/24 b/l LE doppler: Acute partially occlusive thrombus within one of   the posterior tibial veins at the right mid calf. This is part of   the deep venous system   - 5/8/24 mechanical thrombectomy: evacuated a large amount   of thrombus including the saddle thrombus and occlusive lobar and   segmental thrombus. Follow-up angiogram demonstrates excellent flow through the main, right, lobar and segmental pulmonary arteries.   REVIEW OF SYSTEMS:   A 14 point ROS was reviewed with pertinent positives and negatives in the HPI.        HOME MEDICATIONS:  Current Outpatient Medications   Medication Sig Dispense Refill     albuterol (PROAIR HFA/PROVENTIL HFA/VENTOLIN HFA) 108 (90 Base) MCG/ACT inhaler INHALE 2 PUFFS BY INHALATION EVERY 6 HOURS AS NEEDED FOR SHORTNESS OF BREATH, FOR SECRETIONS       amLODIPine (NORVASC) 10 MG tablet Take 10 mg by mouth daily       apixaban ANTICOAGULANT (ELIQUIS) 5 MG tablet 10 mg       carvedilol (COREG) 12.5 MG tablet Take 12.5 mg by mouth 2 times daily        insulin aspart U-10, diluted conc 10 units/mL, (NOVOLOG) 10 unit/ml injection Inject Subcutaneous 3 times daily (before meals) 10-16 units       INSULIN GLARGINE SC Inject 34 Units Subcutaneous at bedtime 7/11 am       ipratropium - albuterol 0.5 mg/2.5 mg/3 mL (DUONEB) 0.5-2.5 (3) MG/3ML neb solution INHALE 3ML IN NEBULIZER BY INHALATION FOUR TIMES A DAY AS NEEDED FOR SHORTNESS OF BREATH       lidocaine-prilocaine (EMLA) 2.5-2.5 % external cream Apply topically as needed for moderate pain 30 g 1     loratadine (CLARITIN) 10 MG tablet 10 mg       Magnesium Oxide 420 MG TABS Take 1 tablet by mouth 2 times daily       mycophenolate, IDS 3865, (CELLCEPT) 250 MG capsule Take 2 tablets by mouth 2 times daily       OMEPRAZOLE PO Take 1 tablet by mouth daily       ondansetron (ZOFRAN) 8 MG tablet Take 1 tablet (8 mg) by mouth every 8 hours as needed for nausea (vomiting) 30 tablet 2     ondansetron (ZOFRAN) 8 MG tablet Take 1 tablet (8 mg) by mouth every 8 hours as needed for nausea (vomiting) 30 tablet 2     predniSONE (DELTASONE) 10 MG tablet Take 40 mg by mouth daily       prochlorperazine (COMPAZINE) 10 MG tablet Take 1 tablet (10 mg) by mouth every 6 hours as needed for nausea or vomiting 30 tablet 2     prochlorperazine (COMPAZINE) 10 MG tablet Take 1 tablet (10 mg) by mouth every 6 hours as needed for nausea or vomiting 30 tablet 2     tacrolimus (PROGRAF-GENERIC EQUIVALENT) 5 MG capsule Take 5 mg by mouth daily       tamsulosin (FLOMAX) 0.4 MG capsule Take 0.4 mg by mouth daily       VITAMIN D, CHOLECALCIFEROL, PO Take 1 tablet by mouth once a week           ALLERGIES:  No Known Allergies      PAST MEDICAL HISTORY:  Past Medical History:   Diagnosis Date     Diabetic retinopathy (H)      GERD (gastroesophageal reflux disease)      Hypertension      Renal disease     renal transplant     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     Sleep apnea     has equipment  not always uses     Type 2 diabetes mellitus without  "complications (H)          PAST SURGICAL HISTORY:  Past Surgical History:   Procedure Laterality Date     EYE SURGERY  7/12/2016    CE IOL PPV RE     INSERT PORT VASCULAR ACCESS Right 2/28/2024    Procedure: Insert port vascular access;  Surgeon: Damián Estrada MD;  Location: [facility] OR      CHEST PORT PLACEMENT > 5 YRS OF AGE  2/28/2024     PANRETINAL PHOTOCOAGULATION (PRP) OD (RIGHT EYE)      last 9/18/13     PANRETINAL PHOTOCOAGULATION (PRP) OS (LEFT EYE)  4/15/14    [facility]     PHACOEMULSIFICATION CLEAR CORNEA WITH STANDARD IOL, VITRECTOMY PARSPLANA 25 GAGUE, COMBINED Right 7/12/2016    Procedure: COMBINED PHACOEMULSIFICATION CLEAR CORNEA WITH STANDARD INTRAOCULAR LENS IMPLANT, VITRECTOMY PARSPLANA 25 GAUGE;  Surgeon: Mireille Don MD;  Location: [facility]         SOCIAL HISTORY:  Social History     Socioeconomic History     Marital status:      Spouse name: Not on file     Number of children: Not on file     Years of education: Not on file     Highest education level: Not on file   Occupational History     Not on file   Tobacco Use     Smoking status: Former     Smokeless tobacco: Never   Substance and Sexual Activity     Alcohol use: Not on file     Drug use: Not on file     Sexual activity: Not on file   Other Topics Concern     Not on file   Social History Narrative     Not on file     Social Determinants of Health     Financial Resource Strain: Not on file   Food Insecurity: Not on file   Transportation Needs: Not on file   Physical Activity: Not on file   Stress: Not on file   Social Connections: Not on file   Interpersonal Safety: Not on file   Housing Stability: Not on file         FAMILY HISTORY:  Family History   Problem Relation Age of Onset     Glaucoma No family hx of      Macular Degeneration No family hx of          PHYSICAL EXAM:  Vital signs:  BP (!) 143/72 (BP Location: Right arm, Patient Position: Chair, Cuff Size: Adult Large)   Pulse 79   Ht 1.778 m (5' 10\")   " Wt 102.1 kg (225 lb)   SpO2 98%   BMI 32.28 kg/m       ECO  GENERAL/CONSTITUTIONAL: No acute distress. In wheelchair  EYES: Pupils are equal and round. Extraocular movements intact without nystagmus.  No scleral icterus.  RESPIRATORY: Equal chest rise.   MUSCULOSKELETAL: Warm and well-perfused, no cyanosis, clubbing. Right LE 2+ pitting edema to knee, left LE 1+ pitting edema to mid tibia  NEUROLOGIC: Cranial nerves are grossly intact. Alert, oriented to person, place and time, answers questions appropriately.  INTEGUMENTARY: No rashes or jaundice. Left tibia hyperpigmentation           LABS:    PATHOLOGY:      IMAGING:      ASSESSMENT/PLAN:  Berny Estrada is a 74 year old male with:      # moderately differentiated cecum adenocarcinoma, MSI-high (loss of MLH1 and PMS2, MLH1 promoter hypermethylated), BRAF V600 mutation positive  -23 diagnostic colonoscopy: Fungating partially obstructing, partially circumferential (involving two thirds of the lumen circumference) large mass found in the cecum, including IC valve, with oozing, PATHOLOGY: COLON, CECUM MASS, BIOPSY: Adenocarcinoma, moderately differentiated, Loss of expression of MLH1 and PMS2; intact expression of MSH2 and MSH6, MLH1 promoter methylation: POSITIVE ; BRAF V600 mutation positive (not Prasad syndrome)  - CRC NGS: Detected Alterations of Known or Potential Pathogenicity: BRAF V600E, TMB Score: 73.131 mut/Mb  -23 CT CAP without contrast: Cecal mass with enlarged nodes of the right lower quadrant including a 2.1 x 1.7 cm lymph node, which are suspicious for metastatic disease. Indeterminate thickening and nodularity of the peritoneal surfaces near the cecal mass could be due to fluid or mesenteric spread of disease. Indeterminate 1.6 cm lesion of the left native kidney. Small left-sided pleural effusion.  -23 CEA 10,  23 CEA 31.3,  CEA 48.5    - MR peritoneum: no peritoneal disease, Hypoenhancing cecal mass measuring  approximately 4.0 x 5.8 x 5.7 cm just below the ileocecal Valve, Enlarged RLQ/pericecal mesenteric lymph nodes measuring up to 2.0 cm short axis     TREATMENT  - pt has at least stage 3 disease with enlarged pericecal mesenteric LN  - d/w Dr. Reid, will attempt to give chemotherapy up front for approximately 3 months and then restage to see if he is a surgical candidate  - ECOG 2 at baseline at best   - for right sided tumors w/BRAF mutation, FOLFIRINOX or FOLFIRI is used w/avastin, however given pts co-morbidities, I do not think the patient could tolerate this regimen. Will avoid immunotherapy given pt is on immunosuppressive medications for renal transplant     REGIMEN:  Infusional 5FU and LV  q14 days, up to 24 weeks of perioperative chemo total   C1D1 3/6/24, C2D1 4/4/24, C3D1 4/18/24, C4D1 5/2/24  LV 350mg/m2 day 1  5FU 1200mg/m2 continuous infusion day 1-2 (total 2,400mg/m2 IV over 46-48 hours)  Emetic risk: low  Febrile neutropenia risk: low    C5D1 5/16/24 planned pending labs    Treatment related AE:  - weakness- C1D2- 3/8/24- 3/10/24 admission at [facility] for mechanical fall w/facial fractures and laceration requiring stitches, improved now  - hyperglycemia- BS still in 300s despite discontinuing dexamethasone (on prednisone 20mg PO daily for renal transplant), endocrine consult  - macrocytic anemia- 5/11/24 hgb 7.3 ([facility], [facility], submassive saddle PE)- given 1 uprbc; 5/16/24 hgb 8.7, check ferritin, iron studies, B12, RBC folate  - thrombocytopenia- 5/16/24 plt 131K, monitor while on eliquis  - GUANACO- 5/13/24 Cr 1.9, 5/16/24 Cr 2.47, IVF today and tomorrow (if needed), repeat labs tomorrow   - hyperkalemia- 5/16/24 K 5.5, EKG- sinus, TX normal, QRS normal, T waves tented but not peaked, unable to give insulin (not available), will give high dose albuterol 10mg for reversal, avoid diuretics w/GUANACO, repeat labs tomorrow     IMAGING:  - 4/24/24 CT CAP w/o contrast 2/2 kidney  function  1.  Grossly unchanged size/appearance of the cecal mass with decreased adjacent right lower quadrant mesenteric lymph nodes (10 mm lymph node previously measured 18 mm when measured similarly).  2.  Slightly increased size of a few mesorectal lymph nodes versus dependent peritoneal deposits in the pelvis, suspicious for metastatic disease. (For instance 12 mm nodules both previously measured 9 mm)    -4/24/24 MR peritoneum w/wo contrast:  - Hypoenhancing cecal mass measuring approximately 3.6 x 5.1 x 5.2 cm just below the ileocecal valve, previously 4.2 x 6.5 x 5.9 cm, when measured similarly  -  Decreased size of several enlarged right lower quadrant  mesenteric lymph nodes, the largest measuring 1.3 cm short axis, previously 2.1 cm.  - Trace ascites,greatest in the pericecal region, though decreased since prior.  - Slightly increased conspicuity of enhancing soft tissue abnormality  along the posterolateral aspect of the cecum  - Increased conspicuity of enhancing soft tissue abnormality in the  pelvis between the rectum and bladder with the largest nodule measuring 2.0 x 1.4 x 1.2 cm, previously 1.7 x 1.3 x 1.0 cm.    TUMOR MARKERS:  12/21/23 CEA 31.3  1/18/24 CEA 48.5 (prior to starting tx)  4/18/24 CEA 24.5    Other:  - pt is not surgical candidate at this time  - given increase in soft tissue mass b/w rectum and bladder, will discuss case at [facility] tumor board on 5/20/24 (primary mass, RLQ mesenteric LN and tumor markers decreasing)  - cannot add avastin 2/2 DVT, submassive PE on eliquis  - cannot add immunotherapy 2/2 renal transplant (pt on chronic steroids, prednisone 20mg PO daily, off cellcept and tacrolimus)    #submassive PE s/p thrombectomy  #RLE DVT  - 5/8/24-5/13/24 [facility] x2 min (wife found him unresponsive on bed), found to have RLE DVT, submassive saddle PE w/acute hypoxic RF, echo: at least mild RV strain, s/p mechanical thrombectomy 5/8/24 (no systemic  thrombolytics 2/2 risk of bleed), started on heparin and discharged on eliquis; hgb 7.3 during admission- given 1uprbc  - 5/7/24 CT PE:   Large saddle pulmonary embolism, with most extensive emboli burden extending into branches of the right pulmonary artery,   with cyst occlusion of right lower lobe anterior basilar segment   pulmonary arteries.Multiple patchy consolidations and scattered small irregular  opacities with regional groundglass, the largest in the lateral   subpleural right upper lobe, suspect evolving pulmonary infarcts.   Pulmonary metastases, or focal infiltrates could have a similar   appearance. Persistent pleural-based consolidation in the medial left lower lobe, with  small effusion (slightly decreased). Markedly enlarged pulmonary artery, consistent with elevated  pulmonary arterial pressures. Multichamber cardiac enlargement, biventricular, with  concentric LVH.   - 5/8/24 b/l LE doppler: Acute partially occlusive thrombus within one of   the posterior tibial veins at the right mid calf. This is part of   the deep venous system   - 5/8/24 mechanical thrombectomy: evacuated a large amount   of thrombus including the saddle thrombus and occlusive lobar and   segmental thrombus. Follow-up angiogram demonstrates excellent flow through the main, right, lobar and segmental pulmonary arteries.     PLAN:  - pt currently on eliquis 5mg BID    #macrocytic anemia  - per Care Everywhere labs:  12/23 labs  Hgb 7.9-8.9, MCV   Ferritin 1827, iron sat 60, TIBC 111, iron 67  Haptoglobin 177, absolute retic 0.1011  - 1/24 labs  Hgb 11.2, MCV 98  Ferritin 451, iron sat 39, TIBC 179, iron 69  B12 1082, RBC folate >1312  TSH 2.77  CMP Cr 1.72, GFR 41, calcium 9.6, total protein 7.1, albumin 3.8, total bili 0.3, absolute retic 0.111  - 3/24   SPEP and SIFE no monoclonal protein  Kappa 5.52, lambda 3.64, k/l ratio 1.52 normal  Quantitative Ig NA    PLAN:  - macrocytosis likely 2/2 elevated retic  - anemia 2/2  CKD, chemotherapy  - no reversible nutritional deficiencies previously, given 5/24 hgb 7.3 during admission- given 1uprbc, will repeat work up as above    #renal transplant  - [facility]  -  S[facility] cadaveric transplant in [facility] in 2014   - on tacrolimus and prednisone 10 mg daily (off cellcept since 12/23)  - 12/23 Cr 1.6, GFR 45  - 1/24 MRI peritoneum: Kidneys: Atrophic native kidneys. T1 hyperintense, non-enhancing hemorrhagic/proteinaceous cyst in the posterior mid pole of the left kidney. T2 hyperintense, nonenhancing right renal cortical cysts. Right lower quadrant renal transplant with stable moderate hydronephrosis upstream of the ureterovesical junction. No suspicious renal transplant lesion.  - 2/24 I discussed the above with the pts nephrologist Dr. Cat Goodson  She confirms pt is off of cellcept   His tacrolimus was discontinued 2/24, when pt was admitted for influenza and his prednisone was increased to 20mg  Plan to decrease prednisone back to 10mg when starting chemotherapy  Ok to proceed with 5FU and LV  If absolutely need to use avastin, can do this, need to watch kidney function/vasculature closely   Will start with 5FU/LV and see how pt tolerates. If not surgical candidate based on repeat scans, can add avastin at that time and have close follow up with nephrology.    PLAN:  - pt off tacrolimus and cellcept, currently on prednisone 20mg PO daily   - continue follow up with Dr. 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        Sincerely,        SHAUNNA BELL, DO

## 2024-05-17 ENCOUNTER — LAB (OUTPATIENT)
Dept: INFUSION THERAPY | Facility: CLINIC | Age: 74
End: 2024-05-17
Attending: NURSE PRACTITIONER
Payer: MEDICARE

## 2024-05-17 VITALS
SYSTOLIC BLOOD PRESSURE: 129 MMHG | TEMPERATURE: 99.6 F | HEART RATE: 78 BPM | RESPIRATION RATE: 16 BRPM | DIASTOLIC BLOOD PRESSURE: 71 MMHG | OXYGEN SATURATION: 95 %

## 2024-05-17 DIAGNOSIS — C18.0 MALIGNANT NEOPLASM OF CECUM (H): Primary | ICD-10-CM

## 2024-05-17 LAB
ALBUMIN SERPL BCG-MCNC: 3.2 G/DL (ref 3.5–5.2)
ALP SERPL-CCNC: 56 U/L (ref 40–150)
ALT SERPL W P-5'-P-CCNC: 16 U/L (ref 0–70)
ANION GAP SERPL CALCULATED.3IONS-SCNC: 9 MMOL/L (ref 7–15)
AST SERPL W P-5'-P-CCNC: 16 U/L (ref 0–45)
BILIRUB SERPL-MCNC: 0.5 MG/DL
BUN SERPL-MCNC: 33.3 MG/DL (ref 8–23)
CALCIUM SERPL-MCNC: 9 MG/DL (ref 8.8–10.2)
CHLORIDE SERPL-SCNC: 101 MMOL/L (ref 98–107)
CREAT SERPL-MCNC: 2.04 MG/DL (ref 0.67–1.17)
DEPRECATED HCO3 PLAS-SCNC: 26 MMOL/L (ref 22–29)
EGFRCR SERPLBLD CKD-EPI 2021: 34 ML/MIN/1.73M2
GLUCOSE SERPL-MCNC: 199 MG/DL (ref 70–99)
POTASSIUM SERPL-SCNC: 5 MMOL/L (ref 3.4–5.3)
PROT SERPL-MCNC: 5.9 G/DL (ref 6.4–8.3)
SODIUM SERPL-SCNC: 136 MMOL/L (ref 135–145)
VIT B12 SERPL-MCNC: 838 PG/ML (ref 232–1245)

## 2024-05-17 PROCEDURE — 36591 DRAW BLOOD OFF VENOUS DEVICE: CPT | Performed by: INTERNAL MEDICINE

## 2024-05-17 PROCEDURE — 84460 ALANINE AMINO (ALT) (SGPT): CPT | Performed by: INTERNAL MEDICINE

## 2024-05-17 PROCEDURE — 96360 HYDRATION IV INFUSION INIT: CPT

## 2024-05-17 PROCEDURE — 99207 PR NO CHARGE LOS: CPT

## 2024-05-17 PROCEDURE — 258N000003 HC RX IP 258 OP 636: Performed by: INTERNAL MEDICINE

## 2024-05-17 PROCEDURE — 84450 TRANSFERASE (AST) (SGOT): CPT | Performed by: INTERNAL MEDICINE

## 2024-05-17 RX ORDER — HEPARIN SODIUM (PORCINE) LOCK FLUSH IV SOLN 100 UNIT/ML 100 UNIT/ML
5 SOLUTION INTRAVENOUS EVERY 8 HOURS
Status: DISCONTINUED | OUTPATIENT
Start: 2024-05-17 | End: 2024-05-17 | Stop reason: HOSPADM

## 2024-05-17 RX ADMIN — SODIUM CHLORIDE 1000 ML: 9 INJECTION, SOLUTION INTRAVENOUS at 13:51

## 2024-05-17 ASSESSMENT — PAIN SCALES - GENERAL: PAINLEVEL: NO PAIN (0)

## 2024-05-17 NOTE — PROGRESS NOTES
Infusion Nursing Note:  Berny Estrada presents today for IVF.    Patient seen by provider today: No   present during visit today: Not Applicable.    Note: Per Dr. Blunt proceed with fluids today due to creatinine still elevated at 2.04.    Y site connected to patient's port for the fluorouracil pump to remain infusing during IVF infusion. Y site removed post IV fluids and Dosi-fuser pump reconnected to port line per protocol.     Intravenous Access:  Implanted Port.    Treatment Conditions:  Lab Results   Component Value Date     05/17/2024    POTASSIUM 5.0 05/17/2024    CR 2.04 (H) 05/17/2024    VIVI 9.0 05/17/2024    BILITOTAL 0.5 05/17/2024    ALBUMIN 3.2 (L) 05/17/2024    ALT 16 05/17/2024    AST 16 05/17/2024       Results reviewed, labs MET treatment parameters, ok to proceed with treatment.    Post Infusion Assessment:  Patient tolerated infusion without incident.  Blood return noted pre and post infusion.  Site patent and intact, free from redness, edema or discomfort.  No evidence of extravasations.    Continuous home infusion Dosi-Fuser pump remains connected.    All connectors secured in place and clamps re-taped open. Capillary element remained taped to pt's skin per protocol.  Pump Connection double checked with VIPUL Morse    Discharge Plan:   Discharge instructions reviewed with: Patient.  Patient and/or family verbalized understanding of discharge instructions and all questions answered.  Patient discharged in stable condition accompanied by: daughter.  Departure Mode: Wheelchair.  Future appts have been reviewed and crosschecked with appt note and plan.    Glenys Sebastian RN

## 2024-05-20 ENCOUNTER — APPOINTMENT (OUTPATIENT)
Dept: ONCOLOGY | Facility: CLINIC | Age: 74
End: 2024-05-20
Payer: MEDICARE

## 2024-05-20 NOTE — TUMOR CONFERENCE
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; MLH1 promoter methylation: POSITIVE)  Treatment to Date: Chemotherapy  Clinical Trials: Not discussed  Genetic Testing Discussed/Recommended?: No  Supportive Care Services Discussed/Recommended?: No  Recommended Plan: Follows evidence-based guidelines (Comment: not a surgical candidate r/t recent PE, chemo vs immunotherapy)  Did the review exceed 30 minutes?: did not           Documentation / Disclaimer Cancer Tumor Board Note  Cancer tumor board recommendations do not override what is determined to be reasonable care and treatment, which is dependent on the circumstances of a patient's case; the patient's medical, social, and personal concerns; and the clinical judgment of the oncologist [physician].

## 2024-05-22 ENCOUNTER — TELEPHONE (OUTPATIENT)
Dept: ONCOLOGY | Facility: CLINIC | Age: 74
End: 2024-05-22
Payer: MEDICARE

## 2024-05-22 NOTE — TELEPHONE ENCOUNTER
----- Message from Dedra Deal RN sent at 5/21/2024  8:21 AM CDT -----  Regarding: FW: ROXANNE    ----- Message -----  From: Stacey Blunt DO  Sent: 5/20/2024   9:41 AM CDT  To: Dedra Deal RN; Gayatri Saucedo RN  Subject: RE: ROXANNE                                          Thank you    Katarzyna, can you check in on him and let me know how he is doing?    TY  SK  ----- Message -----  From: Gayatri Saucedo, VIPUL  Sent: 5/18/2024   8:26 PM CDT  To: Stacey Blunt DO  Subject: ROXANNE                                              Hi Dr. Blunt, I saw Berny today for his pump disconnect and just wanted to let you know his O2 sats were pretty low, ranging form 86%-95% (with deep breaths). I encouraged him and his wife to go to ER for further assessment but I'm not confident they are going to go in... see my note below-     VSS except O2 sats low, ranging from 86% on room air up to 95% (when taking deep breaths). pt does endorse SOB w/ exertion but states he's breathing comfortably at rest. pt was recently admitted with PE and discharged on eliquis, pt confirms he has not missed any doses. denies chest pain. breath sounds have some crackles in LLL and expiratory wheezing in RUL. pt admits he has not been using his incentive spiromoter as he was instructed to do. denies issues with nausea/vomiting, no fevers/chills, no falls and states he's been ambulating with walker without issue. pt does have pulse ox at home and states his O2 sats are usually in the low to mid 90's. writer encouraged pt/wife to go in to ER for further assessment of low oxygenation and possibly worsening PE. pt hesitant to go in but wife states she will check his O2 frequently the rest of the day and take him in if necessary. port flushed with saline and deaccessed without issue.    Thanks!    Gayatri Saucedo RN BSN  [facility]  [phone]  [email or web address]

## 2024-05-22 NOTE — TELEPHONE ENCOUNTER
Spoke to patient's wife via telephone. She states that he seems to be a lot more tired this week. Though they deny chest pain, SOB, difficulties breathing.  Keeping an eye on oxygen that's ranging from 87-93. Patient has an appointment with PCP today to follow up - appt with oncologist next week.    They know to reach out with any concerns in the mean time.    Katarzyna Deal, RN, BSN  RN Care Coordinator - Oncology/Hematology  [facility]

## 2024-05-30 LAB — TSH SERPL-ACNC: 2.65 UIU/ML (ref 0.35–4.94)

## 2024-05-30 NOTE — CONFIDENTIAL NOTE
[facility]    Hematology/Oncology Established Patient Follow-Up Note      Today's Date: 5/30/2024    Reason for follow-up: cecal adenocarcinoma       Pt unable to make appt today  [facility]  To be rescheduled    Stacey DO Jose Raul  Hematology/Oncology  [facility]

## 2024-06-17 ENCOUNTER — ONCOLOGY VISIT (OUTPATIENT)
Dept: ONCOLOGY | Facility: CLINIC | Age: 74
End: 2024-06-17
Attending: INTERNAL MEDICINE
Payer: MEDICARE

## 2024-06-17 ENCOUNTER — INFUSION THERAPY VISIT (OUTPATIENT)
Dept: INFUSION THERAPY | Facility: CLINIC | Age: 74
End: 2024-06-17
Attending: INTERNAL MEDICINE
Payer: MEDICARE

## 2024-06-17 VITALS
BODY MASS INDEX: 32.24 KG/M2 | SYSTOLIC BLOOD PRESSURE: 126 MMHG | TEMPERATURE: 98.2 F | WEIGHT: 225.2 LBS | OXYGEN SATURATION: 96 % | HEART RATE: 87 BPM | RESPIRATION RATE: 16 BRPM | HEIGHT: 70 IN | DIASTOLIC BLOOD PRESSURE: 72 MMHG

## 2024-06-17 DIAGNOSIS — C18.0 MALIGNANT NEOPLASM OF CECUM (H): ICD-10-CM

## 2024-06-17 DIAGNOSIS — T45.1X5A ANTINEOPLASTIC CHEMOTHERAPY INDUCED ANEMIA: ICD-10-CM

## 2024-06-17 DIAGNOSIS — D64.89 ANEMIA DUE TO OTHER CAUSE, NOT CLASSIFIED: Primary | ICD-10-CM

## 2024-06-17 DIAGNOSIS — E11.311 TYPE 2 DIABETES MELLITUS WITH RIGHT EYE AFFECTED BY RETINOPATHY AND MACULAR EDEMA, WITHOUT LONG-TERM CURRENT USE OF INSULIN, UNSPECIFIED RETINOPATHY SEVERITY (H): ICD-10-CM

## 2024-06-17 DIAGNOSIS — Z94.0 KIDNEY REPLACED BY TRANSPLANT: ICD-10-CM

## 2024-06-17 DIAGNOSIS — C18.0 MALIGNANT NEOPLASM OF CECUM (H): Primary | ICD-10-CM

## 2024-06-17 DIAGNOSIS — D64.81 ANTINEOPLASTIC CHEMOTHERAPY INDUCED ANEMIA: ICD-10-CM

## 2024-06-17 DIAGNOSIS — D69.6 THROMBOCYTOPENIA (H): ICD-10-CM

## 2024-06-17 DIAGNOSIS — R60.0 BILATERAL LOWER EXTREMITY EDEMA: ICD-10-CM

## 2024-06-17 LAB
ALBUMIN SERPL BCG-MCNC: 3.3 G/DL (ref 3.5–5.2)
ALP SERPL-CCNC: 52 U/L (ref 40–150)
ALT SERPL W P-5'-P-CCNC: 15 U/L (ref 0–70)
ANION GAP SERPL CALCULATED.3IONS-SCNC: 7 MMOL/L (ref 7–15)
AST SERPL W P-5'-P-CCNC: 13 U/L (ref 0–45)
BASOPHILS # BLD AUTO: 0 10E3/UL (ref 0–0.2)
BASOPHILS NFR BLD AUTO: 0 %
BILIRUB SERPL-MCNC: 0.3 MG/DL
BUN SERPL-MCNC: 52.5 MG/DL (ref 8–23)
CALCIUM SERPL-MCNC: 9.8 MG/DL (ref 8.8–10.2)
CEA SERPL-MCNC: 14.7 NG/ML
CHLORIDE SERPL-SCNC: 105 MMOL/L (ref 98–107)
CREAT SERPL-MCNC: 2.07 MG/DL (ref 0.67–1.17)
DEPRECATED HCO3 PLAS-SCNC: 27 MMOL/L (ref 22–29)
EGFRCR SERPLBLD CKD-EPI 2021: 33 ML/MIN/1.73M2
EOSINOPHIL # BLD AUTO: 0 10E3/UL (ref 0–0.7)
EOSINOPHIL NFR BLD AUTO: 0 %
ERYTHROCYTE [DISTWIDTH] IN BLOOD BY AUTOMATED COUNT: 17.3 % (ref 10–15)
GLUCOSE SERPL-MCNC: 177 MG/DL (ref 70–99)
HCT VFR BLD AUTO: 24.7 % (ref 40–53)
HCT VFR BLD AUTO: 24.7 % (ref 40–53)
HGB BLD-MCNC: 7.6 G/DL (ref 13.3–17.7)
IMM GRANULOCYTES # BLD: 0.2 10E3/UL
IMM GRANULOCYTES NFR BLD: 2 %
LYMPHOCYTES # BLD AUTO: 1.3 10E3/UL (ref 0.8–5.3)
LYMPHOCYTES NFR BLD AUTO: 15 %
MCH RBC QN AUTO: 33.2 PG (ref 26.5–33)
MCHC RBC AUTO-ENTMCNC: 30.8 G/DL (ref 31.5–36.5)
MCV RBC AUTO: 108 FL (ref 78–100)
MONOCYTES # BLD AUTO: 0.5 10E3/UL (ref 0–1.3)
MONOCYTES NFR BLD AUTO: 6 %
NEUTROPHILS # BLD AUTO: 6.6 10E3/UL (ref 1.6–8.3)
NEUTROPHILS NFR BLD AUTO: 76 %
NRBC # BLD AUTO: 0.1 10E3/UL
NRBC BLD AUTO-RTO: 1 /100
PLATELET # BLD AUTO: 119 10E3/UL (ref 150–450)
POTASSIUM SERPL-SCNC: 4.8 MMOL/L (ref 3.4–5.3)
PROT SERPL-MCNC: 6.3 G/DL (ref 6.4–8.3)
RBC # BLD AUTO: 2.29 10E6/UL (ref 4.4–5.9)
SODIUM SERPL-SCNC: 139 MMOL/L (ref 135–145)
WBC # BLD AUTO: 8.7 10E3/UL (ref 4–11)

## 2024-06-17 PROCEDURE — 250N000011 HC RX IP 250 OP 636: Mod: JZ | Performed by: INTERNAL MEDICINE

## 2024-06-17 PROCEDURE — 80053 COMPREHEN METABOLIC PANEL: CPT | Performed by: INTERNAL MEDICINE

## 2024-06-17 PROCEDURE — 85014 HEMATOCRIT: CPT

## 2024-06-17 PROCEDURE — 36591 DRAW BLOOD OFF VENOUS DEVICE: CPT | Performed by: INTERNAL MEDICINE

## 2024-06-17 PROCEDURE — 99207 PR NO CHARGE LOS: CPT

## 2024-06-17 PROCEDURE — 85025 COMPLETE CBC W/AUTO DIFF WBC: CPT | Performed by: INTERNAL MEDICINE

## 2024-06-17 PROCEDURE — 82378 CARCINOEMBRYONIC ANTIGEN: CPT

## 2024-06-17 PROCEDURE — 99214 OFFICE O/P EST MOD 30 MIN: CPT

## 2024-06-17 PROCEDURE — 82747 ASSAY OF FOLIC ACID RBC: CPT

## 2024-06-17 PROCEDURE — G0463 HOSPITAL OUTPT CLINIC VISIT: HCPCS

## 2024-06-17 RX ORDER — DIPHENHYDRAMINE HYDROCHLORIDE 50 MG/ML
50 INJECTION INTRAMUSCULAR; INTRAVENOUS
Start: 2024-06-17

## 2024-06-17 RX ORDER — SULFAMETHOXAZOLE/TRIMETHOPRIM 800-160 MG
1 TABLET ORAL
COMMUNITY

## 2024-06-17 RX ORDER — EPINEPHRINE 1 MG/ML
0.3 INJECTION, SOLUTION INTRAMUSCULAR; SUBCUTANEOUS EVERY 5 MIN PRN
OUTPATIENT
Start: 2024-06-17

## 2024-06-17 RX ORDER — HEPARIN SODIUM (PORCINE) LOCK FLUSH IV SOLN 100 UNIT/ML 100 UNIT/ML
5 SOLUTION INTRAVENOUS
OUTPATIENT
Start: 2024-06-17

## 2024-06-17 RX ORDER — HEPARIN SODIUM,PORCINE 10 UNIT/ML
5-20 VIAL (ML) INTRAVENOUS DAILY PRN
OUTPATIENT
Start: 2024-06-17

## 2024-06-17 RX ORDER — HEPARIN SODIUM (PORCINE) LOCK FLUSH IV SOLN 100 UNIT/ML 100 UNIT/ML
5 SOLUTION INTRAVENOUS
Status: DISCONTINUED | OUTPATIENT
Start: 2024-06-17 | End: 2024-06-17 | Stop reason: HOSPADM

## 2024-06-17 RX ADMIN — Medication 5 ML: at 10:41

## 2024-06-17 ASSESSMENT — PAIN SCALES - GENERAL: PAINLEVEL: NO PAIN (0)

## 2024-06-17 NOTE — PROGRESS NOTES
Port needle left for access for treatment. Sterile technique performed and maintained. Patient tolerated procedure well. Tubes drawn in rainbow order. Tubes labeled and signed. Transparent dressing placed with use of heparin.     Katerina Mcintyre RN  [facility]

## 2024-06-17 NOTE — PROGRESS NOTES
Oncology Follow Up Visit: June 17, 2024     Oncologist: Dr. 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     Oncology HPI:  Berny Estrada is a 73 year old male with medical history including right renal transplant (2014), hypertension, type 2 diabetes with proliferative diabetic retinopathy and long-term insulin use, CKD stage III, CAD, COPD, GERD, colon polyps (tubular adenoma), esophageal candidiasis, cholelithiasis, sleep apnea, hx of tobacco use (quit 50 years ago)  - Presenting complaint of weight loss 10/23-11/23 10-15 lbs, then gain 10 over past few months and significant ECOG decline over the past 6 months, using wheelchair and supplemental oxygen  - 12/1/23- 12/9/23 hospitalized 2/2 anemia, hgb 7.9 w/melena x few months (was on iron)  - 12/6/23 CEA 11.69, 12/7/23 CEA 10  - 12/14/2023 EGD: Patchy white plaques in proximal esophagus and midesophagus suspicious for candidiasis, otherwise normal  - 12/14/2023 [facility]: Fungating partially obstructing, partially circumferential (involving two thirds of the lumen circumference) large mass found in the cecum, including IC valve, with oozing  - Adenocarcinoma, moderately differentiated         - Loss of expression of MLH1 and PMS2; intact expression of MSH2 and MSH6  - MLH1 promoter methylation: POSITIVE   - BRAF V600 mutation positive  - 4 sessile polyps in ascending colon, 3-5 mm in size - Sessile serrated adenoma with focal high-grade dysplasia and Separate minute fragment of adenocarcinoma  - 2 mm sessile polyp in transverse colon - Tubular adenoma; negative for high-grade dysplasia  - 2 sessile polyps in the ascending colon, 4-5 mm in size - Tubular adenoma; negative for high-grade dysplasia  - 6 sessile polyps in the rectosigmoid colon, 3-8 mm in size- Sessile serrated adenoma with focal cytologic dysplasia. No evidence of high-grade dysplasia or invasive  "carcinoma     Treatment:  3/21/24 started infusional 5FU and Leucovorin     Interval History:  Pt is seen in clinic today prior to C6 of 5FU and Leucovorin. He states he has been very fatigued and weak with little energy. [facility] and is now on oxygen. They are monitoring his O2 levels at home. He reports his stools have been black for several weeks, most recent BM last evening. No cramping, abdominal pain, excess flatulence, nausea or vomiting. He is on a blood thinner now for a lung and leg clot. He is otherwise okay, not lightheaded or dizzy. No additional questions or concerns today.     Patient denies any of the following except if noted above: fevers, chills, difficulty with energy, vision or hearing changes, chest pain, dyspnea, abdominal pain, nausea, vomiting, diarrhea, constipation, urinary concerns, headaches, new aches/pains, numbness, tingling, issues with sleep or mood. He also denies lumps, bumps, rashes or skin lesions, bleeding or bruising issues.     Physical Exam:  BP (!) 143/76 (BP Location: Right arm)   Pulse 74   Temp 97.9  F (36.6  C) (Pulmonary Artery)   Resp 18   Ht 1.778 m (5' 10\")   Wt 105.7 kg (233 lb)   SpO2 97%   BMI 33.43 kg/m            BP Readings from Last 6 Encounters:   05/02/24 (!) 143/76   04/04/24 (!) 153/83   03/21/24 (!) 163/81   02/28/24 (!) 173/89   02/09/24 (!) 160/77   01/18/24 (!) 153/81          Wt Readings from Last 6 Encounters:   05/02/24 105.7 kg (233 lb)   04/18/24 106.3 kg (234 lb 4.8 oz)   04/04/24 106.1 kg (234 lb)   04/04/24 104.8 kg (231 lb)   03/21/24 104.8 kg (231 lb)   02/28/24 98.9 kg (218 lb)      Constitutional: No acute distress, pleasant, appropriately groomed.   ENT: PERRLA, sclera without erythema. Not wearing dentures, a little challenging to decipher speech at times  Neck: Trachea midline, no adenopathy.   Resp: No respiratory distress, adequate depth and rate of respirations, using 2L of O2.   Cardiac: " S1/S2, RRR, no murmurs. +2 edema to BLE, wearing compression socks.   Abdomen: Obese, BS active, abdomen soft and non-tender. No masses or organomegaly.   MS:  In a wheelchair.  Skin: No rashes, lesions, or wounds on exposed skin.  Neuro: A/O x 4, sensation intact.   Psych: Appropriate mentation and affect.     Laboratory Results:   Results for orders placed or performed in visit on 06/17/24   Comprehensive metabolic panel     Status: Abnormal   Result Value Ref Range    Sodium 139 135 - 145 mmol/L    Potassium 4.8 3.4 - 5.3 mmol/L    Carbon Dioxide (CO2) 27 22 - 29 mmol/L    Anion Gap 7 7 - 15 mmol/L    Urea Nitrogen 52.5 (H) 8.0 - 23.0 mg/dL    Creatinine 2.07 (H) 0.67 - 1.17 mg/dL    GFR Estimate 33 (L) >60 mL/min/1.73m2    Calcium 9.8 8.8 - 10.2 mg/dL    Chloride 105 98 - 107 mmol/L    Glucose 177 (H) 70 - 99 mg/dL    Alkaline Phosphatase 52 40 - 150 U/L    AST 13 0 - 45 U/L    ALT 15 0 - 70 U/L    Protein Total 6.3 (L) 6.4 - 8.3 g/dL    Albumin 3.3 (L) 3.5 - 5.2 g/dL    Bilirubin Total 0.3 <=1.2 mg/dL   CBC with platelets and differential     Status: Abnormal   Result Value Ref Range    WBC Count 8.7 4.0 - 11.0 10e3/uL    RBC Count 2.29 (L) 4.40 - 5.90 10e6/uL    Hemoglobin 7.6 (LL) 13.3 - 17.7 g/dL    Hematocrit 24.7 (L) 40.0 - 53.0 %     (H) 78 - 100 fL    MCH 33.2 (H) 26.5 - 33.0 pg    MCHC 30.8 (L) 31.5 - 36.5 g/dL    RDW 17.3 (H) 10.0 - 15.0 %    Platelet Count 119 (L) 150 - 450 10e3/uL    % Neutrophils 76 %    % Lymphocytes 15 %    % Monocytes 6 %    % Eosinophils 0 %    % Basophils 0 %    % Immature Granulocytes 2 %    NRBCs per 100 WBC 1 (H) <1 /100    Absolute Neutrophils 6.6 1.6 - 8.3 10e3/uL    Absolute Lymphocytes 1.3 0.8 - 5.3 10e3/uL    Absolute Monocytes 0.5 0.0 - 1.3 10e3/uL    Absolute Eosinophils 0.0 0.0 - 0.7 10e3/uL    Absolute Basophils 0.0 0.0 - 0.2 10e3/uL    Absolute Immature Granulocytes 0.2 <=0.4 10e3/uL    Absolute NRBCs 0.1 10e3/uL   Hematocrit     Status: Abnormal   Result  Value Ref Range    Hematocrit 24.7 (L) 40.0 - 53.0 %   CBC with platelets differential     Status: Abnormal    Narrative    The following orders were created for panel order CBC with platelets differential.  Procedure                               Abnormality         Status                     ---------                               -----------         ------                     CBC with platelets and d...[[id number]]  Abnormal            Final result                 Please view results for these tests on the individual orders.   Folate RBC     Status: Abnormal (In process)    Narrative    The following orders were created for panel order Folate RBC.  Procedure                               Abnormality         Status                     ---------                               -----------         ------                     RBC Folate[[id number]]                                       In process                 Hematocrit[[id number]]                   Abnormal            Final result                 Please view results for these tests on the individual orders.     I reviewed the above labs today.     Imaging:  CT Chest Abdomen Pelvis w/o Contrast  Narrative: EXAM: CT CHEST ABDOMEN PELVIS W/O CONTRAST  LOCATION: [facility]  DATE: 4/24/2024     INDICATION: Hx of cecal adenocarcinoma on current chemo treatment, needs reevaluation of treatment  COMPARISON: CT abdomen pelvis 12/21/2023  TECHNIQUE: CT scan of the chest, abdomen, and pelvis was performed without IV contrast. Multiplanar reformats were obtained. Dose reduction techniques were used.   CONTRAST: None.     FINDINGS:   LUNGS AND PLEURA: Central airways are patent. Left lower lobe atelectasis/scarring, unchanged. Scattered calcified granulomas and tiny pulmonary nodules are unchanged. For instance 3 mm right middle lobe nodule adjacent to the horizontal fissure series 4   image #[id number]. No new or enlarging lung nodules. Unchanged trace left  pleural effusion.     MEDIASTINUM/AXILLAE: Right chest port catheter tip terminates in the right atrium. Stable heart size. No pericardial effusion. Thoracic aorta is nonaneurysmal with severe atheromatous disease. Unchanged calcified hilar lymph nodes from prior   granulomatous disease. No new or enlarging thoracic lymph nodes. Unchanged heterogeneity to the left thyroid without worrisome nodule.     CORONARY ARTERY CALCIFICATION: Severe.     HEPATOBILIARY: Normal unenhanced liver contours. Cholelithiasis without pericholecystic inflammation.     PANCREAS: Unremarkable.     SPLEEN: Unremarkable.     ADRENAL GLANDS: Unremarkable.     KIDNEYS/BLADDER: Atrophy of the native bilateral kidneys with vascular calcifications. Benign proteinaceous cyst in the left kidney (which requires no follow-up). No urolithiasis or hydronephrosis.     Unchanged mild pelvocaliectasis of the right lower quadrant transplant kidney. Urinary bladder is unremarkable.     BOWEL: Unchanged appearance of the cecal mass (3/448). Remainder of small and large bowel is normal in caliber. No ascites.     LYMPH NODES: Decrease in size of right lower quadrant mesenteric lymph nodes. For instance 10 mm lymph node (3/392) previously measured 18 mm when measured similarly. Slightly increased size of a few mesorectal lymph nodes versus dependent peritoneal   deposits. For instance 12 mm nodules (3/500 and 3/484) both previously measured 9 mm.     VASCULATURE: Abdominal aorta is nonaneurysmal with severe atheromatous disease.     PELVIC ORGANS: Normal contours.     MUSCULOSKELETAL: Degenerative changes throughout the spine. No destructive osseous lesions. Unchanged subcutaneous lesion in the right axilla (3/34), likely a sebaceous or epidermal inclusion cyst.  Impression: IMPRESSION:  1.  Grossly unchanged size/appearance of the cecal mass with decreased adjacent right lower quadrant mesenteric lymph nodes.  2.  Slightly increased size of a few mesorectal  lymph nodes versus dependent peritoneal deposits in the pelvis, suspicious for metastatic disease. Continued attention on follow-up is recommended.  3.  No definite new sites of disease in the chest, abdomen or pelvis.  4.  Unchanged mild pelvocaliectasis of the right lower quadrant transplant kidney.  5.  Additional details in the findings.    MR Peritoneum wo & w Contrast  Narrative: Exam: MR PERITONEUM WO & W CONTRAST, 4/24/2024 10:55 AM     Indication: Hx of cecal adenocarcinoma on current chemo treatment,  needs reevaluation of treatment; Malignant neoplasm of cecum (H)     Comparison: 12/21/2023 CT, 1/19/2024 MRI     Technique: Images were acquired with and without intravenous contrast  through the and pelvis. The following MR images were acquired:  TrueFISP, multiplanar T2 weighted, axial T1 in/out of phase, axial  fat-saturated T1, diffusion-weighted. Multiplanar T1-weighted images  with fat saturation were obtained before contrast administration and  at multiple time points following the administration of intravenous  contrast. Contrast dose: 10mL Gadavist     FINDINGS:     Liver: No hepatic side or iron deposition. No appreciable focal  hepatic lesion, though evaluation is limited by respiratory motion  artifact..      Gallbladder/biliary tree: Cholelithiasis. No gallbladder wall  thickening or pericholecystic fluid. No intra or extrahepatic biliary  dilation.     Spleen: Not enlarged.     Kidneys: Atrophic native kidneys. T1 hyperintense, nonenhancing  hemorrhagic/proteinaceous cyst in the posterior mid pole of the left  kidney. T2 hyperintense, nonenhancing right renal cortical cysts.  Right lower quadrant renal transplant with continued moderate  hydronephrosis upstream of the ureterovesical junction. No suspicious  renal transplant lesion.     Adrenal glands: Normal.     Pancreas: Moderately atrophic pancreas. No main pancreatic ductal  dilation or suspicious mass. Subcentimeter T2 hyperintense cystic  foci  in the pancreatic head and body/tail.     Bowel: No dilated small or large bowel. Hypoenhancing cecal mass  measuring approximately 3.6 x 5.1 x 5.2 cm just below the ileocecal  valve, previously 4.2 x 6.5 x 5.9 cm, when measured similarly.     Pelvis: Normal bladder. Mildly enlarged prostate.     Lymph nodes: Decreased size of several enlarged right lower quadrant  mesenteric lymph nodes, the largest measuring 1.3 cm short axis  (series 29 image 85), previously 2.1 cm.     Blood vessels: The major abdominal and pelvic vasculature is patent.     Lung bases: Stable small left pleural effusion with adjacent  compressive atelectasis. Borderline cardiomegaly.     Bones and soft tissues: No aggressive osseous lesion.     Mesentery and abdominal wall: Continued subcutaneous edema in the left  greater than right abdominal wall. Right lower quadrant abdominal wall  incisional changes with a large incisional hernia. Trace ascites,  greatest in the pericecal region, though decreased since prior.  Slightly increased conspicuity of enhancing soft tissue abnormality  along the posterolateral aspect of the cecum (series 32 images 19-30).  Increased conspicuity of enhancing soft tissue abnormality in the  pelvis between the rectum and bladder (series 32 images 40-27) with  the largest nodule measuring 2.0 x 1.4 x 1.2 cm, previously 1.7 x 1.3  x 1.0 cm.  Impression: IMPRESSION:   1. Decreased cecal mass and associated right lower quadrant mesenteric  lymphadenopathy.  2. 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 Slightly decreased trace pericecal ascites.  3. No appreciable suspicious hepatic lesion, though evaluation is  limited by marked respiratory motion artifact.  4. Right lower quadrant renal transplant with stable moderate  hydronephrosis upstream of the ureteropelvic junction.  5. Cholelithiasis.  6.  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 Recommend attention on  follow-up.     IVETTNINAMANPREET GINNY BONNIE,          SYSTEM ID:  [id number]  I reviewed the above imaging report today.        Assessment and Plan:   Adenocarcinoma of the cecum, moderately differentiated, MSI-high (loss of MLH1 and PMS2, MLH1 promoter hypermethylated), BRAF V600 mutation positive, stage 3 with enlarged pericecal mesenteric lymphadenopathy.   - Now on treatment with infusional 5FU with leucovorin, first cycle on 3/21/24  - Repeat CT CAP completed after cycle 4 with response shown in primary site and LN and CEA is decreasing but there is possible new peritoneal disease b/w the rectum and bladder   - [facility] ([facility] and then again admitted for hypoxia and discharged with oxygen) - [facility] review [facility] records.   - Patient today reporting black stools x2 weeks with fatigue and weakness. Hgb decreased to 7.6. Recently placed on Eliquis for PE/DVT. Concern is for upper GI bleed - Advised patient to go to ER for further evaluation, [facility] [place]. Handoff given to ER provider. Patient is otherwise hemodynamically stable and A/O x 4. Wife will provide transportation. Will defer treatment today and request follow-up with Dr. Blunt.      RLE DVT and saddle PE  - s/p mechanical thrombectomy on 5/8/24  - Discharged on Eliquis    Thrombocytopenia   - plts 119 today  - Recently placed on eliquis d/t DVT/PE    DM II   - Hyperglycemia s/t steroids, recent discontinuation of IV and PO dexamethasone  - BS now trending 190's versus 400's previously.   - Discussed s/s of hypoglycemia, pt is aware of these symptoms and states he has not had a low episode in a long time  - Endocrinology consult pending     BLE edema  - Overall stable  - Elevating extremities when able, wearing compression socks     Hx of renal transplant  - Previously on tacrolimus and  cellcept - both on hold  - Confirmed taking prednisone 40 mg daily  - Follows Dr. Goodson at the [facility]  ONI, CNP

## 2024-06-17 NOTE — NURSING NOTE
"Oncology Rooming Note    June 17, 2024 11:04 AM   Berny Estrada is a 74 year old male who presents for:    Chief Complaint   Patient presents with    Oncology Clinic Visit     Initial Vitals: /72 (BP Location: Right arm)   Pulse 87   Temp 98.2  F (36.8  C) (Oral)   Resp 16   Ht 1.778 m (5' 10\")   Wt 102.2 kg (225 lb 3.2 oz)   SpO2 96%   BMI 32.31 kg/m   Estimated body mass index is 32.31 kg/m  as calculated from the following:    Height as of this encounter: 1.778 m (5' 10\").    Weight as of this encounter: 102.2 kg (225 lb 3.2 oz). Body surface area is 2.25 meters squared.  No Pain (0) Comment: Data Unavailable   No LMP for male patient.  Allergies reviewed: Yes  Medications reviewed: Yes    Medications: Medication refills not needed today.  Pharmacy name entered into EPIC: [facility] - [place], [place] - [address]    Frailty Screening:   Is the patient here for a new oncology consult visit in cancer care? 2. No      Clinical concerns: No Concerns        Mara Ferris MA            "

## 2024-06-17 NOTE — Clinical Note
6/17/2024      Berny Estrada  [address] [address]  [place] [address]      Dear Colleague,    Thank you for referring your patient, Berny Estrada, to the [facility]. Please see a copy of my visit note below.    Oncology Follow Up Visit: June 17, 2024     Oncologist: Dr. 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     Oncology HPI:  Berny Estrada is a 73 year old male with medical history including right renal transplant (2014), hypertension, type 2 diabetes with proliferative diabetic retinopathy and long-term insulin use, CKD stage III, CAD, COPD, GERD, colon polyps (tubular adenoma), esophageal candidiasis, cholelithiasis, sleep apnea, hx of tobacco use (quit 50 years ago)  - Presenting complaint of weight loss 10/23-11/23 10-15 lbs, then gain 10 over past few months and significant ECOG decline over the past 6 months, using wheelchair and supplemental oxygen  - 12/1/23- 12/9/23 hospitalized 2/2 anemia, hgb 7.9 w/melena x few months (was on iron)  - 12/6/23 CEA 11.69, 12/7/23 CEA 10  - 12/14/2023 EGD: Patchy white plaques in proximal esophagus and midesophagus suspicious for candidiasis, otherwise normal  - 12/14/20[facility]: Fungating partially obstructing, partially circumferential (involving two thirds of the lumen circumference) large mass found in the cecum, including IC valve, with oozing  - Adenocarcinoma, moderately differentiated         - Loss of expression of MLH1 and PMS2; intact expression of MSH2 and MSH6  - MLH1 promoter methylation: POSITIVE   - BRAF V600 mutation positive  - 4 sessile polyps in ascending colon, 3-5 mm in size - Sessile serrated adenoma with focal high-grade dysplasia and Separate minute fragment of adenocarcinoma  - 2 mm sessile polyp in transverse colon - Tubular adenoma; negative for high-grade dysplasia  - 2  "sessile polyps in the ascending colon, 4-5 mm in size - Tubular adenoma; negative for high-grade dysplasia  - 6 sessile polyps in the rectosigmoid colon, 3-8 mm in size- Sessile serrated adenoma with focal cytologic dysplasia. No evidence of high-grade dysplasia or invasive carcinoma     Treatment:  3/21/24 started infusional 5FU and Leucovorin     Interval History:  Pt is seen in clinic today prior to C6 of 5FU and Leucovorin.       He continues to tolerate treatment well with essentially no side effects. He endorses mild constipation but has not needed to treat it. He drinks protein drinks occassionally. His appetite is good and weight has been stable. His wife states he has been slightly less dependent on family for cares and he is moving around a little more than usual. He did not make any attempts to increase his water intake. No additional questions or concerns today.     Patient denies any of the following except if noted above: fevers, chills, difficulty with energy, vision or hearing changes, chest pain, dyspnea, abdominal pain, nausea, vomiting, diarrhea, constipation, urinary concerns, headaches, new aches/pains, numbness, tingling, issues with sleep or mood. He also denies lumps, bumps, rashes or skin lesions, bleeding or bruising issues.     Physical Exam:  BP (!) 143/76 (BP Location: Right arm)   Pulse 74   Temp 97.9  F (36.6  C) (Pulmonary Artery)   Resp 18   Ht 1.778 m (5' 10\")   Wt 105.7 kg (233 lb)   SpO2 97%   BMI 33.43 kg/m            BP Readings from Last 6 Encounters:   05/02/24 (!) 143/76   04/04/24 (!) 153/83   03/21/24 (!) 163/81   02/28/24 (!) 173/89   02/09/24 (!) 160/77   01/18/24 (!) 153/81          Wt Readings from Last 6 Encounters:   05/02/24 105.7 kg (233 lb)   04/18/24 106.3 kg (234 lb 4.8 oz)   04/04/24 106.1 kg (234 lb)   04/04/24 104.8 kg (231 lb)   03/21/24 104.8 kg (231 lb)   02/28/24 98.9 kg (218 lb)      Constitutional: No acute distress, pleasant, appropriately " groomed.   ENT: PERRLA, sclera without erythema. Not wearing dentures, a little challenging to decipher speech at times  Neck: Trachea midline, no adenopathy.   Resp: CTA, adequate depth and rate of respirations.   Cardiac: S1/S2, RRR, no murmurs. +2 edema to BLE, wearing compression socks.   Abdomen: Obese, BS active, abdomen soft and non-tender. No masses or organomegaly.   MS:  Used a wheelchair.  Skin: No rashes, lesions, or wounds on exposed skin.  Neuro: A/O x 4, sensation intact.   Psych: Appropriate mentation and affect.     Laboratory Results:     I reviewed the above labs today.     Imaging:  CT Chest Abdomen Pelvis w/o Contrast  Narrative: EXAM: CT CHEST ABDOMEN PELVIS W/O CONTRAST  LOCATION: [facility]  DATE: 4/24/2024     INDICATION: Hx of cecal adenocarcinoma on current chemo treatment, needs reevaluation of treatment  COMPARISON: CT abdomen pelvis 12/21/2023  TECHNIQUE: CT scan of the chest, abdomen, and pelvis was performed without IV contrast. Multiplanar reformats were obtained. Dose reduction techniques were used.   CONTRAST: None.     FINDINGS:   LUNGS AND PLEURA: Central airways are patent. Left lower lobe atelectasis/scarring, unchanged. Scattered calcified granulomas and tiny pulmonary nodules are unchanged. For instance 3 mm right middle lobe nodule adjacent to the horizontal fissure series 4   image #129. No new or enlarging lung nodules. Unchanged trace left pleural effusion.     MEDIASTINUM/AXILLAE: Right chest port catheter tip terminates in the right atrium. Stable heart size. No pericardial effusion. Thoracic aorta is nonaneurysmal with severe atheromatous disease. Unchanged calcified hilar lymph nodes from prior   granulomatous disease. No new or enlarging thoracic lymph nodes. Unchanged heterogeneity to the left thyroid without worrisome nodule.     CORONARY ARTERY CALCIFICATION: Severe.     HEPATOBILIARY: Normal unenhanced liver contours. Cholelithiasis  without pericholecystic inflammation.     PANCREAS: Unremarkable.     SPLEEN: Unremarkable.     ADRENAL GLANDS: Unremarkable.     KIDNEYS/BLADDER: Atrophy of the native bilateral kidneys with vascular calcifications. Benign proteinaceous cyst in the left kidney (which requires no follow-up). No urolithiasis or hydronephrosis.     Unchanged mild pelvocaliectasis of the right lower quadrant transplant kidney. Urinary bladder is unremarkable.     BOWEL: Unchanged appearance of the cecal mass (3/448). Remainder of small and large bowel is normal in caliber. No ascites.     LYMPH NODES: Decrease in size of right lower quadrant mesenteric lymph nodes. For instance 10 mm lymph node (3/392) previously measured 18 mm when measured similarly. Slightly increased size of a few mesorectal lymph nodes versus dependent peritoneal   deposits. For instance 12 mm nodules (3/500 and 3/484) both previously measured 9 mm.     VASCULATURE: Abdominal aorta is nonaneurysmal with severe atheromatous disease.     PELVIC ORGANS: Normal contours.     MUSCULOSKELETAL: Degenerative changes throughout the spine. No destructive osseous lesions. Unchanged subcutaneous lesion in the right axilla (3/34), likely a sebaceous or epidermal inclusion cyst.  Impression: IMPRESSION:  1.  Grossly unchanged size/appearance of the cecal mass with decreased adjacent right lower quadrant mesenteric lymph nodes.  2.  Slightly increased size of a few mesorectal lymph nodes versus dependent peritoneal deposits in the pelvis, suspicious for metastatic disease. Continued attention on follow-up is recommended.  3.  No definite new sites of disease in the chest, abdomen or pelvis.  4.  Unchanged mild pelvocaliectasis of the right lower quadrant transplant kidney.  5.  Additional details in the findings.    MR Peritoneum wo & w Contrast  Narrative: Exam: MR PERITONEUM WO & W CONTRAST, 4/24/2024 10:55 AM     Indication: Hx of cecal adenocarcinoma on current chemo  treatment,  needs reevaluation of treatment; Malignant neoplasm of cecum (H)     Comparison: 12/21/2023 CT, 1/19/2024 MRI     Technique: Images were acquired with and without intravenous contrast  through the and pelvis. The following MR images were acquired:  TrueFISP, multiplanar T2 weighted, axial T1 in/out of phase, axial  fat-saturated T1, diffusion-weighted. Multiplanar T1-weighted images  with fat saturation were obtained before contrast administration and  at multiple time points following the administration of intravenous  contrast. Contrast dose: 10mL Gadavist     FINDINGS:     Liver: No hepatic side or iron deposition. No appreciable focal  hepatic lesion, though evaluation is limited by respiratory motion  artifact..      Gallbladder/biliary tree: Cholelithiasis. No gallbladder wall  thickening or pericholecystic fluid. No intra or extrahepatic biliary  dilation.     Spleen: Not enlarged.     Kidneys: Atrophic native kidneys. T1 hyperintense, nonenhancing  hemorrhagic/proteinaceous cyst in the posterior mid pole of the left  kidney. T2 hyperintense, nonenhancing right renal cortical cysts.  Right lower quadrant renal transplant with continued moderate  hydronephrosis upstream of the ureterovesical junction. No suspicious  renal transplant lesion.     Adrenal glands: Normal.     Pancreas: Moderately atrophic pancreas. No main pancreatic ductal  dilation or suspicious mass. Subcentimeter T2 hyperintense cystic foci  in the pancreatic head and body/tail.     Bowel: No dilated small or large bowel. Hypoenhancing cecal mass  measuring approximately 3.6 x 5.1 x 5.2 cm just below the ileocecal  valve, previously 4.2 x 6.5 x 5.9 cm, when measured similarly.     Pelvis: Normal bladder. Mildly enlarged prostate.     Lymph nodes: Decreased size of several enlarged right lower quadrant  mesenteric lymph nodes, the largest measuring 1.3 cm short axis  (series 29 image 85), previously 2.1 cm.     Blood vessels: The  major abdominal and pelvic vasculature is patent.     Lung bases: Stable small left pleural effusion with adjacent  compressive atelectasis. Borderline cardiomegaly.     Bones and soft tissues: No aggressive osseous lesion.     Mesentery and abdominal wall: Continued subcutaneous edema in the left  greater than right abdominal wall. Right lower quadrant abdominal wall  incisional changes with a large incisional hernia. Trace ascites,  greatest in the pericecal region, though decreased since prior.  Slightly increased conspicuity of enhancing soft tissue abnormality  along the posterolateral aspect of the cecum (series 32 images 19-30).  Increased conspicuity of enhancing soft tissue abnormality in the  pelvis between the rectum and bladder (series 32 images 40-27) with  the largest nodule measuring 2.0 x 1.4 x 1.2 cm, previously 1.7 x 1.3  x 1.0 cm.  Impression: IMPRESSION:   1. Decreased cecal mass and associated right lower quadrant mesenteric  lymphadenopathy.  2. 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 Slightly decreased trace pericecal ascites.  3. No appreciable suspicious hepatic lesion, though evaluation is  limited by marked respiratory motion artifact.  4. Right lower quadrant renal transplant with stable moderate  hydronephrosis upstream of the ureteropelvic junction.  5. Cholelithiasis.  6. 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 Recommend attention on  follow-up.     MICHAEL NICOLE,          SYSTEM ID:  [id number]  I reviewed the above imaging report today.           Assessment and Plan:   Adenocarcinoma of the cecum, moderately differentiated, MSI-high (loss of MLH1 and PMS2, MLH1 promoter hypermethylated), BRAF V600 mutation positive, stage 3 with enlarged pericecal mesenteric lymphadenopathy.   - Now on treatment with  infusional 5FU with leucovorin, first cycle on 3/21/24  - Continues to tolerate treatment well with minimal side effects.   - Encouraged patient to find small ways to incorporate more movement into his daily routine and drink more water. Discussed s/s of DVT / PE as patient is high risk given cancer + treatment and sedentary lifestyle.   - Exam and labs reviewed, okay to proceed with C6 today w/o changes. Patient consents.  - Repeat CT CAP completed after cycle 4 with response shown in primary site and LN and CEA is decreasing but there is possible new peritoneal disease b/w the rectum and bladder - Pt follows-up with Dr. Blunt on 5/16.     DM II   - Hyperglycemia s/t steroids, recent discontinuation of IV and PO dexamethasone  - BS now trending 190's versus 400's previously.   - Discussed s/s of hypoglycemia, pt is aware of these symptoms and states he has not had a low episode in a long time  - Endocrinology consult pending     BLE edema  - Overall stable  - [facility] 2023  - Elevating extremities when able, wearing compression socks     Hx of renal transplant  - Previously on tacrolimus and cellcept - both on hold  - Confirmed taking prednisone 40 mg daily  - [facility]     RLE DVT and saddle PE  - s/p mechanical thrombectomy on 5/8/24  - Discharged on Eliquis, no s/s of bleeding    Anemia  - Hgb 7.6 today - will transfuse 1 unit RBC     Thrombocytopenia   - plts 119 today, no s/s of bleeding  - Recently placed on eliquis d/t DVT/PE  - Meeting treatment parameters      Mercedes Loo - ONI, CNP    Oncology Follow Up Visit: June 17, 2024     Oncologist: Dr. 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     Oncology HPI:  Berny Estrada is a 73 year old male with medical history including right renal transplant (2014), hypertension, type 2 diabetes with proliferative diabetic retinopathy and long-term insulin  use, CKD stage III, CAD, COPD, GERD, colon polyps (tubular adenoma), esophageal candidiasis, cholelithiasis, sleep apnea, hx of tobacco use (quit 50 years ago)  - Presenting complaint of weight loss 10/23-11/23 10-15 lbs, then gain 10 over past few months and significant ECOG decline over the past 6 months, using wheelchair and supplemental oxygen  - 12/1/23- 12/9/23 hospitalized 2/2 anemia, hgb 7.9 w/melena x few months (was on iron)  - 12/6/23 CEA 11.69, 12/7/23 CEA 10  - 12/14/2023 EGD: Patchy white plaques in proximal esophagus and midesophagus suspicious for candidiasis, otherwise normal  - 12/14/2023 [facility]: Fungating partially obstructing, partially circumferential (involving two thirds of the lumen circumference) large mass found in the cecum, including IC valve, with oozing  - Adenocarcinoma, moderately differentiated         - Loss of expression of MLH1 and PMS2; intact expression of MSH2 and MSH6  - MLH1 promoter methylation: POSITIVE   - BRAF V600 mutation positive  - 4 sessile polyps in ascending colon, 3-5 mm in size - Sessile serrated adenoma with focal high-grade dysplasia and Separate minute fragment of adenocarcinoma  - 2 mm sessile polyp in transverse colon - Tubular adenoma; negative for high-grade dysplasia  - 2 sessile polyps in the ascending colon, 4-5 mm in size - Tubular adenoma; negative for high-grade dysplasia  - 6 sessile polyps in the rectosigmoid colon, 3-8 mm in size- Sessile serrated adenoma with focal cytologic dysplasia. No evidence of high-grade dysplasia or invasive carcinoma     Treatment:  3/21/24 started infusional 5FU and Leucovorin     Interval History:  Pt is seen in clinic today prior to C6 of 5FU and Leucovorin. He states he has been very fatigued and weak with little energy. [facility] and is now on oxygen. They are monitoring his O2 levels at home. He reports his stools have been  "black for several weeks, most recent BM last evening. No cramping, abdominal pain, excess flatulence, nausea or vomiting. He is on a blood thinner now for a lung and leg clot. He is otherwise okay, not lightheaded or dizzy. No additional questions or concerns today.     Patient denies any of the following except if noted above: fevers, chills, difficulty with energy, vision or hearing changes, chest pain, dyspnea, abdominal pain, nausea, vomiting, diarrhea, constipation, urinary concerns, headaches, new aches/pains, numbness, tingling, issues with sleep or mood. He also denies lumps, bumps, rashes or skin lesions, bleeding or bruising issues.     Physical Exam:  BP (!) 143/76 (BP Location: Right arm)   Pulse 74   Temp 97.9  F (36.6  C) (Pulmonary Artery)   Resp 18   Ht 1.778 m (5' 10\")   Wt 105.7 kg (233 lb)   SpO2 97%   BMI 33.43 kg/m            BP Readings from Last 6 Encounters:   05/02/24 (!) 143/76   04/04/24 (!) 153/83   03/21/24 (!) 163/81   02/28/24 (!) 173/89   02/09/24 (!) 160/77   01/18/24 (!) 153/81          Wt Readings from Last 6 Encounters:   05/02/24 105.7 kg (233 lb)   04/18/24 106.3 kg (234 lb 4.8 oz)   04/04/24 106.1 kg (234 lb)   04/04/24 104.8 kg (231 lb)   03/21/24 104.8 kg (231 lb)   02/28/24 98.9 kg (218 lb)      Constitutional: No acute distress, pleasant, appropriately groomed.   ENT: PERRLA, sclera without erythema. Not wearing dentures, a little challenging to decipher speech at times  Neck: Trachea midline, no adenopathy.   Resp: No respiratory distress, adequate depth and rate of respirations, using 2L of O2.   Cardiac: S1/S2, RRR, no murmurs. +2 edema to BLE, wearing compression socks.   Abdomen: Obese, BS active, abdomen soft and non-tender. No masses or organomegaly.   MS:  In a wheelchair.  Skin: No rashes, lesions, or wounds on exposed skin.  Neuro: A/O x 4, sensation intact.   Psych: Appropriate mentation and affect.     Laboratory Results:   Results for orders placed or " performed in visit on 06/17/24   Comprehensive metabolic panel     Status: Abnormal   Result Value Ref Range    Sodium 139 135 - 145 mmol/L    Potassium 4.8 3.4 - 5.3 mmol/L    Carbon Dioxide (CO2) 27 22 - 29 mmol/L    Anion Gap 7 7 - 15 mmol/L    Urea Nitrogen 52.5 (H) 8.0 - 23.0 mg/dL    Creatinine 2.07 (H) 0.67 - 1.17 mg/dL    GFR Estimate 33 (L) >60 mL/min/1.73m2    Calcium 9.8 8.8 - 10.2 mg/dL    Chloride 105 98 - 107 mmol/L    Glucose 177 (H) 70 - 99 mg/dL    Alkaline Phosphatase 52 40 - 150 U/L    AST 13 0 - 45 U/L    ALT 15 0 - 70 U/L    Protein Total 6.3 (L) 6.4 - 8.3 g/dL    Albumin 3.3 (L) 3.5 - 5.2 g/dL    Bilirubin Total 0.3 <=1.2 mg/dL   CBC with platelets and differential     Status: Abnormal   Result Value Ref Range    WBC Count 8.7 4.0 - 11.0 10e3/uL    RBC Count 2.29 (L) 4.40 - 5.90 10e6/uL    Hemoglobin 7.6 (LL) 13.3 - 17.7 g/dL    Hematocrit 24.7 (L) 40.0 - 53.0 %     (H) 78 - 100 fL    MCH 33.2 (H) 26.5 - 33.0 pg    MCHC 30.8 (L) 31.5 - 36.5 g/dL    RDW 17.3 (H) 10.0 - 15.0 %    Platelet Count 119 (L) 150 - 450 10e3/uL    % Neutrophils 76 %    % Lymphocytes 15 %    % Monocytes 6 %    % Eosinophils 0 %    % Basophils 0 %    % Immature Granulocytes 2 %    NRBCs per 100 WBC 1 (H) <1 /100    Absolute Neutrophils 6.6 1.6 - 8.3 10e3/uL    Absolute Lymphocytes 1.3 0.8 - 5.3 10e3/uL    Absolute Monocytes 0.5 0.0 - 1.3 10e3/uL    Absolute Eosinophils 0.0 0.0 - 0.7 10e3/uL    Absolute Basophils 0.0 0.0 - 0.2 10e3/uL    Absolute Immature Granulocytes 0.2 <=0.4 10e3/uL    Absolute NRBCs 0.1 10e3/uL   Hematocrit     Status: Abnormal   Result Value Ref Range    Hematocrit 24.7 (L) 40.0 - 53.0 %   CBC with platelets differential     Status: Abnormal    Narrative    The following orders were created for panel order CBC with platelets differential.  Procedure                               Abnormality         Status                     ---------                               -----------         ------                      CBC with platelets and d...[[id number]]  Abnormal            Final result                 Please view results for these tests on the individual orders.   Folate RBC     Status: Abnormal (In process)    Narrative    The following orders were created for panel order Folate RBC.  Procedure                               Abnormality         Status                     ---------                               -----------         ------                     RBC Folate[[id number]]                                       In process                 Hematocrit[[id number]]                   Abnormal            Final result                 Please view results for these tests on the individual orders.     I reviewed the above labs today.     Imaging:  CT Chest Abdomen Pelvis w/o Contrast  Narrative: EXAM: CT CHEST ABDOMEN PELVIS W/O CONTRAST  LOCATION: [facility]  DATE: 4/24/2024     INDICATION: Hx of cecal adenocarcinoma on current chemo treatment, needs reevaluation of treatment  COMPARISON: CT abdomen pelvis 12/21/2023  TECHNIQUE: CT scan of the chest, abdomen, and pelvis was performed without IV contrast. Multiplanar reformats were obtained. Dose reduction techniques were used.   CONTRAST: None.     FINDINGS:   LUNGS AND PLEURA: Central airways are patent. Left lower lobe atelectasis/scarring, unchanged. Scattered calcified granulomas and tiny pulmonary nodules are unchanged. For instance 3 mm right middle lobe nodule adjacent to the horizontal fissure series 4   image #129. No new or enlarging lung nodules. Unchanged trace left pleural effusion.     MEDIASTINUM/AXILLAE: Right chest port catheter tip terminates in the right atrium. Stable heart size. No pericardial effusion. Thoracic aorta is nonaneurysmal with severe atheromatous disease. Unchanged calcified hilar lymph nodes from prior   granulomatous disease. No new or enlarging thoracic lymph nodes. Unchanged heterogeneity to the left  thyroid without worrisome nodule.     CORONARY ARTERY CALCIFICATION: Severe.     HEPATOBILIARY: Normal unenhanced liver contours. Cholelithiasis without pericholecystic inflammation.     PANCREAS: Unremarkable.     SPLEEN: Unremarkable.     ADRENAL GLANDS: Unremarkable.     KIDNEYS/BLADDER: Atrophy of the native bilateral kidneys with vascular calcifications. Benign proteinaceous cyst in the left kidney (which requires no follow-up). No urolithiasis or hydronephrosis.     Unchanged mild pelvocaliectasis of the right lower quadrant transplant kidney. Urinary bladder is unremarkable.     BOWEL: Unchanged appearance of the cecal mass (3/448). Remainder of small and large bowel is normal in caliber. No ascites.     LYMPH NODES: Decrease in size of right lower quadrant mesenteric lymph nodes. For instance 10 mm lymph node (3/392) previously measured 18 mm when measured similarly. Slightly increased size of a few mesorectal lymph nodes versus dependent peritoneal   deposits. For instance 12 mm nodules (3/500 and 3/484) both previously measured 9 mm.     VASCULATURE: Abdominal aorta is nonaneurysmal with severe atheromatous disease.     PELVIC ORGANS: Normal contours.     MUSCULOSKELETAL: Degenerative changes throughout the spine. No destructive osseous lesions. Unchanged subcutaneous lesion in the right axilla (3/34), likely a sebaceous or epidermal inclusion cyst.  Impression: IMPRESSION:  1.  Grossly unchanged size/appearance of the cecal mass with decreased adjacent right lower quadrant mesenteric lymph nodes.  2.  Slightly increased size of a few mesorectal lymph nodes versus dependent peritoneal deposits in the pelvis, suspicious for metastatic disease. Continued attention on follow-up is recommended.  3.  No definite new sites of disease in the chest, abdomen or pelvis.  4.  Unchanged mild pelvocaliectasis of the right lower quadrant transplant kidney.  5.  Additional details in the findings.    MR Peritoneum wo & w  Contrast  Narrative: Exam: MR PERITONEUM WO & W CONTRAST, 4/24/2024 10:55 AM     Indication: Hx of cecal adenocarcinoma on current chemo treatment,  needs reevaluation of treatment; Malignant neoplasm of cecum (H)     Comparison: 12/21/2023 CT, 1/19/2024 MRI     Technique: Images were acquired with and without intravenous contrast  through the and pelvis. The following MR images were acquired:  TrueFISP, multiplanar T2 weighted, axial T1 in/out of phase, axial  fat-saturated T1, diffusion-weighted. Multiplanar T1-weighted images  with fat saturation were obtained before contrast administration and  at multiple time points following the administration of intravenous  contrast. Contrast dose: 10mL Gadavist     FINDINGS:     Liver: No hepatic side or iron deposition. No appreciable focal  hepatic lesion, though evaluation is limited by respiratory motion  artifact..      Gallbladder/biliary tree: Cholelithiasis. No gallbladder wall  thickening or pericholecystic fluid. No intra or extrahepatic biliary  dilation.     Spleen: Not enlarged.     Kidneys: Atrophic native kidneys. T1 hyperintense, nonenhancing  hemorrhagic/proteinaceous cyst in the posterior mid pole of the left  kidney. T2 hyperintense, nonenhancing right renal cortical cysts.  Right lower quadrant renal transplant with continued moderate  hydronephrosis upstream of the ureterovesical junction. No suspicious  renal transplant lesion.     Adrenal glands: Normal.     Pancreas: Moderately atrophic pancreas. No main pancreatic ductal  dilation or suspicious mass. Subcentimeter T2 hyperintense cystic foci  in the pancreatic head and body/tail.     Bowel: No dilated small or large bowel. Hypoenhancing cecal mass  measuring approximately 3.6 x 5.1 x 5.2 cm just below the ileocecal  valve, previously 4.2 x 6.5 x 5.9 cm, when measured similarly.     Pelvis: Normal bladder. Mildly enlarged prostate.     Lymph nodes: Decreased size of several enlarged right lower  quadrant  mesenteric lymph nodes, the largest measuring 1.3 cm short axis  (series 29 image 85), previously 2.1 cm.     Blood vessels: The major abdominal and pelvic vasculature is patent.     Lung bases: Stable small left pleural effusion with adjacent  compressive atelectasis. Borderline cardiomegaly.     Bones and soft tissues: No aggressive osseous lesion.     Mesentery and abdominal wall: Continued subcutaneous edema in the left  greater than right abdominal wall. Right lower quadrant abdominal wall  incisional changes with a large incisional hernia. Trace ascites,  greatest in the pericecal region, though decreased since prior.  Slightly increased conspicuity of enhancing soft tissue abnormality  along the posterolateral aspect of the cecum (series 32 images 19-30).  Increased conspicuity of enhancing soft tissue abnormality in the  pelvis between the rectum and bladder (series 32 images 40-27) with  the largest nodule measuring 2.0 x 1.4 x 1.2 cm, previously 1.7 x 1.3  x 1.0 cm.  Impression: IMPRESSION:   1. Decreased cecal mass and associated right lower quadrant mesenteric  lymphadenopathy.  2. 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 Slightly decreased trace pericecal ascites.  3. No appreciable suspicious hepatic lesion, though evaluation is  limited by marked respiratory motion artifact.  4. Right lower quadrant renal transplant with stable moderate  hydronephrosis upstream of the ureteropelvic junction.  5. Cholelithiasis.  6. 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 Recommend attention on  follow-up.     MICHAEL NICOLE,          SYSTEM ID:  [id number]  I reviewed the above imaging report today.        Assessment and Plan:   Adenocarcinoma of the cecum, moderately differentiated, MSI-high (loss of MLH1 and PMS2, MLH1 promoter  hypermethylated), BRAF V600 mutation positive, stage 3 with enlarged pericecal mesenteric lymphadenopathy.   - Now on treatment with infusional 5FU with leucovorin, first cycle on 3/21/24  - Repeat CT CAP completed after cycle 4 with response shown in primary site and LN and CEA is decreasing but there is possible new peritoneal disease b/w the rectum and bladder   - [facility] ([facility] and then again admitted for hypoxia and discharged with oxygen) - [facility].   - Patient today reporting black stools x2 weeks with fatigue and weakness. Hgb decreased to 7.6. Recently placed on Eliquis for PE/DVT. Concern is for upper GI bleed - Advised patient to go to ER for further evaluation. Handoff given to ER provider. Will defer treatment today.     DM II   - Hyperglycemia s/t steroids, recent discontinuation of IV and PO dexamethasone  - BS now trending 190's versus 400's previously.   - Discussed s/s of hypoglycemia, pt is aware of these symptoms and states he has not had a low episode in a long time  - Endocrinology consult pending     BLE edema  - Overall stable  - [facility] negative for DVT Dec 2023  - Elevating extremities when able, wearing compression socks     Hx of renal transplant  - Previously on tacrolimus and cellcept - both on hold  - Confirmed taking prednisone 40 mg daily  - [facility]son [facility]     RLE DVT and saddle PE  - s/p mechanical thrombectomy on 5/8/24  - Discharged on Eliquis, no s/s of bleeding    Anemia  - Hgb 7.6 today - will transfuse 1 unit RBC     Thrombocytopenia   - plts 119 today, no s/s of bleeding  - Recently placed on eliquis d/t DVT/PE  - Meeting treatment parameters      Mercedes Loo - APRN, CNP      Again, thank you for allowing me to participate in the care of your patient.        Sincerely,        Mercedes Loo, ONI CNP

## 2024-06-17 NOTE — PROGRESS NOTES
Patient was not seen in infusion.   Patient sent to ER by provider who saw patient in clinic    Brooke Loo RN

## 2024-06-18 LAB — FOLATE RBC-MCNC: NORMAL NG/ML

## 2024-06-21 ENCOUNTER — VIRTUAL VISIT (OUTPATIENT)
Dept: ONCOLOGY | Facility: CLINIC | Age: 74
End: 2024-06-21
Attending: INTERNAL MEDICINE
Payer: MEDICARE

## 2024-06-21 VITALS
HEIGHT: 70 IN | DIASTOLIC BLOOD PRESSURE: 78 MMHG | BODY MASS INDEX: 32.21 KG/M2 | WEIGHT: 225 LBS | SYSTOLIC BLOOD PRESSURE: 142 MMHG

## 2024-06-21 DIAGNOSIS — D69.6 THROMBOCYTOPENIA (H): ICD-10-CM

## 2024-06-21 DIAGNOSIS — D64.89 ANEMIA DUE TO OTHER CAUSE, NOT CLASSIFIED: ICD-10-CM

## 2024-06-21 DIAGNOSIS — D50.0 IRON DEFICIENCY ANEMIA DUE TO CHRONIC BLOOD LOSS: Primary | ICD-10-CM

## 2024-06-21 DIAGNOSIS — C18.0 MALIGNANT NEOPLASM OF CECUM (H): ICD-10-CM

## 2024-06-21 PROCEDURE — 99214 OFFICE O/P EST MOD 30 MIN: CPT | Mod: 95 | Performed by: INTERNAL MEDICINE

## 2024-06-21 ASSESSMENT — PAIN SCALES - GENERAL: PAINLEVEL: NO PAIN (0)

## 2024-06-21 NOTE — NURSING NOTE
Is the patient currently in the state of [place]? YES    Visit mode:VIDEO    If the visit is dropped, the patient can be reconnected by: VIDEO VISIT: Send to e-mail at: [email or web address]    Will anyone else be joining the visit? NO  (If patient encounters technical issues they should call [phone]    How would you like to obtain your AVS? MyChart    Are changes needed to the allergy or medication list? No    Reason for visit: Video Visit (Follow Up )    Kristy PALMA

## 2024-06-21 NOTE — PROGRESS NOTES
Video-Visit Details     Video Start Time: 2:23PM     Type of service:  Video Visit     Video End Time: 2:53PM    Originating Location (pt. Location): Home     Distant Location (provider location):  [facility] off site     Platform used for Video Visit: Well     [facility]    Hematology/Oncology Established Patient Follow-Up Note      Today's Date: 6/21/2024    Reason for follow-up: cecal adenocarcinoma     HISTORY OF PRESENT ILLNESS: Berny Estrada is a 74 year old male who presents for follow-up    Patient has medical history including right renal transplant (2014), hypertension, type 2 diabetes with proliferative diabetic retinopathy and long-term insulin use, CKD stage III, CAD, COPD, GERD, colon polyps (tubular adenoma), esophageal candidiasis, cholelithiasis, sleep apnea, hx of tobacco use (quit 50 years ago)    -12/1/23- 12/9/23 hospitalized 2/2 anemia, hgb 7.9 w/melena x few months (was on iron)  - 12/6/23 CEA 11.69, 12/7/23 CEA 10  - 12/14/2023 EGD: Patchy white plaques in proximal esophagus and midesophagus suspicious for candidiasis, otherwise normal  -12/14/2023 [facility]:  - Fungating partially obstructing, partially circumferential (involving two thirds of the lumen circumference) large mass found in the cecum, including IC valve, with oozing  - 4 sessile polyps in ascending colon, 3-5 mm in size  - 2 mm sessile polyp in transverse colon  - 2 sessile polyps in the ascending colon, 4-5 mm in size  - 6 sessile polyps in the rectosigmoid colon, 3-8 mm in size  - Medium sized external hemorrhoids  PATHOLOGY:  1.  MID ESOPHAGUS, BIOPSY:  - Squamous mucosa with superficial colonization by fungal organisms positive for PAS stain, morphologically compatible with Candida species    2.  COLON, CECUM MASS, BIOPSY:  - Adenocarcinoma, moderately differentiated  - Loss of expression of MLH1 and PMS2; intact expression of MSH2 and  MSH6   - MLH1 promoter methylation: POSITIVE   - BRAF V600 mutation positive     3.  ASCENDING COLON, POLYPECTOMY:  - Sessile serrated adenoma with focal high-grade dysplasia  - Separate minute fragment of adenocarcinoma     4.  TRANSVERSE COLON, POLYPECTOMY:  - Tubular adenoma; negative for high-grade dysplasia     5.  DESCENDING COLON, POLYPECTOMY:  - Tubular adenoma; negative for high-grade dysplasia     6.  RECTOSIGMOID COLON, POLYPECTOMY:  - Sessile serrated adenoma with focal cytologic dysplasia  - No evidence of high-grade dysplasia or invasive carcinoma    -12/21/23 CT CAP without contrast:  -Cardiomegaly, coronary artery stenting calcifications  - Dilated main pulmonary artery, 3.8 cm  - Calcified hilar lymphadenopathy  - RML and lingular bronchiectatic changes with associated fibroatelectasis, may be secondary to chronic infectious/inflammatory process, Ill-defined groundglass opacities predominantly affecting DERIC and lingula, Dependent atelectasis  - Small left-sided pleural effusion  -Atrophic thyroid with ill-defined subcentimeter nodularity  - Cholelithiasis  - Atrophic appearance of native kidneys, right lower quadrant renal transplant with mild hydronephrosis  - 1.6 cm hyperattenuating lesion of left kidney  - Ill-defined soft tissue thickening about cecum, likely corresponding to known cecal mass  - Associated thickening of peritoneal planes in the region  - Enlarged 2.1 x 1.7 cm mesenteric node adjacent to cecum  - Borderline enlarged 0.9 cm perirectal node  IMPRESSION:   1. Cecal mass with enlarged nodes of the right lower quadrant  including a 2.1 x 1.7 cm lymph node, which are suspicious for  metastatic disease. Indeterminate thickening and nodularity of the  peritoneal surfaces near the cecal mass could be due to fluid or  mesenteric spread of disease.  3. Indeterminate 1.6 cm lesion of the left native kidney. Consider  dedicated renal MRI with contrast for further characterization.  4. Right  lower quadrant renal transplant with mild hydronephrosis.  5. Ill-defined groundglass opacities of the left upper lobe and  lingula suspicious for an infectious/inflammatory etiology. Additional  bronchiectatic and fibroatelectatic changes of the right middle lobe  and lingula suggesting a chronic inflammatory process.  6. Small left-sided pleural effusion.  7. Dilated main pulmonary artery as can be seen with pulmonary  arterial hypertension.  8. Cholelithiasis without findings to suggest acute cholecystitis.    -12/21/23 CEA 31.3      Pt denies change in stool caliber, diarrhea, abdominal pain, abdominal distention, nausea, vomiting, no weight loss. Stool is dark (on iron), reported to be black, no BRBPR, no clots. Pt reports weight loss 10/23-11/23 10-15 lbs, then gain 10 over past few months    Pt reports right leg swelling 12/23 onwards with some improvement and palpated knot in left leg. B/l LE doppler 12/8/23 negative for DVT    Regarding ECOG:  Significant decline over the past 6 months, currently using wheelchair and supplemental oxygen  Working with PT 2x/week  Uses cane and walker prn recently, a few months ago was more consistent  Wife helps with most ADLs (cooking, cleaning, groceries, driving). Is able to go to bathroom, shower and dress himself.  Spends about 50% of day in recliner per pt, less per wife  PT 2x/week     Family history:  Brother- colorectal cancer, age 61, s/p surgery, receiving chemotherapy    Regarding kidney transplant  - right kidney transplant 2014  - on tacrolimus and prednisone 10 mg daily (off cellcept since 12/23)  - 12/23 Cr 1.6, GFR 45    - 1/19/24 MRI peritoneum w/contrast:  -  No appreciable focal hepatic lesion, though evaluation is limited by marked respiratory motion artifact..   - Subcentimeter T2 hyperintense cystic foci  in the pancreatic head and body/tail. 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 Recommend attention on  follow-up.  -  Hypoenhancing cecal mass measuring approximately 4.0 x 5.8 x 5.7 cm just below the ileocecal valve.  - Enlarged right lower quadrant/pericecal mesenteric lymph  nodes measuring up to 2.0 cm short axis  - Small left pleural effusion with adjacent compressive  atelectasis. Cardiomegaly  -  Trace ascites, greatest in the pericecal region without  appreciable suspicious peritoneal nodularity    - 2/28/24 port placement    - 3/6/24 started infusional 5FU and LV    - 4/24/24 CT CAP w/o contrast 2/2 kidney function  1.  Grossly unchanged size/appearance of the cecal mass with decreased adjacent right lower quadrant mesenteric lymph nodes (10 mm lymph node previously measured 18 mm when measured similarly).  2.  Slightly increased size of a few mesorectal lymph nodes versus dependent peritoneal deposits in the pelvis, suspicious for metastatic disease. Continued attention on follow-up is recommended. (Slightly increased size of a few mesorectal lymph nodes versus dependent peritoneal   deposits. For instance 12 mm nodules both previously measured 9 mm)  3.  No definite new sites of disease in the chest, abdomen or pelvis.  4.  Unchanged mild pelvocaliectasis of the right lower quadrant transplant kidney.    -4/24/24 MR peritoneum w/wo contrast:  - Hypoenhancing cecal mass measuring approximately 3.6 x 5.1 x 5.2 cm just below the ileocecal valve, previously 4.2 x 6.5 x 5.9 cm, when measured similarly  -  Decreased size of several enlarged right lower quadrant mesenteric lymph nodes, the largest measuring 1.3 cm short axis, previously 2.1 cm.  - Trace ascites,greatest in the pericecal region, though decreased since prior.  Slightly increased conspicuity of enhancing soft tissue abnormality along the posterolateral aspect of the cecum  - Increased conspicuity of enhancing soft tissue abnormality in the pelvis between the rectum and bladder with the largest nodule measuring 2.0 x 1.4 x 1.2 cm, previously 1.7 x 1.3 x 1.0 cm.  -  No appreciable suspicious hepatic lesion, though evaluation is limited by marked respiratory motion artifact.  - Right lower quadrant renal transplant with stable moderate hydronephrosis upstream of the ureteropelvic junction.  - Cholelithiasis.  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 Recommend attention on follow-up.    - 5/8/24-5/13/24 [facility]2 min (wife found him unresponsive on bed), found to have RLE DVT, submassive saddle PE w/acute hypoxic RF, echo: at least mild RV strain, s/p mechanical thrombectomy 5/8/24 (no systemic thrombolytics 2/2 risk of bleed), started on heparin and discharged on eliquis, hgb 7.3 during admission- given 1uprb  - 5/7/24 CT PE: Large saddle pulmonary embolism, which continues extensively into   branches of the right pulmonary artery, including proximal emboli   into the right upper, right middle, and most extensively, right   lower lobe, with likely complete occlusion of pulmonary arteries   extending into the lateral basilar segment.     4.7 x 3.7 x 2.2 cm irregular peripheral pleural-based dense   consolidation lateral right upper lobe, with penumbra of   groundglass, extending to the hilum, favored to represent   evolving pulmonary infarct. Mass or infiltrate could have a   similar appearance.     Additional areas of scattered groundglass, and small irregular   opacities, example (series 5):     - 2.2 x 1.5 cm patchy consolidation central right middle   lobe, image 141     - 2 x 1.8 cm posterior pleural-based left lung apex, image 40     may also represent additional infarct, pulmonary metastasis, or   focal pneumonitis.     Scattered additional small irregular opacities are seen   bilaterally, most extensively in the central right lung.     A prior small to moderate right pleural effusion is resolved.   Irregular patchy left lower lobe pleural-based consolidation   appears similar to prior,  with a small pleural effusion, which is   mildly decreased.   - Main   pulmonary artery 4 cm, markedly enlarged. Dilated right ventricle   with ventricular strain pattern. Likely concentric LVH. No   pericardial effusion. Small mediastinal lymph nodes, and   borderline subcarinal node measuring up to 12 mm, unchanged.      1. Large saddle pulmonary embolism, with most extensive emboli   burden extending into branches of the right pulmonary artery,   with cyst occlusion of right lower lobe anterior basilar segment   pulmonary arteries. Findings were discussed with Dr. Tiffanie Arndt of the emergency service at 11:45 AM central time on   5/8/2024, who verbalized understanding.     2. Multiple patchy consolidations and scattered small irregular   opacities with regional groundglass, the largest in the lateral   subpleural right upper lobe, suspect evolving pulmonary infarcts.   Pulmonary metastases, or focal infiltrates could have a similar   appearance.     3. Resolved prior right pleural effusion, with persistent   pleural-based consolidation in the medial left lower lobe, with   small effusion (slightly decreased).     5. Markedly enlarged pulmonary artery, consistent with elevated   pulmonary arterial pressures.     6. Multichamber cardiac enlargement, biventricular, with   concentric LVH.   - 5/8/24 b/l LE doppler: Acute partially occlusive thrombus within one of   the posterior tibial veins at the right mid calf. This is part of   the deep venous system   - 5/8/24 mechanical thrombectomy: evacuated a large amount   of thrombus including the saddle thrombus and occlusive lobar and segmental thrombus. Follow-up angiogram demonstrates excellent flow through the main, right, lobar and segmental pulmonary arteries.     INTERIM HISTORY:      - 5/20/24 Case discussed at 5/20/24 [facility] tumor board  Reconsider immunotherapy over avastin  Re-discuss risk of renal transplant rejection vs progression of disease  No role for  surgery at this time given concern for peritoneal disease  - 5/22/24 received message from home infusion services regarding hypoxia on RA as low as 86%, REINA, LLL crackles and RUL wheezing, nursing team directed pt to go to ER  - 5/30/24 pt missed appt and C6 of tx 2/2 [facility] for [facility]argy, REINA, hypoxia on RA 75-80% per wife, was given IV iron and 1uprbc, discharged on O2 via NC 1-2 L  - 6/17/24 rescheduled C6 cancelled 2/2 pt reporting black stools x 2 weeks, fatigue, anemia hgb 7.6 while on eliquis (5/24 hgb 8.7, prior baseline 9-10) and thrombocytopenia plt 119K (at baseline), [facility] and [facility] him    REGIMEN:  Infusional 5FU and LV  q14 days, up to 24 weeks of perioperative chemo total   C1D1 3/6/24, C2D1 4/4/24, C3D1 4/18/24, C4D1 5/2/24 (complicated by 5/8/24-5/13/24 [facility]; RLE DVT, submassive saddle PE w/acute hypoxic RF, echo: at least mild RV strain, s/p mechanical thrombectomy 5/8/24 (no systemic thrombolytics 2/2 risk of bleed), started on heparin and discharged on eliquis, hgb 7.3 during admission- given 1uprbc), C5D1 5/16/24 (complicated by hospitalization for hypoxia)  LV 350mg/m2 day 1  5FU 1200mg/m2 continuous infusion day 1-2 (total 2,400mg/m2 IV over 46-48 hours)  Emetic risk: low  Febrile neutropenia risk: low    C6D1 5/30/24 planned and delayed 2/2 hospitalization for hypoxia 5/2024 and then delayed again 2/2 ER visit for melena w/anemia while on eliquis 6/17/24    Treatment related AE:  - weakness- C1D2- 3/8/24- 3/10/24 admission at [facility] for mechanical fall, leaning forward to pick something up off the ground in the bathroom when he fell forward impacting his right eye requiring sutures for laceration, no LOC, CT showed right orbital roof, wall, and floor fractures with. Right eye pressure 33, left eye 11. Seen by ophthalmology, ENT, neurosurgery- no acute intervention; C2D1- ongoing weakness, needed  assistance w/transfers; C3 and C4- pt reports weakness is better after a few days, states he ambulates independently but wife reports he is stumbling more often, now agreeable to using walker; appetite ok, drinking at least 32oz per day  - hyperglycemia w/episodes of hypoglycemia- labile BS, C2 4/4/24 BS 50s- 400s, dexamethasone discontinued, C3 BS 190s, C4 BS 300s, endocrine consult pending  - macrocytic anemia- BL 11, C2 onwards hgb 10, work up showed no nutritional def, elevated retic; 5/11/24 hgb 7.3 ([facility], [facility], submassive saddle PE)- given 1 uprbc; 5/16/24 hgb 8.7, ferritin 389, iron sat 15, TIBC 200, iron 30, B12 838, TSH 5.32, free T4 1.24  - thrombocytopenia- BL plt 172K, C2 onwards plt 100K, 5/16/24 plt 131K  - GUANACO- 5/13/24 Cr 1.9, 5/16/24 Cr 2.47, given IVF 5/16/24 and 5/17/24, 5/17/24 Cr 2.04    RESOLVED:  - hyperkalemia- 5/16/24 K 5.5, 5/16/24 K 5.5, EKG- sinus, UT normal, QRS normal, T waves tented but not peaked, unable to give insulin (not available), gave high dose albuterol 10mg for reversal, avoid diuretics w/GUANACO; 5/17/24 K 5.0          Half way through virtual visit, it had to be converted to telephone visit 2/2 poor connection    REVIEW OF SYSTEMS:   A 14 point ROS was reviewed with pertinent positives and negatives in the HPI.        HOME MEDICATIONS:  Current Outpatient Medications   Medication Sig Dispense Refill    albuterol (PROAIR HFA/PROVENTIL HFA/VENTOLIN HFA) 108 (90 Base) MCG/ACT inhaler INHALE 2 PUFFS BY INHALATION EVERY 6 HOURS AS NEEDED FOR SHORTNESS OF BREATH, FOR SECRETIONS      amLODIPine (NORVASC) 10 MG tablet Take 10 mg by mouth daily      apixaban ANTICOAGULANT (ELIQUIS) 5 MG tablet 10 mg      carvedilol (COREG) 12.5 MG tablet Take 12.5 mg by mouth 2 times daily      insulin aspart U-10, diluted conc 10 units/mL, (NOVOLOG) 10 unit/ml injection Inject Subcutaneous 3 times daily (before meals) 10-16 units      INSULIN GLARGINE SC Inject 34 Units Subcutaneous at  bedtime 7/11 am      ipratropium - albuterol 0.5 mg/2.5 mg/3 mL (DUONEB) 0.5-2.5 (3) MG/3ML neb solution INHALE 3ML IN NEBULIZER BY INHALATION FOUR TIMES A DAY AS NEEDED FOR SHORTNESS OF BREATH      lidocaine-prilocaine (EMLA) 2.5-2.5 % external cream Apply topically as needed for moderate pain 30 g 1    loratadine (CLARITIN) 10 MG tablet 10 mg      Magnesium Oxide 420 MG TABS Take 1 tablet by mouth 2 times daily      mycophenolate, IDS 3865, (CELLCEPT) 250 MG capsule Take 2 tablets by mouth 2 times daily      OMEPRAZOLE PO Take 1 tablet by mouth daily      ondansetron (ZOFRAN) 8 MG tablet Take 1 tablet (8 mg) by mouth every 8 hours as needed for nausea (vomiting) 30 tablet 2    ondansetron (ZOFRAN) 8 MG tablet Take 1 tablet (8 mg) by mouth every 8 hours as needed for nausea (vomiting) 30 tablet 2    predniSONE (DELTASONE) 10 MG tablet Take 40 mg by mouth daily      prochlorperazine (COMPAZINE) 10 MG tablet Take 1 tablet (10 mg) by mouth every 6 hours as needed for nausea or vomiting 30 tablet 2    prochlorperazine (COMPAZINE) 10 MG tablet Take 1 tablet (10 mg) by mouth every 6 hours as needed for nausea or vomiting 30 tablet 2    tacrolimus (PROGRAF-GENERIC EQUIVALENT) 5 MG capsule Take 5 mg by mouth daily      tamsulosin (FLOMAX) 0.4 MG capsule Take 0.4 mg by mouth daily      VITAMIN D, CHOLECALCIFEROL, PO Take 1 tablet by mouth once a week           ALLERGIES:  No Known Allergies      PAST MEDICAL HISTORY:  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    Sleep apnea     has equipment  not always uses    Type 2 diabetes mellitus without complications (H)          PAST SURGICAL HISTORY:  Past Surgical History:   Procedure Laterality Date    EYE SURGERY  7/12/2016    CE IOL PPV RE    INSERT PORT VASCULAR ACCESS Right 2/28/2024    Procedure: Insert port  vascular access;  Surgeon: Damián Estrada MD;  Location: [facility] OR    IR CHEST PORT PLACEMENT > 5 YRS OF AGE  2/28/2024    PANRETINAL PHOTOCOAGULATION (PRP) OD (RIGHT EYE)      last 9/18/13    PANRETINAL PHOTOCOAGULATION (PRP) OS (LEFT EYE)  4/15/14    [facility]    PHACOEMULSIFICATION CLEAR CORNEA WITH STANDARD IOL, VITRECTOMY PARSPLANA 25 GAGUE, COMBINED Right 7/12/2016    Procedure: COMBINED PHACOEMULSIFICATION CLEAR CORNEA WITH STANDARD INTRAOCULAR LENS IMPLANT, VITRECTOMY PARSPLANA 25 GAUGE;  Surgeon: Mireille Don MD;  Location: [facility]         SOCIAL HISTORY:  Social History     Socioeconomic History    Marital status:      Spouse name: Not on file    Number of children: Not on file    Years of education: Not on file    Highest education level: Not on file   Occupational History    Not on file   Tobacco Use    Smoking status: Former    Smokeless tobacco: Never   Substance and Sexual Activity    Alcohol use: Not on file    Drug use: Not on file    Sexual activity: Not on file   Other Topics Concern    Not on file   Social History Narrative    Not on file           Social Determinants of Health     Financial Resource Strain: Not on file   Food Insecurity: Not on file   Transportation Needs: Not on file   Physical Activity: Not on file   Stress: Not on file   Social Connections: Not on file   Interpersonal Safety: Not on file   Housing Stability: Not on file         FAMILY HISTORY:  Family History   Problem Relation Age of Onset    Glaucoma No family hx of     Macular Degeneration No family hx of          PHYSICAL EXAM:  Vital signs:  There were no vitals taken for this visit.       LABS:    PATHOLOGY:      IMAGING:      ASSESSMENT/PLAN:  Berny Estrada is a 74 year old male with:      # moderately differentiated cecum adenocarcinoma, MSI-high (loss of MLH1 and PMS2, MLH1 promoter hypermethylated), BRAF V600 mutation positive  -12/14/23 diagnostic colonoscopy: Fungating partially  obstructing, partially circumferential (involving two thirds of the lumen circumference) large mass found in the cecum, including IC valve, with oozing, PATHOLOGY: COLON, CECUM MASS, BIOPSY: Adenocarcinoma, moderately differentiated, Loss of expression of MLH1 and PMS2; intact expression of MSH2 and MSH6, MLH1 promoter methylation: POSITIVE ; BRAF V600 mutation positive (not Prasad syndrome)  - CRC NGS: Detected Alterations of Known or Potential Pathogenicity: BRAF V600E, TMB Score: 73.131 mut/Mb  -12/21/23 CT CAP without contrast: Cecal mass with enlarged nodes of the right lower quadrant including a 2.1 x 1.7 cm lymph node, which are suspicious for metastatic disease. Indeterminate thickening and nodularity of the peritoneal surfaces near the cecal mass could be due to fluid or mesenteric spread of disease. Indeterminate 1.6 cm lesion of the left native kidney. Small left-sided pleural effusion.  -12/7/23 CEA 10,  12/21/23 CEA 31.3, 1/24 CEA 48.5    -1/24 MR peritoneum: no peritoneal disease, Hypoenhancing cecal mass measuring approximately 4.0 x 5.8 x 5.7 cm just below the ileocecal Valve, Enlarged RLQ/pericecal mesenteric lymph nodes measuring up to 2.0 cm short axis     TREATMENT  - pt has at least stage 3 disease with enlarged pericecal mesenteric LN  - d/w Dr. Reid, will attempt to give chemotherapy up front for approximately 3 months and then restage to see if he is a surgical candidate  - ECOG 2 at baseline at best   - for right sided tumors w/BRAF mutation, FOLFIRINOX or FOLFIRI is used w/avastin, however given pts co-morbidities, I do not think the patient could tolerate this regimen. Will avoid immunotherapy given pt is on immunosuppressive medications for renal transplant   - 3/6/24 started infusional 5FU and LV  - 4/24/24 CT CAP and MR peritoneum (after 4 cycles) showed enlarging soft tissue abnormality in pelvis b/w rectum and bladder  - case discussed at [facility] tumor board recs: Reconsider  immunotherapy over avastin, Re-discuss risk of renal transplant rejection vs progression of disease, No role for surgery at this time given concern for peritoneal disease    REGIMEN:  Infusional 5FU and LV  q14 days, up to 24 weeks of perioperative chemo total   C1D1 3/6/24, C2D1 4/4/24, C3D1 4/18/24, C4D1 5/2/24 (complicated by 5/8/24-5/13/24 [facility]; RLE DVT, submassive saddle PE w/acute hypoxic RF, echo: at least mild RV strain, s/p mechanical thrombectomy 5/8/24 (no systemic thrombolytics 2/2 risk of bleed), started on heparin and discharged on eliquis, hgb 7.3 during admission- given 1uprbc), C5D1 5/16/24 (complicated by [facility] for [facility])  LV 350mg/m2 day 1  5FU 1200mg/m2 continuous infusion day 1-2 (total 2,400mg/m2 IV over 46-48 hours)  Emetic risk: low  Febrile neutropenia risk: low    C6D1 5/30/24 planned and delayed 2/2 hospitalization for hypoxia 5/2024 and then ER visit for melena w/anemia while on eliquis 6/17/24    Treatment related AE:  - weakness- C1D2- 3/8/24- 3/10/24 admission at [facility] for mechanical fall w/facial fractures and laceration requiring stitches, improved now  - hyperglycemia- BS still in 300s despite discontinuing dexamethasone (on prednisone 20mg PO daily for renal transplant), endocrine consult pending scheduling  - macrocytic anemia- 5/11/24 hgb 7.3 (at [facility], [facility] D[facility], submassive saddle PE)- given 1 uprbc; 5/16/24 hgb 8.7 w/iron sat 15, based on Ganzoni equation w/goal hgb 12 and 500mg needed for iron stores, pts iron deficit is 1300 mg, will rx ferric carboxymaltose 750mg x2 doses, 1 week apart. Risk of infusion rxn d/w pt and he would like to proceed.   - thrombocytopenia- 5/16/24 plt 131K, monitor while on eliquis  - GUANACO- 5/13/24 Cr 1.9, 5/16/24 Cr 2.47 improved to 2.04 w/IVF x2 days      IMAGING:  - 4/24/24 CT CAP w/o contrast 2/2 kidney function  1.  Grossly unchanged size/appearance of the cecal mass with decreased  adjacent right lower quadrant mesenteric lymph nodes (10 mm lymph node previously measured 18 mm when measured similarly).  2.  Slightly increased size of a few mesorectal lymph nodes versus dependent peritoneal deposits in the pelvis, suspicious for metastatic disease. (For instance 12 mm nodules both previously measured 9 mm)    -4/24/24 MR peritoneum w/wo contrast:  - Hypoenhancing cecal mass measuring approximately 3.6 x 5.1 x 5.2 cm just below the ileocecal valve, previously 4.2 x 6.5 x 5.9 cm, when measured similarly  -  Decreased size of several enlarged right lower quadrant  mesenteric lymph nodes, the largest measuring 1.3 cm short axis, previously 2.1 cm.  - Trace ascites,greatest in the pericecal region, though decreased since prior.  - Slightly increased conspicuity of enhancing soft tissue abnormality along the posterolateral aspect of the cecum  - Increased conspicuity of enhancing soft tissue abnormality in the pelvis between the rectum and bladder with the largest nodule measuring 2.0 x 1.4 x 1.2 cm, previously 1.7 x 1.3 x 1.0 cm.    TUMOR MARKERS:  12/21/23 CEA 31.3  1/18/24 CEA 48.5 (prior to starting tx)  4/18/24 CEA 24.5  6/17/24 CEA 14.7    PLAN:  - he had progression of disease on infusional 5FU and LV, based on 4/24 scans. Since then, he has had 2 hospitalizations for 1st for DVT, saddle PE requiring thrombectomy, 2nd for hypoxia and ER visit for anemia w/suspected GIB  - pts ECOG is 2 at best but more realistically ECOG 3 on most days   - we discussed tumor board recs: Reconsider immunotherapy over avastin, Re-discuss risk of renal transplant rejection vs progression of disease, No role for surgery at this time given concern for peritoneal disease  - We discussed the following data regarding use of immunotherapy in patients with solid organ transplants, notably re: renal transplant-pt is on chronic steroids, prednisone 20mg PO daily, off cellcept and tacrolimus:    Werner CASE et al; Immune  Checkpoint Inhibitors in Solid Organ Transplant Consortium. A multi-center study on safety and efficacy of immune checkpoint inhibitors in cancer patients with kidney transplant. Kidney Int. 2021 Jul;100(1):196-205. doi: 10.1016/[email or web address]. Epub 2020 Dec 24. PMID: 62870520; PMCID: [id number].    Jimbo S et al. Immune checkpoint inhibitors for solid organ transplant recipients: clinical updates. [place] J Transplant. 2022 Jun 30;36(2):82-98. doi: 10.4285/kjt.22.0013. Epub 2022 Gigi 3. PMID: 69262195; PMCID: [id number].    Fiordaliza Cardoso et al. Fine-tuning tumor- and allo-immunity: advances in the use of immune checkpoint inhibitors in kidney transplant recipients, Clinical Kidney Journal, Volume 17, Issue 4, April 2024, [email or web address], [email or web address]    Rossi Wright, Gabino Perez, John Whatley, Ash Larios, Immune checkpoint blockade for organ-transplant recipients with cancer: A review,   Journal of Cancer, Volume 175, 2022, Pages 326-335, ISSN [id number], [email or web address].        - pt would like to discuss further with his nephrologist prior to deciding whether he wants to proceed with immunotherapy   - I will also reach out to the pts nephrologist  - if planning on starting immunotherapy, would need repeat imaging prior to this       #submassive PE s/p thrombectomy  #RLE DVT  - 5/8/24-5/13/24 admitted [facility] w/LOC x2 min (wife found him unresponsive on bed), found to have RLE DVT, submassive saddle PE w/acute hypoxic RF, echo: at least mild RV strain, s/p mechanical thrombectomy 5/8/24 (no systemic thrombolytics 2/2 risk of bleed), started on heparin and discharged on eliquis; hgb 7.3 during admission- given 1uprbc  - 5/7/24 CT PE:   Large saddle pulmonary embolism, with most extensive emboli burden extending into branches of the right pulmonary artery, with cyst occlusion of right lower lobe anterior basilar segment   pulmonary arteries.Multiple patchy consolidations and scattered small irregular  opacities with regional groundglass, the largest in the lateral  subpleural right upper lobe, suspect evolving pulmonary infarcts. Pulmonary metastases, or focal infiltrates could have a similar appearance. Persistent pleural-based consolidation in the medial left lower lobe, with  small effusion (slightly decreased). Markedly enlarged pulmonary artery, consistent with elevated  pulmonary arterial pressures. Multichamber cardiac enlargement, biventricular, with  concentric LVH.   - 5/8/24 b/l LE doppler: Acute partially occlusive thrombus within one of  the posterior tibial veins at the right mid calf. This is part of  the deep venous system   - 5/8/24 mechanical thrombectomy: evacuated a large amount  of thrombus including the saddle thrombus and occlusive lobar and  segmental thrombus. Follow-up angiogram demonstrates excellent flow through the main, right, lobar and segmental pulmonary arteries.     PLAN:  - pt currently on eliquis 5mg BID    #macrocytic anemia  - per Care Everywhere labs:  12/23 labs  Hgb 7.9-8.9, MCV   Ferritin 1827, iron sat 60, TIBC 111, iron 67  Haptoglobin 177, absolute retic 0.1011  - 1/24 labs  Hgb 11.2, MCV 98  Ferritin 451, iron sat 39, TIBC 179, iron 69  B12 1082, RBC folate >1312  TSH 2.77  CMP Cr 1.72, GFR 41, calcium 9.6, total protein 7.1, albumin 3.8, total bili 0.3, absolute retic 0.111  - 3/24   SPEP and SIFE no monoclonal protein  Kappa 5.52, lambda 3.64, k/l ratio 1.52 normal  Quantitative Ig NA  - 5/24 5/11/24 hgb 7.3 (at [facility], admission for DVT, submassive saddle PE)- given 1 uprbc; 5/16/24 hgb 8.7, ferritin 389, iron sat 15, TIBC 200, iron 30, B12 838, TSH 5.32, free T4 1.24    PLAN:  - macrocytosis likely 2/2 elevated retic  - anemia 2/2 CKD, chemotherapy, iron deficiency  - IV iron as above    #renal transplant  - [facility]  -  [place] in  [place] in [place]   - on tacrolimus and prednisone 10 mg daily (off cellcept since 12/23)  - 12/23 Cr 1.6, GFR 45  - 1/24 MRI peritoneum: Kidneys: Atrophic native kidneys. T1 hyperintense, non-enhancing hemorrhagic/proteinaceous cyst in the posterior mid pole of the left kidney. T2 hyperintense, nonenhancing right renal cortical cysts. Right lower quadrant renal transplant with stable moderate hydronephrosis upstream of the ureterovesical junction. No suspicious renal transplant lesion.  - 2/24 I discussed the above with the pts nephrologist Dr. Cat Goodson  She confirms pt is off of cellcept   His tacrolimus was discontinued 2/24, when pt was admitted for influenza and his prednisone was increased to 20mg  Plan to decrease prednisone back to 10mg when starting chemotherapy  Ok to proceed with 5FU and LV  If absolutely need to use avastin, can do this, need to watch kidney function/vasculature closely   Will start with 5FU/LV and see how pt tolerates. If not surgical candidate based on repeat scans, can add avastin at that time and have close follow up with nephrology.    PLAN:  - pt off tacrolimus and cellcept, currently on prednisone 20mg PO daily   - continue follow up with Dr. 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

## 2024-06-21 NOTE — Clinical Note
6/21/2024      Berny Estrada  [address] [id number]  [place] [address]      Dear Colleague,    Thank you for referring your patient, Berny Estrada, to the [facility]. Please see a copy of my visit note below.    Video-Visit Details     Video Start Time: 2:23PM     Type of service:  Video Visit     Video End Time: 2:53PM    Originating Location (pt. Location): Home     Distant Location (provider location):  [facility] off site     Platform used for Video Visit: Well     [facility]    Hematology/Oncology Established Patient Follow-Up Note      Today's Date: 6/21/2024    Reason for follow-up: cecal adenocarcinoma     HISTORY OF PRESENT ILLNESS: Berny Estrada is a 74 year old male who presents for follow-up    Patient has medical history including right renal transplant (2014), hypertension, type 2 diabetes with proliferative diabetic retinopathy and long-term insulin use, CKD stage III, CAD, COPD, GERD, colon polyps (tubular adenoma), esophageal candidiasis, cholelithiasis, sleep apnea, hx of tobacco use (quit 50 years ago)    -12/1/23- 12/9/23 hospitalized 2/2 anemia, hgb 7.9 w/melena x few months (was on iron)  - 12/6/23 CEA 11.69, 12/7/23 CEA 10  - 12/14/2023 EGD: Patchy white plaques in proximal esophagus and midesophagus suspicious for candidiasis, otherwise normal  -12/14/2023 [facility]:  - Fungating partially obstructing, partially circumferential (involving two thirds of the lumen circumference) large mass found in the cecum, including IC valve, with oozing  - 4 sessile polyps in ascending colon, 3-5 mm in size  - 2 mm sessile polyp in transverse colon  - 2 sessile polyps in the ascending colon, 4-5 mm in size  - 6 sessile polyps in the rectosigmoid colon, 3-8 mm in size  - Medium sized external hemorrhoids  PATHOLOGY:  1.  MID ESOPHAGUS, BIOPSY:  - Squamous mucosa  with superficial colonization by fungal organisms positive for PAS stain, morphologically compatible with Candida species    2.  COLON, CECUM MASS, BIOPSY:  - Adenocarcinoma, moderately differentiated  - Loss of expression of MLH1 and PMS2; intact expression of MSH2 and MSH6   - MLH1 promoter methylation: POSITIVE   - BRAF V600 mutation positive     3.  ASCENDING COLON, POLYPECTOMY:  - Sessile serrated adenoma with focal high-grade dysplasia  - Separate minute fragment of adenocarcinoma     4.  TRANSVERSE COLON, POLYPECTOMY:  - Tubular adenoma; negative for high-grade dysplasia     5.  DESCENDING COLON, POLYPECTOMY:  - Tubular adenoma; negative for high-grade dysplasia     6.  RECTOSIGMOID COLON, POLYPECTOMY:  - Sessile serrated adenoma with focal cytologic dysplasia  - No evidence of high-grade dysplasia or invasive carcinoma    -12/21/23 CT CAP without contrast:  -Cardiomegaly, coronary artery stenting calcifications  - Dilated main pulmonary artery, 3.8 cm  - Calcified hilar lymphadenopathy  - RML and lingular bronchiectatic changes with associated fibroatelectasis, may be secondary to chronic infectious/inflammatory process, Ill-defined groundglass opacities predominantly affecting DERIC and lingula, Dependent atelectasis  - Small left-sided pleural effusion  -Atrophic thyroid with ill-defined subcentimeter nodularity  - Cholelithiasis  - Atrophic appearance of native kidneys, right lower quadrant renal transplant with mild hydronephrosis  - 1.6 cm hyperattenuating lesion of left kidney  - Ill-defined soft tissue thickening about cecum, likely corresponding to known cecal mass  - Associated thickening of peritoneal planes in the region  - Enlarged 2.1 x 1.7 cm mesenteric node adjacent to cecum  - Borderline enlarged 0.9 cm perirectal node  IMPRESSION:   1. Cecal mass with enlarged nodes of the right lower quadrant  including a 2.1 x 1.7 cm lymph node, which are suspicious for  metastatic disease. Indeterminate  thickening and nodularity of the  peritoneal surfaces near the cecal mass could be due to fluid or  mesenteric spread of disease.  3. Indeterminate 1.6 cm lesion of the left native kidney. Consider  dedicated renal MRI with contrast for further characterization.  4. Right lower quadrant renal transplant with mild hydronephrosis.  5. Ill-defined groundglass opacities of the left upper lobe and  lingula suspicious for an infectious/inflammatory etiology. Additional  bronchiectatic and fibroatelectatic changes of the right middle lobe  and lingula suggesting a chronic inflammatory process.  6. Small left-sided pleural effusion.  7. Dilated main pulmonary artery as can be seen with pulmonary  arterial hypertension.  8. Cholelithiasis without findings to suggest acute cholecystitis.    -12/21/23 CEA 31.3      Pt denies change in stool caliber, diarrhea, abdominal pain, abdominal distention, nausea, vomiting, no weight loss. Stool is dark (on iron), reported to be black, no BRBPR, no clots. Pt reports weight loss 10/23-11/23 10-15 lbs, then gain 10 over past few months    Pt reports right leg swelling 12/23 onwards with some improvement and palpated knot in left leg. B/l LE doppler 12/8/23 negative for DVT    Regarding ECOG:  Significant decline over the past 6 months, currently using wheelchair and supplemental oxygen  Working with PT 2x/week  Uses cane and walker prn recently, a few months ago was more consistent  Wife helps with most ADLs (cooking, cleaning, groceries, driving). Is able to go to bathroom, shower and dress himself.  Spends about 50% of day in recliner per pt, less per wife  PT 2x/week     Family history:  Brother- colorectal cancer, age 61, s/p surgery, receiving chemotherapy    Regarding kidney transplant  - right kidney transplant 2014  - on tacrolimus and prednisone 10 mg daily (off cellcept since 12/23)  - 12/23 Cr 1.6, GFR 45    - 1/19/24 MRI peritoneum w/contrast:  -  No appreciable focal hepatic  lesion, though evaluation is limited by marked respiratory motion artifact..   - Subcentimeter T2 hyperintense cystic foci  in the pancreatic head and body/tail. 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 Recommend attention on  follow-up.  - Hypoenhancing cecal mass measuring approximately 4.0 x 5.8 x 5.7 cm just below the ileocecal valve.  - Enlarged right lower quadrant/pericecal mesenteric lymph  nodes measuring up to 2.0 cm short axis  - Small left pleural effusion with adjacent compressive  atelectasis. Cardiomegaly  -  Trace ascites, greatest in the pericecal region without  appreciable suspicious peritoneal nodularity    - 2/28/24 port placement    - 3/6/24 started infusional 5FU and LV    - 4/24/24 CT CAP w/o contrast 2/2 kidney function  1.  Grossly unchanged size/appearance of the cecal mass with decreased adjacent right lower quadrant mesenteric lymph nodes (10 mm lymph node previously measured 18 mm when measured similarly).  2.  Slightly increased size of a few mesorectal lymph nodes versus dependent peritoneal deposits in the pelvis, suspicious for metastatic disease. Continued attention on follow-up is recommended. (Slightly increased size of a few mesorectal lymph nodes versus dependent peritoneal   deposits. For instance 12 mm nodules both previously measured 9 mm)  3.  No definite new sites of disease in the chest, abdomen or pelvis.  4.  Unchanged mild pelvocaliectasis of the right lower quadrant transplant kidney.    -4/24/24 MR peritoneum w/wo contrast:  - Hypoenhancing cecal mass measuring approximately 3.6 x 5.1 x 5.2 cm just below the ileocecal valve, previously 4.2 x 6.5 x 5.9 cm, when measured similarly  -  Decreased size of several enlarged right lower quadrant mesenteric lymph nodes, the largest measuring 1.3 cm short axis, previously 2.1 cm.  - Trace ascites,greatest in the pericecal region, though decreased since prior.  Slightly increased conspicuity of enhancing  soft tissue abnormality along the posterolateral aspect of the cecum  - Increased conspicuity of enhancing soft tissue abnormality in the pelvis between the rectum and bladder with the largest nodule measuring 2.0 x 1.4 x 1.2 cm, previously 1.7 x 1.3 x 1.0 cm.  - No appreciable suspicious hepatic lesion, though evaluation is limited by marked respiratory motion artifact.  - Right lower quadrant renal transplant with stable moderate hydronephrosis upstream of the ureteropelvic junction.  - Cholelithiasis.  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 Recommend attention on follow-up.    - 5/8/24-5/13/[facility] min (wife found him unresponsive on bed), found to have RLE DVT, submassive saddle PE w/acute hypoxic RF, echo: at least mild RV strain, s/p mechanical thrombectomy 5/8/24 (no systemic thrombolytics 2/2 risk of bleed), started on heparin and discharged on eliquis, hgb 7.3 during admission- given 1uprbc  - 5/7/24 CT PE: Large saddle pulmonary embolism, which continues extensively into   branches of the right pulmonary artery, including proximal emboli   into the right upper, right middle, and most extensively, right   lower lobe, with likely complete occlusion of pulmonary arteries   extending into the lateral basilar segment.     4.7 x 3.7 x 2.2 cm irregular peripheral pleural-based dense   consolidation lateral right upper lobe, with penumbra of   groundglass, extending to the hilum, favored to represent   evolving pulmonary infarct. Mass or infiltrate could have a   similar appearance.     Additional areas of scattered groundglass, and small irregular   opacities, example (series 5):     - 2.2 x 1.5 cm patchy consolidation central right middle   lobe, image 141     - 2 x 1.8 cm posterior pleural-based left lung apex, image 40     may also represent additional infarct, pulmonary metastasis, or   focal pneumonitis.      Scattered additional small irregular opacities are seen   bilaterally, most extensively in the central right lung.     A prior small to moderate right pleural effusion is resolved.   Irregular patchy left lower lobe pleural-based consolidation   appears similar to prior, with a small pleural effusion, which is   mildly decreased.   - Main   pulmonary artery 4 cm, markedly enlarged. Dilated right ventricle   with ventricular strain pattern. Likely concentric LVH. No   pericardial effusion. Small mediastinal lymph nodes, and   borderline subcarinal node measuring up to 12 mm, unchanged.      1. Large saddle pulmonary embolism, with most extensive emboli   burden extending into branches of the right pulmonary artery,   with cyst occlusion of right lower lobe anterior basilar segment   pulmonary arteries. Findings were discussed with Dr. Tiffanie Arndt of the emergency service at 11:45 AM central time on   5/8/2024, who verbalized understanding.     2. Multiple patchy consolidations and scattered small irregular   opacities with regional groundglass, the largest in the lateral   subpleural right upper lobe, suspect evolving pulmonary infarcts.   Pulmonary metastases, or focal infiltrates could have a similar   appearance.     3. Resolved prior right pleural effusion, with persistent   pleural-based consolidation in the medial left lower lobe, with   small effusion (slightly decreased).     5. Markedly enlarged pulmonary artery, consistent with elevated   pulmonary arterial pressures.     6. Multichamber cardiac enlargement, biventricular, with   concentric LVH.   - 5/8/24 b/l LE doppler: Acute partially occlusive thrombus within one of   the posterior tibial veins at the right mid calf. This is part of   the deep venous system   - 5/8/24 mechanical thrombectomy: evacuated a large amount   of thrombus including the saddle thrombus and occlusive lobar and segmental thrombus. Follow-up angiogram demonstrates excellent  flow through the main, right, lobar and segmental pulmonary arteries.     INTERIM HISTORY:      - 5/20/24 Case discussed at 5/20/24 [facility] tumor board  Reconsider immunotherapy over avastin  Re-discuss risk of renal transplant rejection vs progression of disease  No role for surgery at this time given concern for peritoneal disease  - 5/22/24 received message from home infusion services regarding hypoxia on RA as low as 86%, REINA, LLL crackles and RUL wheezing, nursing team directed pt to go to ER  - 5/30/24 pt missed appt and C6 of tx 2/2 [facility] for [facility]argy, REINA, hypoxia on RA 75-80% per wife, was given IV iron and 1uprbc, discharged on O2 via NC 1-2 L  - 6/17/24 rescheduled C6 cancelled 2/2 pt reporting black stools x 2 weeks, fatigue, anemia hgb 7.6 while on eliquis (5/24 hgb 8.7, prior baseline 9-10) and thrombocytopenia plt 119K (at baseline), [facility] and [facility] him    REGIMEN:  Infusional 5FU and LV  q14 days, up to 24 weeks of perioperative chemo total   C1D1 3/6/24, C2D1 4/4/24, C3D1 4/18/24, C4D1 5/2/24 (complicated by 5/8/24-5/13/24 admitted [facility]; RLE DVT, submassive saddle PE w/acute hypoxic RF, echo: at least mild RV strain, s/p mechanical thrombectomy 5/8/24 (no systemic thrombolytics 2/2 risk of bleed), started on heparin and discharged on eliquis, hgb 7.3 during admission- given 1uprbc), C5D1 5/16/24 (complicated by hospitalization for hypoxia)  LV 350mg/m2 day 1  5FU 1200mg/m2 continuous infusion day 1-2 (total 2,400mg/m2 IV over 46-48 hours)  Emetic risk: low  Febrile neutropenia risk: low    C6D1 5/30/24 planned and delayed 2/2 hospitalization for hypoxia 5/2024 and then delayed again 2/2 ER visit for melena w/anemia while on eliquis 6/17/24    Treatment related AE:  - weakness- C1D2- 3/8/24- 3/10/24 admission at [facility] for mechanical fall, leaning forward to pick something up off the ground in the bathroom when he fell  forward impacting his right eye requiring sutures for laceration, no LOC, CT showed right orbital roof, wall, and floor fractures with. Right eye pressure 33, left eye 11. Seen by ophthalmology, ENT, neurosurgery- no acute intervention; C2D1- ongoing weakness, needed assistance w/transfers; C3 and C4- pt reports weakness is better after a few days, states he ambulates independently but wife reports he is stumbling more often, now agreeable to using walker; appetite ok, drinking at least 32oz per day  - hyperglycemia w/episodes of hypoglycemia- labile BS, C2 4/4/24 BS 50s- 400s, dexamethasone discontinued, C3 BS 190s, C4 BS 300s, endocrine consult pending  - macrocytic anemia- BL 11, C2 onwards hgb 10, work up showed no nutritional def, elevated retic; 5/11/24 hgb 7.3 ([facility], [facility] D[facility], submassive saddle PE)- given 1 uprbc; 5/16/24 hgb 8.7, ferritin 389, iron sat 15, TIBC 200, iron 30, B12 838, TSH 5.32, free T4 1.24  - thrombocytopenia- BL plt 172K, C2 onwards plt 100K, 5/16/24 plt 131K  - GUANACO- 5/13/24 Cr 1.9, 5/16/24 Cr 2.47, given IVF 5/16/24 and 5/17/24, 5/17/24 Cr 2.04    RESOLVED:  - hyperkalemia- 5/16/24 K 5.5, 5/16/24 K 5.5, EKG- sinus, AZ normal, QRS normal, T waves tented but not peaked, unable to give insulin (not available), gave high dose albuterol 10mg for reversal, avoid diuretics w/GUANACO; 5/17/24 K 5.0      Treatment related AE:  - weakness- C1D2- 3/8/24- 3/10/24 admission at [facility] for mechanical fall w/facial fractures and laceration requiring stitches, improved now  - hyperglycemia- BS still in 300s despite discontinuing dexamethasone (on prednisone 20mg PO daily for renal transplant), endocrine consult pending scheduling  - macrocytic anemia- 5/11/24 hgb 7.3 (at [facility], [facility] D[facility], submassive saddle PE)- given 1 uprbc; 5/16/24 hgb 8.7 w/iron sat 15, based on Ganzoni equation w/goal hgb 12 and 500mg needed for iron stores, pts iron deficit is 1300 mg, will rx ferric  carboxymaltose 750mg x2 doses, 1 week apart. Risk of infusion rxn d/w pt and he would like to proceed. ***  - thrombocytopenia- 5/16/24 plt 131K, monitor while on eliquis***  - GUANACO- 5/13/24 Cr 1.9, 5/16/24 Cr 2.47 improved to 2.04 w/IVF x2 days, ***    Half way through virtual visit, it had to be converted to telephone visit 2/2 poor connection    REVIEW OF SYSTEMS:   A 14 point ROS was reviewed with pertinent positives and negatives in the HPI.        HOME MEDICATIONS:  Current Outpatient Medications   Medication Sig Dispense Refill    albuterol (PROAIR HFA/PROVENTIL HFA/VENTOLIN HFA) 108 (90 Base) MCG/ACT inhaler INHALE 2 PUFFS BY INHALATION EVERY 6 HOURS AS NEEDED FOR SHORTNESS OF BREATH, FOR SECRETIONS      amLODIPine (NORVASC) 10 MG tablet Take 10 mg by mouth daily      apixaban ANTICOAGULANT (ELIQUIS) 5 MG tablet 10 mg      carvedilol (COREG) 12.5 MG tablet Take 12.5 mg by mouth 2 times daily      insulin aspart U-10, diluted conc 10 units/mL, (NOVOLOG) 10 unit/ml injection Inject Subcutaneous 3 times daily (before meals) 10-16 units      INSULIN GLARGINE SC Inject 34 Units Subcutaneous at bedtime 7/11 am      ipratropium - albuterol 0.5 mg/2.5 mg/3 mL (DUONEB) 0.5-2.5 (3) MG/3ML neb solution INHALE 3ML IN NEBULIZER BY INHALATION FOUR TIMES A DAY AS NEEDED FOR SHORTNESS OF BREATH      lidocaine-prilocaine (EMLA) 2.5-2.5 % external cream Apply topically as needed for moderate pain 30 g 1    loratadine (CLARITIN) 10 MG tablet 10 mg      Magnesium Oxide 420 MG TABS Take 1 tablet by mouth 2 times daily      mycophenolate, IDS 3865, (CELLCEPT) 250 MG capsule Take 2 tablets by mouth 2 times daily      OMEPRAZOLE PO Take 1 tablet by mouth daily      ondansetron (ZOFRAN) 8 MG tablet Take 1 tablet (8 mg) by mouth every 8 hours as needed for nausea (vomiting) 30 tablet 2    ondansetron (ZOFRAN) 8 MG tablet Take 1 tablet (8 mg) by mouth every 8 hours as needed for nausea (vomiting) 30 tablet 2    predniSONE (DELTASONE)  10 MG tablet Take 40 mg by mouth daily      prochlorperazine (COMPAZINE) 10 MG tablet Take 1 tablet (10 mg) by mouth every 6 hours as needed for nausea or vomiting 30 tablet 2    prochlorperazine (COMPAZINE) 10 MG tablet Take 1 tablet (10 mg) by mouth every 6 hours as needed for nausea or vomiting 30 tablet 2    tacrolimus (PROGRAF-GENERIC EQUIVALENT) 5 MG capsule Take 5 mg by mouth daily      tamsulosin (FLOMAX) 0.4 MG capsule Take 0.4 mg by mouth daily      VITAMIN D, CHOLECALCIFEROL, PO Take 1 tablet by mouth once a week           ALLERGIES:  No Known Allergies      PAST MEDICAL HISTORY:  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    Sleep apnea     has equipment  not always uses    Type 2 diabetes mellitus without complications (H)          PAST SURGICAL HISTORY:  Past Surgical History:   Procedure Laterality Date    EYE SURGERY  7/12/2016    CE IOL PPV RE    INSERT PORT VASCULAR ACCESS Right 2/28/2024    Procedure: Insert port vascular access;  Surgeon: Damián Estrada MD;  [place] [facility] [place]     CHEST PORT PLACEMENT > 5 YRS OF AGE  2/28/2024    PANRETINAL PHOTOCOAGULATION (PRP) OD (RIGHT EYE)      last 9/18/13    PANRETINAL PHOTOCOAGULATION (PRP) OS (LEFT EYE)  4/15/14    [facility]    PHACOEMULSIFICATION CLEAR CORNEA WITH STANDARD IOL, VITRECTOMY PARSPLANA 25 GAGUE, COMBINED Right 7/12/2016    Procedure: COMBINED PHACOEMULSIFICATION CLEAR CORNEA WITH STANDARD INTRAOCULAR LENS IMPLANT, VITRECTOMY PARSPLANA 25 GAUGE;  Surgeon: Mireille Don MD;  [facility]         SOCIAL HISTORY:  Social History     Socioeconomic History    Marital status:      Spouse name: Not on file    Number of children: Not on file    Years of education: Not on file    Highest education level: Not on file   Occupational History    Not on file    Tobacco Use    Smoking status: Former    Smokeless tobacco: Never   Substance and Sexual Activity    Alcohol use: Not on file    Drug use: Not on file    Sexual activity: Not on file   Other Topics Concern    Not on file   Social History Narrative    Not on file           Social Determinants of Health     Financial Resource Strain: Not on file   Food Insecurity: Not on file   Transportation Needs: Not on file   Physical Activity: Not on file   Stress: Not on file   Social Connections: Not on file   Interpersonal Safety: Not on file   Housing Stability: Not on file         FAMILY HISTORY:  Family History   Problem Relation Age of Onset    Glaucoma No family hx of     Macular Degeneration No family hx of          PHYSICAL EXAM:  Vital signs:  There were no vitals taken for this visit.       LABS:    PATHOLOGY:      IMAGING:      ASSESSMENT/PLAN:  Berny Estrada is a 74 year old male with:      # moderately differentiated cecum adenocarcinoma, MSI-high (loss of MLH1 and PMS2, MLH1 promoter hypermethylated), BRAF V600 mutation positive  -12/14/23 diagnostic colonoscopy: Fungating partially obstructing, partially circumferential (involving two thirds of the lumen circumference) large mass found in the cecum, including IC valve, with oozing, PATHOLOGY: COLON, CECUM MASS, BIOPSY: Adenocarcinoma, moderately differentiated, Loss of expression of MLH1 and PMS2; intact expression of MSH2 and MSH6, MLH1 promoter methylation: POSITIVE ; BRAF V600 mutation positive (not Prasad syndrome)  - CRC NGS: Detected Alterations of Known or Potential Pathogenicity: BRAF V600E, TMB Score: 73.131 mut/Mb  -12/21/23 CT CAP without contrast: Cecal mass with enlarged nodes of the right lower quadrant including a 2.1 x 1.7 cm lymph node, which are suspicious for metastatic disease. Indeterminate thickening and nodularity of the peritoneal surfaces near the cecal mass could be due to fluid or mesenteric spread of disease. Indeterminate 1.6 cm  lesion of the left native kidney. Small left-sided pleural effusion.  -12/7/23 CEA 10,  12/21/23 CEA 31.3, 1/24 CEA 48.5    -1/24 MR peritoneum: no peritoneal disease, Hypoenhancing cecal mass measuring approximately 4.0 x 5.8 x 5.7 cm just below the ileocecal Valve, Enlarged RLQ/pericecal mesenteric lymph nodes measuring up to 2.0 cm short axis     TREATMENT  - pt has at least stage 3 disease with enlarged pericecal mesenteric LN  - d/w Dr. Reid, will attempt to give chemotherapy up front for approximately 3 months and then restage to see if he is a surgical candidate  - ECOG 2 at baseline at best   - for right sided tumors w/BRAF mutation, FOLFIRINOX or FOLFIRI is used w/avastin, however given pts co-morbidities, I do not think the patient could tolerate this regimen. Will avoid immunotherapy given pt is on immunosuppressive medications for renal transplant   - 3/6/24 started infusional 5FU and LV  - 4/24/24 CT CAP and MR peritoneum (after 4 cycles) showed enlarging soft tissue abnormality in pelvis b/w rectum and bladder  - case discussed at [facility] tumor board recs: Reconsider immunotherapy over avastin, Re-discuss risk of renal transplant rejection vs progression of disease, No role for surgery at this time given concern for peritoneal disease    REGIMEN:  Infusional 5FU and LV  q14 days, up to 24 weeks of perioperative chemo total   C1D1 3/6/24, C2D1 4/4/24, C3D1 4/18/24, C4D1 5/2/24 (complicated by 5/8/24-5/13/24 [facility]; RLE DVT, submassive saddle PE w/acute hypoxic RF, echo: at least mild RV strain, s/p mechanical thrombectomy 5/8/24 (no systemic thrombolytics 2/2 risk of bleed), started on heparin and discharged on eliquis, hgb 7.3 during admission- given 1uprbc), C5D1 5/16/24 (complicated by [facility])  LV 350mg/m2 day 1  5FU 1200mg/m2 continuous infusion day 1-2 (total 2,400mg/m2 IV over 46-48 hours)  Emetic risk: low  Febrile neutropenia risk: low    C6D1  5/30/24 planned and delayed 2/2 hospitalization for hypoxia 5/2024 and then ER visit for melena w/anemia while on eliquis 6/17/24    ***      IMAGING:  - 4/24/24 CT CAP w/o contrast 2/2 kidney function  1.  Grossly unchanged size/appearance of the cecal mass with decreased adjacent right lower quadrant mesenteric lymph nodes (10 mm lymph node previously measured 18 mm when measured similarly).  2.  Slightly increased size of a few mesorectal lymph nodes versus dependent peritoneal deposits in the pelvis, suspicious for metastatic disease. (For instance 12 mm nodules both previously measured 9 mm)    -4/24/24 MR peritoneum w/wo contrast:  - Hypoenhancing cecal mass measuring approximately 3.6 x 5.1 x 5.2 cm just below the ileocecal valve, previously 4.2 x 6.5 x 5.9 cm, when measured similarly  -  Decreased size of several enlarged right lower quadrant  mesenteric lymph nodes, the largest measuring 1.3 cm short axis, previously 2.1 cm.  - Trace ascites,greatest in the pericecal region, though decreased since prior.  - Slightly increased conspicuity of enhancing soft tissue abnormality along the posterolateral aspect of the cecum  - Increased conspicuity of enhancing soft tissue abnormality in the pelvis between the rectum and bladder with the largest nodule measuring 2.0 x 1.4 x 1.2 cm, previously 1.7 x 1.3 x 1.0 cm.    TUMOR MARKERS:  12/21/23 CEA 31.3  1/18/24 CEA 48.5 (prior to starting tx)  4/18/24 CEA 24.5  6/17/24 CEA 14.7    PLAN:  - he had progression of disease on infusional 5FU and LV, based on 4/24 scans. Since then, he has had 2 hospitalizations for 1st for DVT, saddle PE requiring thrombectomy, 2nd for hypoxia and ER visit for anemia w/suspected GIB  - pts ECOG is 2 at best   - we discussed tumor board recs: Reconsider immunotherapy over avastin, Re-discuss risk of renal transplant rejection vs progression of disease, No role for surgery at this time given concern for peritoneal disease  - We discussed  the following data regarding use of immunotherapy in patients with solid organ transplants, notably re: renal transplant-pt is on chronic steroids, prednisone 20mg PO daily, off cellcept and tacrolimus:    Werner CASE et al; Immune Checkpoint Inhibitors in Solid Organ Transplant Consortium. A multi-center study on safety and efficacy of immune checkpoint inhibitors in cancer patients with kidney transplant. Kidney Int. 2021 Jul;100(1):196-205. doi: 10.1016/j.kint.2020.12.015. Epub 2020 Dec 24. PMID: 14698785; PMCID: IGO0229379.    Jimbo S et al. Immune checkpoint inhibitors for solid organ transplant recipients: clinical updates. [place] J Transplant. 2022 Jun 30;36(2):82-98. doi: 10.4285/kjt.22.0013. Epub 2022 Gigi 3. PMID: 91825423; PMCID: [id number].    Fiordaliza Cardoso et al. Fine-tuning tumor- and allo-immunity: advances in the use of immune checkpoint inhibitors in kidney transplant recipients, Clinical Kidney Journal, Volume 17, Issue 4, April 2024, [email or web address], [email or web address]    Rossi Wright, Gabino Perez, John Whatley, Ash Larios, Immune checkpoint blockade for organ-transplant recipients with cancer: A review,   Journal of Cancer, Volume 175, 2022, Pages 326-335, ISSN [id number], [email or web address].        - pt would like to discuss further with his nephrologist prior to deciding whether he wants to proceed with immunotherapy   - I will also reach out to the pts nephrologist***  - repeat PET 7/24 as new baseline prior to starting immunotherapy ***  - message apollo***  - hold on adding avastin 2/2 DVT, submassive PE on eliquis      #submassive PE s/p thrombectomy  #RLE DVT  - 5/8/24-5/13/24 [facility] w/LOC x2 min (wife found him unresponsive on bed), found to have RLE DVT, submassive saddle PE w/acute hypoxic RF, echo: at least mild RV strain, s/p mechanical thrombectomy 5/8/24 (no systemic thrombolytics 2/2 risk of  bleed), started on heparin and discharged on eliquis; hgb 7.3 during admission- given 1uprbc  - 5/7/24 CT PE:   Large saddle pulmonary embolism, with most extensive emboli burden extending into branches of the right pulmonary artery, with cyst occlusion of right lower lobe anterior basilar segment  pulmonary arteries.Multiple patchy consolidations and scattered small irregular  opacities with regional groundglass, the largest in the lateral  subpleural right upper lobe, suspect evolving pulmonary infarcts. Pulmonary metastases, or focal infiltrates could have a similar appearance. Persistent pleural-based consolidation in the medial left lower lobe, with  small effusion (slightly decreased). Markedly enlarged pulmonary artery, consistent with elevated  pulmonary arterial pressures. Multichamber cardiac enlargement, biventricular, with  concentric LVH.   - 5/8/24 b/l LE doppler: Acute partially occlusive thrombus within one of  the posterior tibial veins at the right mid calf. This is part of  the deep venous system   - 5/8/24 mechanical thrombectomy: evacuated a large amount  of thrombus including the saddle thrombus and occlusive lobar and  segmental thrombus. Follow-up angiogram demonstrates excellent flow through the main, right, lobar and segmental pulmonary arteries.     PLAN:  - pt currently on eliquis 5mg BID    #macrocytic anemia  - per Care Everywhere labs:  12/23 labs  Hgb 7.9-8.9, MCV   Ferritin 1827, iron sat 60, TIBC 111, iron 67  Haptoglobin 177, absolute retic 0.1011  - 1/24 labs  Hgb 11.2, MCV 98  Ferritin 451, iron sat 39, TIBC 179, iron 69  B12 1082, RBC folate >1312  TSH 2.77  CMP Cr 1.72, GFR 41, calcium 9.6, total protein 7.1, albumin 3.8, total bili 0.3, absolute retic 0.111  - 3/24   SPEP and SIFE no monoclonal protein  Kappa 5.52, lambda 3.64, k/l ratio 1.52 normal  Quantitative Ig NA  - 5/24 5/11/24 hgb 7.3 (at [facility], admission for DVT, submassive saddle PE)- given 1 uprbc; 5/16/24 hgb  8.7, ferritin 389, iron sat 15, TIBC 200, iron 30, B12 838, TSH 5.32, free T4 1.24    PLAN:  - macrocytosis likely 2/2 elevated retic  - anemia 2/2 CKD, chemotherapy, iron deficiency  - check ferritin, iron studies   - based on Ganzoni equation w/goal hgb 14 and 500mg needed for iron stores, pts iron deficit is ***mg  - will rx ferric carboxymaltose 750mg x2 doses, 1 week apart. Risk of infusion rxn d/w pt and he would like to proceed. ***    #renal transplant  - [facility]  -  S/p cadaveric transplant in [facility] in 2014   - on tacrolimus and prednisone 10 mg daily (off cellcept since 12/23)  - 12/23 Cr 1.6, GFR 45  - 1/24 MRI peritoneum: Kidneys: Atrophic native kidneys. T1 hyperintense, non-enhancing hemorrhagic/proteinaceous cyst in the posterior mid pole of the left kidney. T2 hyperintense, nonenhancing right renal cortical cysts. Right lower quadrant renal transplant with stable moderate hydronephrosis upstream of the ureterovesical junction. No suspicious renal transplant lesion.  - 2/24 I discussed the above with the pts nephrologist Dr. Cat Goodson  She confirms pt is off of cellcept   His tacrolimus was discontinued 2/24, when pt was admitted for influenza and his prednisone was increased to 20mg  Plan to decrease prednisone back to 10mg when starting chemotherapy  Ok to proceed with 5FU and LV  If absolutely need to use avastin, can do this, need to watch kidney function/vasculature closely   Will start with 5FU/LV and see how pt tolerates. If not surgical candidate based on repeat scans, can add avastin at that time and have close follow up with nephrology.    PLAN:  - pt off tacrolimus and cellcept, currently on prednisone 20mg PO daily   - continue follow up with Dr. 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        Sincerely,        SHAUNNA BELL DO

## 2024-06-28 NOTE — PROGRESS NOTES
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   Berny Estrada is a 74 year old male with medical history including right renal transplant (2014), hypertension, type 2 diabetes with proliferative diabetic retinopathy and long-term insulin use, CKD stage III, CAD, COPD, GERD, colon polyps (tubular adenoma), esophageal candidiasis, cholelithiasis, sleep apnea, hx of tobacco use (quit 50 years ago)-  Presenting complaint of weight loss 10/23-11/23 10-15 lbs, then gain 10 over past few months and Significant ECOG decline over the past 6 months,  using wheelchair and supplemental oxygen  -12/1/23- 12/9/23 hospitalized 2/2 anemia, hgb 7.9 w/melena x few months (was on iron)  - 12/6/23 CEA 11.69, 12/7/23 CEA 10  - 12/14/2023 EGD: Patchy white plaques in proximal esophagus and midesophagus suspicious for candidiasis, otherwise normal  -12/14/2023 diagnostic colonoscopy [facility]:  - Fungating partially obstructing, partially circumferential (involving two thirds of the lumen circumference) large mass found in the cecum, including IC valve, with oozing   - Adenocarcinoma, moderately differentiated   - Loss of expression of MLH1 and PMS2; intact expression of MSH2 and MSH6   - MLH1 promoter methylation: POSITIVE   - BRAF V600 mutation positive  - 4 sessile polyps in ascending colon, 3-5 mm in size- - Sessile serrated adenoma with focal high-grade dysplasia and Separate minute fragment of adenocarcinoma  - 2 mm sessile polyp in transverse colon- Tubular adenoma; negative for high-grade dysplasia  - 2 sessile polyps in the ascending colon, 4-5 mm in size- Tubular adenoma; negative for high-grade dysplasia  - 6 sessile polyps in the rectosigmoid colon, 3-8 mm in size- Sessile serrated adenoma with focal cytologic dysplasia. No evidence of high-grade dysplasia or invasive carcinoma  - Medium  sized external hemorrhoids  12/21/23 CT CAP without contrast:  -Cardiomegaly, coronary artery stenting calcifications  - Dilated main pulmonary artery, 3.8 cm  - Calcified hilar lymphadenopathy  - RML and lingular bronchiectatic changes with associated fibroatelectasis, may be secondary to chronic infectious/inflammatory process, Ill-defined groundglass opacities predominantly affecting DERIC and lingula, Dependent atelectasis  - Small left-sided pleural effusion  -Atrophic thyroid with ill-defined subcentimeter nodularity  - Cholelithiasis  - Atrophic appearance of native kidneys, right lower quadrant renal transplant with mild hydronephrosis  - 1.6 cm hyperattenuating lesion of left kidney  - Ill-defined soft tissue thickening about cecum, likely corresponding to known cecal mass  - Associated thickening of peritoneal planes in the region  - Enlarged 2.1 x 1.7 cm mesenteric node adjacent to cecum  - Borderline enlarged 0.9 cm perirectal node  IMPRESSION:   1. Cecal mass with enlarged nodes of the right lower quadrant  including a 2.1 x 1.7 cm lymph node, which are suspicious for  metastatic disease. Indeterminate thickening and nodularity of the  peritoneal surfaces near the cecal mass could be due to fluid or  mesenteric spread of disease.  3. Indeterminate 1.6 cm lesion of the left native kidney. Consider  dedicated renal MRI with contrast for further characterization.  4. Right lower quadrant renal transplant with mild hydronephrosis.  5. Ill-defined groundglass opacities of the left upper lobe and  lingula suspicious for an infectious/inflammatory etiology. Additional  bronchiectatic and fibroatelectatic changes of the right middle lobe  and lingula suggesting a chronic inflammatory process.  6. Small left-sided pleural effusion.  7. Dilated main pulmonary artery as can be seen with pulmonary  arterial hypertension.  8. Cholelithiasis without findings to suggest acute cholecystitis.    -12/21/23 CEA  31.3    Family history:  Brother- colorectal cancer, age 61, s/p surgery, receiving chemotherapy    Regarding kidney transplant  - right kidney transplant 2014  - on tacrolimus and prednisone 10 mg daily (off cellcept since 12/23)  - 12/23 Cr 1.6, GFR 45  Treatment:   3/6/2024 started  infusional 5FU and LV    Interval History: Mr Estrada and wife are seen today for continuation of treatment for his cecal cancer with  infusional 5FU and LV. Pt states he is very tired. Wife shares he had fall 4 days prior and has bruise on back but pt states he has no pain- did not hit head.   He feels he is eating well and pushing fluids but wife shares that he has problems getting his recommended fluids in each day. Bowels are constipated and he is not treating- been 3 days since last BM.- this was dark with no red blood.  Oxygen dependant and not active in the day- showering causing much fatigue.   Taking Tacrolimus with history of kidney transplant- no current fevers or rashes.  Denies chest pain.    Rest of comprehensive and complete ROS is reviewed and is negative.   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    Sleep apnea     has equipment  not always uses    Type 2 diabetes mellitus without complications (H)      Current Outpatient Medications   Medication Sig Dispense Refill    albuterol (PROAIR HFA/PROVENTIL HFA/VENTOLIN HFA) 108 (90 Base) MCG/ACT inhaler INHALE 2 PUFFS BY INHALATION EVERY 6 HOURS AS NEEDED FOR SHORTNESS OF BREATH, FOR SECRETIONS      apixaban ANTICOAGULANT (ELIQUIS) 5 MG tablet 10 mg      carvedilol (COREG) 12.5 MG tablet Take 12.5 mg by mouth 2 times daily      insulin aspart U-10, diluted conc 10 units/mL, (NOVOLOG) 10 unit/ml injection Inject Subcutaneous 3 times daily (before meals) 10-16 units      INSULIN GLARGINE SC Inject 34 Units  Subcutaneous at bedtime 7/11 am      ipratropium - albuterol 0.5 mg/2.5 mg/3 mL (DUONEB) 0.5-2.5 (3) MG/3ML neb solution INHALE 3ML IN NEBULIZER BY INHALATION FOUR TIMES A DAY AS NEEDED FOR SHORTNESS OF BREATH      lidocaine-prilocaine (EMLA) 2.5-2.5 % external cream Apply topically as needed for moderate pain 30 g 1    loratadine (CLARITIN) 10 MG tablet 10 mg      Magnesium Oxide 420 MG TABS Take 1 tablet by mouth 2 times daily      mycophenolate, IDS 3865, (CELLCEPT) 250 MG capsule Take 2 tablets by mouth 2 times daily      OMEPRAZOLE PO Take 1 tablet by mouth daily      ondansetron (ZOFRAN) 8 MG tablet Take 1 tablet (8 mg) by mouth every 8 hours as needed for nausea (vomiting) 30 tablet 2    ondansetron (ZOFRAN) 8 MG tablet Take 1 tablet (8 mg) by mouth every 8 hours as needed for nausea (vomiting) 30 tablet 2    predniSONE (DELTASONE) 10 MG tablet Take 40 mg by mouth daily      prochlorperazine (COMPAZINE) 10 MG tablet Take 1 tablet (10 mg) by mouth every 6 hours as needed for nausea or vomiting 30 tablet 2    prochlorperazine (COMPAZINE) 10 MG tablet Take 1 tablet (10 mg) by mouth every 6 hours as needed for nausea or vomiting 30 tablet 2    sulfamethoxazole-trimethoprim (BACTRIM DS) 800-160 MG tablet Take 1 tablet by mouth three times a week      tacrolimus (PROGRAF-GENERIC EQUIVALENT) 5 MG capsule Take 5 mg by mouth daily      tamsulosin (FLOMAX) 0.4 MG capsule Take 0.4 mg by mouth daily      VITAMIN D, CHOLECALCIFEROL, PO Take 1 tablet by mouth once a week      amLODIPine (NORVASC) 10 MG tablet Take 10 mg by mouth daily (Patient not taking: Reported on 6/17/2024)       No current facility-administered medications for this visit.     Facility-Administered Medications Ordered in Other Visits   Medication Dose Route Frequency Provider Last Rate Last Admin    heparin lock flush 100 unit/mL injection 5 mL  5 mL Intracatheter Q8H Stacey Blunt, DO   5 mL at 07/01/24 0925     No Known Allergies    Physical Exam:BP  "131/67 (BP Location: Right arm)   Pulse 93   Temp 98.6  F (37  C)   Ht 1.778 m (5' 10\")   Wt 104.3 kg (230 lb)   SpO2 100%   BMI 33.00 kg/m     Constitutional: NAD, Alert, and cooperative. Comes in wheelchair with wife pushing him, Oxygen on per NC.   ENT: Eyes- sclera clear- conjunctiva pale, No mouth sores  Neck: Supple, No adenopathy.Normal ROM.   Cardiac: Heart rate and rhythm is regular with normal S1 and S2 without murmur  Respiratory: Non labored breathing. Lung sounds clear to auscultation bilaterally  Abdomen: Soft, non-tender, BS noted. No masses or organomegaly  MS: Muscle tone normal, extremities normal with light edema.   Skin: No suspicious lesions or rashes. Bruising to mid back from fall 4 days previous slightly to right of spine.   Neuro: Sensory grossly WNL, gait not observed. A/O x 4.  Lymph: Normal ant/post cervical, axillary, supraclavicular nodes  Psych: Well groomed. Mentation appears normal and affect normal/bright.    The rest of a comprehensive physical examination is deferred due to video visit restrictions     Laboratory/Imaging Results:   Results for orders placed or performed in visit on 07/01/24   Comprehensive metabolic panel     Status: Abnormal   Result Value Ref Range    Sodium 139 135 - 145 mmol/L    Potassium 5.2 3.4 - 5.3 mmol/L    Carbon Dioxide (CO2) 24 22 - 29 mmol/L    Anion Gap 8 7 - 15 mmol/L    Urea Nitrogen 56.7 (H) 8.0 - 23.0 mg/dL    Creatinine 2.37 (H) 0.67 - 1.17 mg/dL    GFR Estimate 28 (L) >60 mL/min/1.73m2    Calcium 9.4 8.8 - 10.2 mg/dL    Chloride 107 98 - 107 mmol/L    Glucose 193 (H) 70 - 99 mg/dL    Alkaline Phosphatase 44 40 - 150 U/L    AST 16 0 - 45 U/L    ALT 18 0 - 70 U/L    Protein Total 5.4 (L) 6.4 - 8.3 g/dL    Albumin 3.3 (L) 3.5 - 5.2 g/dL    Bilirubin Total 0.3 <=1.2 mg/dL   CBC with platelets and differential     Status: Abnormal   Result Value Ref Range    WBC Count 7.0 4.0 - 11.0 10e3/uL    RBC Count 1.58 (L) 4.40 - 5.90 10e6/uL    " Hemoglobin 5.4 (LL) 13.3 - 17.7 g/dL    Hematocrit 18.4 (L) 40.0 - 53.0 %     (H) 78 - 100 fL    MCH 34.2 (H) 26.5 - 33.0 pg    MCHC 29.3 (L) 31.5 - 36.5 g/dL    RDW 18.5 (H) 10.0 - 15.0 %    Platelet Count 129 (L) 150 - 450 10e3/uL    % Neutrophils 74 %    % Lymphocytes 17 %    % Monocytes 7 %    % Eosinophils 0 %    % Basophils 0 %    % Immature Granulocytes 1 %    NRBCs per 100 WBC 4 (H) <1 /100    Absolute Neutrophils 5.2 1.6 - 8.3 10e3/uL    Absolute Lymphocytes 1.2 0.8 - 5.3 10e3/uL    Absolute Monocytes 0.5 0.0 - 1.3 10e3/uL    Absolute Eosinophils 0.0 0.0 - 0.7 10e3/uL    Absolute Basophils 0.0 0.0 - 0.2 10e3/uL    Absolute Immature Granulocytes 0.1 <=0.4 10e3/uL    Absolute NRBCs 0.3 10e3/uL   CBC with platelets differential     Status: Abnormal    Narrative    The following orders were created for panel order CBC with platelets differential.  Procedure                               Abnormality         Status                     ---------                               -----------         ------                     CBC with platelets and d...[[id number]]  Abnormal            Final result                 Please view results for these tests on the individual orders.         Assessment and Plan:   Cecal Adenocarcinoma- Pt started Leucovorin and infusional 5FU on 3/6/2024. He comes today with complaint of fatigue with finding of Hgb of 5.4 with confirming dark stools though constipated- no obvious red blood. 2 weeks prior [facility] with possible GI bleed but no progress was made on discovery of the anemia. He has history of iron infusion as well.   Today I called [facility] and [facility] for review for GI bleed and anemia concerns.   Chemotherapy will be deferred again today- last infusion was 5/18/2024.   - suggest need for constipation treatment with daily stool softener start with 1-2 tabs daily and use miralax if no stools in 2 days.   Will see pt back after hospitalization.      Fall- bruise to back as only result noted from fall- no head injury.Probably affected by anemia and fatigue.No continued bruising seen now but pt is on Eliquis for history of DVD in this normally inactive patient- this will need to be addressed and discussed especially with probable GI bleed as well- should Eliquis be continued.     Nutrition- previous weight loss even prior to diagnosis.Has diabetes and kidney transplant- refused dietician visit. WIll monitor weight with each cycle.     Kidney transplant- pt using tacrolimus and working closely with transplant team- aware of his cancer diagnosis. Using amlodipine and carvedilol for help with BPs. Creatinine is elevated today- made ED aware. Pt admits he is not taking fluids well.     DM Type 2 - admits blood sugars are not well controlled. States they will be contacting endocrinology this week. Insulin dependant. Shared that BSs cause short term increase with steroids given with plan.   The total time of this encounter amounted to 45 minutes. This time included face to face time spent with the patient, prep work, ordering tests, and performing post visit documentation.  Anna Bonilla,Cnp

## 2024-07-01 ENCOUNTER — LAB (OUTPATIENT)
Dept: INFUSION THERAPY | Facility: CLINIC | Age: 74
End: 2024-07-01
Attending: INTERNAL MEDICINE
Payer: MEDICARE

## 2024-07-01 ENCOUNTER — ONCOLOGY VISIT (OUTPATIENT)
Dept: ONCOLOGY | Facility: CLINIC | Age: 74
End: 2024-07-01
Attending: INTERNAL MEDICINE
Payer: MEDICARE

## 2024-07-01 VITALS
WEIGHT: 230 LBS | OXYGEN SATURATION: 100 % | BODY MASS INDEX: 32.93 KG/M2 | HEART RATE: 93 BPM | TEMPERATURE: 98.6 F | HEIGHT: 70 IN | SYSTOLIC BLOOD PRESSURE: 131 MMHG | DIASTOLIC BLOOD PRESSURE: 67 MMHG

## 2024-07-01 DIAGNOSIS — C18.0 MALIGNANT NEOPLASM OF CECUM (H): Primary | ICD-10-CM

## 2024-07-01 DIAGNOSIS — D69.6 THROMBOCYTOPENIA (H): ICD-10-CM

## 2024-07-01 DIAGNOSIS — E11.311 TYPE 2 DIABETES MELLITUS WITH RIGHT EYE AFFECTED BY RETINOPATHY AND MACULAR EDEMA, WITHOUT LONG-TERM CURRENT USE OF INSULIN, UNSPECIFIED RETINOPATHY SEVERITY (H): ICD-10-CM

## 2024-07-01 DIAGNOSIS — C18.0 MALIGNANT NEOPLASM OF CECUM (H): ICD-10-CM
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
## 2024-07-01 DIAGNOSIS — Z94.0 KIDNEY REPLACED BY TRANSPLANT: ICD-10-CM

## 2024-07-01 DIAGNOSIS — R79.89 ELEVATED SERUM CREATININE: ICD-10-CM

## 2024-07-01 DIAGNOSIS — D64.89 ANEMIA DUE TO OTHER CAUSE, NOT CLASSIFIED: Primary | ICD-10-CM

## 2024-07-01 LAB
ALBUMIN SERPL BCG-MCNC: 3.3 G/DL (ref 3.5–5.2)
ALP SERPL-CCNC: 44 U/L (ref 40–150)
ALT SERPL W P-5'-P-CCNC: 18 U/L (ref 0–70)
ANION GAP SERPL CALCULATED.3IONS-SCNC: 8 MMOL/L (ref 7–15)
AST SERPL W P-5'-P-CCNC: 16 U/L (ref 0–45)
BASOPHILS # BLD AUTO: 0 10E3/UL (ref 0–0.2)
BASOPHILS NFR BLD AUTO: 0 %
BILIRUB SERPL-MCNC: 0.3 MG/DL
BUN SERPL-MCNC: 56.7 MG/DL (ref 8–23)
CALCIUM SERPL-MCNC: 9.4 MG/DL (ref 8.8–10.2)
CHLORIDE SERPL-SCNC: 107 MMOL/L (ref 98–107)
CREAT SERPL-MCNC: 2.37 MG/DL (ref 0.67–1.17)
DEPRECATED HCO3 PLAS-SCNC: 24 MMOL/L (ref 22–29)
EGFRCR SERPLBLD CKD-EPI 2021: 28 ML/MIN/1.73M2
EOSINOPHIL # BLD AUTO: 0 10E3/UL (ref 0–0.7)
EOSINOPHIL NFR BLD AUTO: 0 %
ERYTHROCYTE [DISTWIDTH] IN BLOOD BY AUTOMATED COUNT: 18.5 % (ref 10–15)
GLUCOSE SERPL-MCNC: 193 MG/DL (ref 70–99)
HCT VFR BLD AUTO: 18.4 % (ref 40–53)
HGB BLD-MCNC: 5.4 G/DL (ref 13.3–17.7)
IMM GRANULOCYTES # BLD: 0.1 10E3/UL
IMM GRANULOCYTES NFR BLD: 1 %
LYMPHOCYTES # BLD AUTO: 1.2 10E3/UL (ref 0.8–5.3)
LYMPHOCYTES NFR BLD AUTO: 17 %
MCH RBC QN AUTO: 34.2 PG (ref 26.5–33)
MCHC RBC AUTO-ENTMCNC: 29.3 G/DL (ref 31.5–36.5)
MCV RBC AUTO: 117 FL (ref 78–100)
MONOCYTES # BLD AUTO: 0.5 10E3/UL (ref 0–1.3)
MONOCYTES NFR BLD AUTO: 7 %
NEUTROPHILS # BLD AUTO: 5.2 10E3/UL (ref 1.6–8.3)
NEUTROPHILS NFR BLD AUTO: 74 %
NRBC # BLD AUTO: 0.3 10E3/UL
NRBC BLD AUTO-RTO: 4 /100
PLATELET # BLD AUTO: 129 10E3/UL (ref 150–450)
POTASSIUM SERPL-SCNC: 5.2 MMOL/L (ref 3.4–5.3)
PROT SERPL-MCNC: 5.4 G/DL (ref 6.4–8.3)
RBC # BLD AUTO: 1.58 10E6/UL (ref 4.4–5.9)
SODIUM SERPL-SCNC: 139 MMOL/L (ref 135–145)
WBC # BLD AUTO: 7 10E3/UL (ref 4–11)

## 2024-07-01 PROCEDURE — 80053 COMPREHEN METABOLIC PANEL: CPT

## 2024-07-01 PROCEDURE — 36591 DRAW BLOOD OFF VENOUS DEVICE: CPT

## 2024-07-01 PROCEDURE — 99207 PR NO CHARGE LOS: CPT

## 2024-07-01 PROCEDURE — 85025 COMPLETE CBC W/AUTO DIFF WBC: CPT

## 2024-07-01 PROCEDURE — G0463 HOSPITAL OUTPT CLINIC VISIT: HCPCS | Performed by: NURSE PRACTITIONER

## 2024-07-01 PROCEDURE — 250N000011 HC RX IP 250 OP 636: Performed by: INTERNAL MEDICINE

## 2024-07-01 PROCEDURE — 99215 OFFICE O/P EST HI 40 MIN: CPT | Performed by: NURSE PRACTITIONER

## 2024-07-01 RX ORDER — HEPARIN SODIUM (PORCINE) LOCK FLUSH IV SOLN 100 UNIT/ML 100 UNIT/ML
5 SOLUTION INTRAVENOUS EVERY 8 HOURS
Status: DISCONTINUED | OUTPATIENT
Start: 2024-07-01 | End: 2024-07-01 | Stop reason: HOSPADM

## 2024-07-01 RX ADMIN — Medication 5 ML: at 09:25

## 2024-07-01 ASSESSMENT — PAIN SCALES - GENERAL: PAINLEVEL: NO PAIN (0)

## 2024-07-01 NOTE — LETTER
7/1/2024      Berny Estrada  [address] [facility] [address] [address]  [place] [address]      Dear Colleague,    Thank you for referring your patient, Berny Estrada, to the [facility]. Please see a copy of my visit note below.    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   Berny Estrada is a 74 year old male with medical history including right renal transplant (2014), hypertension, type 2 diabetes with proliferative diabetic retinopathy and long-term insulin use, CKD stage III, CAD, COPD, GERD, colon polyps (tubular adenoma), esophageal candidiasis, cholelithiasis, sleep apnea, hx of tobacco use (quit 50 years ago)-  Presenting complaint of weight loss 10/23-11/23 10-15 lbs, then gain 10 over past few months and Significant ECOG decline over the past 6 months,  using wheelchair and supplemental oxygen  -12/1/23- 12/9/23 hospitalized 2/2 anemia, hgb 7.9 w/melena x few months (was on iron)  - 12/6/23 CEA 11.69, 12/7/23 CEA 10  - 12/14/2023 EGD: Patchy white plaques in proximal esophagus and midesophagus suspicious for candidiasis, otherwise normal  -12/14/2023 [facility]:  - Fungating partially obstructing, partially circumferential (involving two thirds of the lumen circumference) large mass found in the cecum, including IC valve, with oozing   - Adenocarcinoma, moderately differentiated   - Loss of expression of MLH1 and PMS2; intact expression of MSH2 and MSH6   - MLH1 promoter methylation: POSITIVE   - BRAF V600 mutation positive  - 4 sessile polyps in ascending colon, 3-5 mm in size- - Sessile serrated adenoma with focal high-grade dysplasia and Separate minute fragment of adenocarcinoma  - 2 mm sessile polyp in transverse colon- Tubular adenoma; negative for high-grade dysplasia  - 2 sessile polyps in the  ascending colon, 4-5 mm in size- Tubular adenoma; negative for high-grade dysplasia  - 6 sessile polyps in the rectosigmoid colon, 3-8 mm in size- Sessile serrated adenoma with focal cytologic dysplasia. No evidence of high-grade dysplasia or invasive carcinoma  - Medium sized external hemorrhoids  12/21/23 CT CAP without contrast:  -Cardiomegaly, coronary artery stenting calcifications  - Dilated main pulmonary artery, 3.8 cm  - Calcified hilar lymphadenopathy  - RML and lingular bronchiectatic changes with associated fibroatelectasis, may be secondary to chronic infectious/inflammatory process, Ill-defined groundglass opacities predominantly affecting DERIC and lingula, Dependent atelectasis  - Small left-sided pleural effusion  -Atrophic thyroid with ill-defined subcentimeter nodularity  - Cholelithiasis  - Atrophic appearance of native kidneys, right lower quadrant renal transplant with mild hydronephrosis  - 1.6 cm hyperattenuating lesion of left kidney  - Ill-defined soft tissue thickening about cecum, likely corresponding to known cecal mass  - Associated thickening of peritoneal planes in the region  - Enlarged 2.1 x 1.7 cm mesenteric node adjacent to cecum  - Borderline enlarged 0.9 cm perirectal node  IMPRESSION:   1. Cecal mass with enlarged nodes of the right lower quadrant  including a 2.1 x 1.7 cm lymph node, which are suspicious for  metastatic disease. Indeterminate thickening and nodularity of the  peritoneal surfaces near the cecal mass could be due to fluid or  mesenteric spread of disease.  3. Indeterminate 1.6 cm lesion of the left native kidney. Consider  dedicated renal MRI with contrast for further characterization.  4. Right lower quadrant renal transplant with mild hydronephrosis.  5. Ill-defined groundglass opacities of the left upper lobe and  lingula suspicious for an infectious/inflammatory etiology. Additional  bronchiectatic and fibroatelectatic changes of the right middle lobe  and  lingula suggesting a chronic inflammatory process.  6. Small left-sided pleural effusion.  7. Dilated main pulmonary artery as can be seen with pulmonary  arterial hypertension.  8. Cholelithiasis without findings to suggest acute cholecystitis.    -12/21/23 CEA 31.3    Family history:  Brother- colorectal cancer, age 61, s/p surgery, receiving chemotherapy    Regarding kidney transplant  - right kidney transplant 2014  - on tacrolimus and prednisone 10 mg daily (off cellcept since 12/23)  - 12/23 Cr 1.6, GFR 45  Treatment:   3/6/2024 started  infusional 5FU and LV    Interval History: Mr Estrada and wife are seen today for continuation of treatment for his cecal cancer with  infusional 5FU and LV. Pt states he is very tired. Wife shares he had fall 4 days prior and has bruise on back but pt states he has no pain- did not hit head.   He feels he is eating well and pushing fluids but wife shares that he has problems getting his recommended fluids in each day. Bowels are constipated and he is not treating- been 3 days since last BM.- this was dark with no red blood.  Oxygen dependant and not active in the day- showering causing much fatigue.   Taking Tacrolimus with history of kidney transplant- no current fevers or rashes.  Denies chest pain.    Rest of comprehensive and complete ROS is reviewed and is negative.   Past Medical History:   Diagnosis Date     Diabetic retinopathy (H)      GERD (gastroesophageal reflux disease)      Hypertension      Renal disease     renal transplant     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     Sleep apnea     has equipment  not always uses     Type 2 diabetes mellitus without complications (H)      Current Outpatient Medications   Medication Sig Dispense Refill     albuterol (PROAIR HFA/PROVENTIL HFA/VENTOLIN HFA) 108 (90 Base) MCG/ACT inhaler INHALE 2 PUFFS BY INHALATION EVERY 6 HOURS AS NEEDED FOR SHORTNESS OF BREATH, FOR SECRETIONS       apixaban ANTICOAGULANT (ELIQUIS) 5 MG tablet  10 mg       carvedilol (COREG) 12.5 MG tablet Take 12.5 mg by mouth 2 times daily       insulin aspart U-10, diluted conc 10 units/mL, (NOVOLOG) 10 unit/ml injection Inject Subcutaneous 3 times daily (before meals) 10-16 units       INSULIN GLARGINE SC Inject 34 Units Subcutaneous at bedtime 7/11 am       ipratropium - albuterol 0.5 mg/2.5 mg/3 mL (DUONEB) 0.5-2.5 (3) MG/3ML neb solution INHALE 3ML IN NEBULIZER BY INHALATION FOUR TIMES A DAY AS NEEDED FOR SHORTNESS OF BREATH       lidocaine-prilocaine (EMLA) 2.5-2.5 % external cream Apply topically as needed for moderate pain 30 g 1     loratadine (CLARITIN) 10 MG tablet 10 mg       Magnesium Oxide 420 MG TABS Take 1 tablet by mouth 2 times daily       mycophenolate, IDS 3865, (CELLCEPT) 250 MG capsule Take 2 tablets by mouth 2 times daily       OMEPRAZOLE PO Take 1 tablet by mouth daily       ondansetron (ZOFRAN) 8 MG tablet Take 1 tablet (8 mg) by mouth every 8 hours as needed for nausea (vomiting) 30 tablet 2     ondansetron (ZOFRAN) 8 MG tablet Take 1 tablet (8 mg) by mouth every 8 hours as needed for nausea (vomiting) 30 tablet 2     predniSONE (DELTASONE) 10 MG tablet Take 40 mg by mouth daily       prochlorperazine (COMPAZINE) 10 MG tablet Take 1 tablet (10 mg) by mouth every 6 hours as needed for nausea or vomiting 30 tablet 2     prochlorperazine (COMPAZINE) 10 MG tablet Take 1 tablet (10 mg) by mouth every 6 hours as needed for nausea or vomiting 30 tablet 2     sulfamethoxazole-trimethoprim (BACTRIM DS) 800-160 MG tablet Take 1 tablet by mouth three times a week       tacrolimus (PROGRAF-GENERIC EQUIVALENT) 5 MG capsule Take 5 mg by mouth daily       tamsulosin (FLOMAX) 0.4 MG capsule Take 0.4 mg by mouth daily       VITAMIN D, CHOLECALCIFEROL, PO Take 1 tablet by mouth once a week       amLODIPine (NORVASC) 10 MG tablet Take 10 mg by mouth daily (Patient not taking: Reported on 6/17/2024)       No current facility-administered medications for this visit.  "    Facility-Administered Medications Ordered in Other Visits   Medication Dose Route Frequency Provider Last Rate Last Admin     heparin lock flush 100 unit/mL injection 5 mL  5 mL Intracatheter Q8H Stacey Blunt DO   5 mL at 07/01/24 0925     No Known Allergies    Physical Exam:/67 (BP Location: Right arm)   Pulse 93   Temp 98.6  F (37  C)   Ht 1.778 m (5' 10\")   Wt 104.3 kg (230 lb)   SpO2 100%   BMI 33.00 kg/m     Constitutional: NAD, Alert, and cooperative. Comes in wheelchair with wife pushing him, Oxygen on per NC.   ENT: Eyes- sclera clear- conjunctiva pale, No mouth sores  Neck: Supple, No adenopathy.Normal ROM.   Cardiac: Heart rate and rhythm is regular with normal S1 and S2 without murmur  Respiratory: Non labored breathing. Lung sounds clear to auscultation bilaterally  Abdomen: Soft, non-tender, BS noted. No masses or organomegaly  MS: Muscle tone normal, extremities normal with light edema.   Skin: No suspicious lesions or rashes. Bruising to mid back from fall 4 days previous slightly to right of spine.   Neuro: Sensory grossly WNL, gait not observed. A/O x 4.  Lymph: Normal ant/post cervical, axillary, supraclavicular nodes  Psych: Well groomed. Mentation appears normal and affect normal/bright.    The rest of a comprehensive physical examination is deferred due to video visit restrictions     Laboratory/Imaging Results:   Results for orders placed or performed in visit on 07/01/24   Comprehensive metabolic panel     Status: Abnormal   Result Value Ref Range    Sodium 139 135 - 145 mmol/L    Potassium 5.2 3.4 - 5.3 mmol/L    Carbon Dioxide (CO2) 24 22 - 29 mmol/L    Anion Gap 8 7 - 15 mmol/L    Urea Nitrogen 56.7 (H) 8.0 - 23.0 mg/dL    Creatinine 2.37 (H) 0.67 - 1.17 mg/dL    GFR Estimate 28 (L) >60 mL/min/1.73m2    Calcium 9.4 8.8 - 10.2 mg/dL    Chloride 107 98 - 107 mmol/L    Glucose 193 (H) 70 - 99 mg/dL    Alkaline Phosphatase 44 40 - 150 U/L    AST 16 0 - 45 U/L    ALT 18 0 - 70 " U/L    Protein Total 5.4 (L) 6.4 - 8.3 g/dL    Albumin 3.3 (L) 3.5 - 5.2 g/dL    Bilirubin Total 0.3 <=1.2 mg/dL   CBC with platelets and differential     Status: Abnormal   Result Value Ref Range    WBC Count 7.0 4.0 - 11.0 10e3/uL    RBC Count 1.58 (L) 4.40 - 5.90 10e6/uL    Hemoglobin 5.4 (LL) 13.3 - 17.7 g/dL    Hematocrit 18.4 (L) 40.0 - 53.0 %     (H) 78 - 100 fL    MCH 34.2 (H) 26.5 - 33.0 pg    MCHC 29.3 (L) 31.5 - 36.5 g/dL    RDW 18.5 (H) 10.0 - 15.0 %    Platelet Count 129 (L) 150 - 450 10e3/uL    % Neutrophils 74 %    % Lymphocytes 17 %    % Monocytes 7 %    % Eosinophils 0 %    % Basophils 0 %    % Immature Granulocytes 1 %    NRBCs per 100 WBC 4 (H) <1 /100    Absolute Neutrophils 5.2 1.6 - 8.3 10e3/uL    Absolute Lymphocytes 1.2 0.8 - 5.3 10e3/uL    Absolute Monocytes 0.5 0.0 - 1.3 10e3/uL    Absolute Eosinophils 0.0 0.0 - 0.7 10e3/uL    Absolute Basophils 0.0 0.0 - 0.2 10e3/uL    Absolute Immature Granulocytes 0.1 <=0.4 10e3/uL    Absolute NRBCs 0.3 10e3/uL   CBC with platelets differential     Status: Abnormal    Narrative    The following orders were created for panel order CBC with platelets differential.  Procedure                               Abnormality         Status                     ---------                               -----------         ------                     CBC with platelets and d...[[id number]]  Abnormal            Final result                 Please view results for these tests on the individual orders.         Assessment and Plan:   Cecal Adenocarcinoma- Pt started Leucovorin and infusional 5FU on 3/6/2024. He comes today with complaint of fatigue with finding of Hgb of 5.4 with confirming dark stools though constipated- no obvious red blood. [facility] weeks [facility] with possible GI bleed but no progress was made on discovery of the anemia. He has history of iron infusion as well.   [facility] and [facility] for review for GI  bleed and anemia concerns.   Chemotherapy will be deferred again today- last infusion was 5/18/2024.   - suggest need for constipation treatment with daily stool softener start with 1-2 tabs daily and use miralax if no stools in 2 days.   Will see pt back after hospitalization.     Fall- bruise to back as only result noted from fall- no head injury.Probably affected by anemia and fatigue.No continued bruising seen now but pt is on Eliquis for history of DVD in this normally inactive patient- this will need to be addressed and discussed especially with probable GI bleed as well- should Eliquis be continued.     Nutrition- previous weight loss even prior to diagnosis.Has diabetes and kidney transplant- refused dietician visit. WIll monitor weight with each cycle.     Kidney transplant- pt using tacrolimus and working closely with transplant team- aware of his cancer diagnosis. Using amlodipine and carvedilol for help with BPs. Creatinine is elevated today- made ED aware. Pt admits he is not taking fluids well.     DM Type 2 - admits blood sugars are not well controlled. States they will be contacting endocrinology this week. Insulin dependant. Shared that BSs cause short term increase with steroids given with plan.   The total time of this encounter amounted to 45 minutes. This time included face to face time spent with the patient, prep work, ordering tests, and performing post visit documentation.  Anna Bonilla Cnp       Again, thank you for allowing me to participate in the care of your patient.        Sincerely,        Anna Bonilla NP, APRN CNP

## 2024-07-01 NOTE — NURSING NOTE
"Oncology Rooming Note    July 1, 2024 9:55 AM   Berny Estrada is a 74 year old male who presents for:    Chief Complaint   Patient presents with    Oncology Clinic Visit     Initial Vitals: /67 (BP Location: Right arm)   Pulse 93   Temp 98.6  F (37  C)   Ht 1.778 m (5' 10\")   Wt 104.3 kg (230 lb)   SpO2 100%   BMI 33.00 kg/m   Estimated body mass index is 33 kg/m  as calculated from the following:    Height as of this encounter: 1.778 m (5' 10\").    Weight as of this encounter: 104.3 kg (230 lb). Body surface area is 2.27 meters squared.  No Pain (0) Comment: Data Unavailable   No LMP for male patient.  Allergies reviewed: Yes  Medications reviewed: Yes    Medications: Medication refills not needed today.  Pharmacy name entered into EPIC: [facility] - [place], [place] - [address]    Frailty Screening:   Is the patient here for a new oncology consult visit in cancer care? 2. No      Clinical concerns: No Concerns        Maar Ferris MA            "

## 2024-07-01 NOTE — PROGRESS NOTES
Port accessed with no incidient. Site cleaned with chloraprep for 30 seconds. Site dry and accessed with appropriate port needle. Sterile dressing applied. Blood return noted and lab tubes drawn. Port flushed with saline and heparin. Patient tolerated port draw well.   Sravanthi Burnett RN

## 2024-07-01 NOTE — PROGRESS NOTES
Infusion Nursing Note:  Berny Estrada presents today for C6D1 Leucovorin and 5FU Home Pump Connect***.    Patient seen by provider today: Yes: Anna Bonilla NP   present during visit today: Not Applicable.    Note: {Not Applicable or free text:170060:s}.  *** O2, black stools, lung and leg clot    Patient *** states patient is taking Decadron as prescribed.    *** DEXTROSE, zof, leuco 15, pump    Intravenous Access:  Implanted Port.    Treatment Conditions:  ***1.2 75  {UMHTXCONDITIONS:670288}      Post Infusion Assessment:  Patient tolerated infusion {tolerate:733572}  Blood return noted pre and post infusion.  Site patent and intact, free from redness, edema or discomfort.  No evidence of extravasations.  ***.       Discharge Plan:   AVS to patient via MYCHART.  Patient will return *** for next appointment.   Patient discharged in stable condition accompanied by: {umhindividuals:371676}.  Departure Mode: {umeparture:244338}.      Mini Barrera RN

## 2024-07-01 NOTE — Clinical Note
[facility] H[facility]5.4 for GI work up  Noting Creat up today as well in factor of kidney transplant. Anna

## 2024-07-01 NOTE — PATIENT INSTRUCTIONS
Results for orders placed or performed in visit on 07/01/24   Comprehensive metabolic panel     Status: Abnormal   Result Value Ref Range    Sodium 139 135 - 145 mmol/L    Potassium 5.2 3.4 - 5.3 mmol/L    Carbon Dioxide (CO2) 24 22 - 29 mmol/L    Anion Gap 8 7 - 15 mmol/L    Urea Nitrogen 56.7 (H) 8.0 - 23.0 mg/dL    Creatinine 2.37 (H) 0.67 - 1.17 mg/dL    GFR Estimate 28 (L) >60 mL/min/1.73m2    Calcium 9.4 8.8 - 10.2 mg/dL    Chloride 107 98 - 107 mmol/L    Glucose 193 (H) 70 - 99 mg/dL    Alkaline Phosphatase 44 40 - 150 U/L    AST 16 0 - 45 U/L    ALT 18 0 - 70 U/L    Protein Total 5.4 (L) 6.4 - 8.3 g/dL    Albumin 3.3 (L) 3.5 - 5.2 g/dL    Bilirubin Total 0.3 <=1.2 mg/dL   CBC with platelets and differential     Status: Abnormal   Result Value Ref Range    WBC Count 7.0 4.0 - 11.0 10e3/uL    RBC Count 1.58 (L) 4.40 - 5.90 10e6/uL    Hemoglobin 5.4 (LL) 13.3 - 17.7 g/dL    Hematocrit 18.4 (L) 40.0 - 53.0 %     (H) 78 - 100 fL    MCH 34.2 (H) 26.5 - 33.0 pg    MCHC 29.3 (L) 31.5 - 36.5 g/dL    RDW 18.5 (H) 10.0 - 15.0 %    Platelet Count 129 (L) 150 - 450 10e3/uL    % Neutrophils 74 %    % Lymphocytes 17 %    % Monocytes 7 %    % Eosinophils 0 %    % Basophils 0 %    % Immature Granulocytes 1 %    NRBCs per 100 WBC 4 (H) <1 /100    Absolute Neutrophils 5.2 1.6 - 8.3 10e3/uL    Absolute Lymphocytes 1.2 0.8 - 5.3 10e3/uL    Absolute Monocytes 0.5 0.0 - 1.3 10e3/uL    Absolute Eosinophils 0.0 0.0 - 0.7 10e3/uL    Absolute Basophils 0.0 0.0 - 0.2 10e3/uL    Absolute Immature Granulocytes 0.1 <=0.4 10e3/uL    Absolute NRBCs 0.3 10e3/uL   CBC with platelets differential     Status: Abnormal    Narrative    The following orders were created for panel order CBC with platelets differential.  Procedure                               Abnormality         Status                     ---------                               -----------         ------                     CBC with platelets and d...[[id number]]  Abnormal             Final result                 Please view results for these tests on the individual orders.

## 2024-07-02 ENCOUNTER — TRANSFERRED RECORDS (OUTPATIENT)
Dept: HEALTH INFORMATION MANAGEMENT | Facility: CLINIC | Age: 74
End: 2024-07-02

## 2024-07-03 ENCOUNTER — TRANSFERRED RECORDS (OUTPATIENT)
Dept: HEALTH INFORMATION MANAGEMENT | Facility: CLINIC | Age: 74
End: 2024-07-03
Payer: MEDICARE

## 2024-07-08 ENCOUNTER — TRANSFERRED RECORDS (OUTPATIENT)
Dept: HEALTH INFORMATION MANAGEMENT | Facility: CLINIC | Age: 74
End: 2024-07-08
Payer: MEDICARE

## 2024-07-09 LAB
ALT SERPL-CCNC: 15 U/L
AST SERPL-CCNC: 17 U/L (ref 11–34)
CREATININE (EXTERNAL): 1.9 MG/DL (ref 0.7–1.2)
GFR ESTIMATED (EXTERNAL): 37 ML/MIN/1.73M2
GLUCOSE (EXTERNAL): 126 MG/DL (ref 70–100)
POTASSIUM (EXTERNAL): 5.3 MMOL/L (ref 3.5–5.1)

## 2024-07-10 ENCOUNTER — TRANSFERRED RECORDS (OUTPATIENT)
Dept: HEALTH INFORMATION MANAGEMENT | Facility: CLINIC | Age: 74
End: 2024-07-10
Payer: MEDICARE

## 2024-07-11 ENCOUNTER — TRANSFERRED RECORDS (OUTPATIENT)
Dept: HEALTH INFORMATION MANAGEMENT | Facility: CLINIC | Age: 74
End: 2024-07-11
Payer: MEDICARE

## 2024-07-11 ENCOUNTER — TRANSCRIBE ORDERS (OUTPATIENT)
Dept: OTHER | Age: 74
End: 2024-07-11

## 2024-07-11 DIAGNOSIS — C18.9 MALIGNANT NEOPLASM OF COLON, UNSPECIFIED (H): Primary | ICD-10-CM

## 2024-07-12 ENCOUNTER — TRANSFERRED RECORDS (OUTPATIENT)
Dept: HEALTH INFORMATION MANAGEMENT | Facility: CLINIC | Age: 74
End: 2024-07-12

## 2024-07-15 ENCOUNTER — LAB (OUTPATIENT)
Dept: INFUSION THERAPY | Facility: CLINIC | Age: 74
End: 2024-07-15
Attending: INTERNAL MEDICINE
Payer: MEDICARE

## 2024-07-15 ENCOUNTER — VIRTUAL VISIT (OUTPATIENT)
Dept: ONCOLOGY | Facility: CLINIC | Age: 74
End: 2024-07-15
Attending: PHYSICIAN ASSISTANT
Payer: MEDICARE

## 2024-07-15 VITALS
BODY MASS INDEX: 32.82 KG/M2 | OXYGEN SATURATION: 97 % | RESPIRATION RATE: 16 BRPM | TEMPERATURE: 98.4 F | SYSTOLIC BLOOD PRESSURE: 133 MMHG | DIASTOLIC BLOOD PRESSURE: 76 MMHG | WEIGHT: 228.7 LBS | HEART RATE: 87 BPM

## 2024-07-15 VITALS — BODY MASS INDEX: 31.92 KG/M2 | HEIGHT: 70 IN | WEIGHT: 223 LBS

## 2024-07-15 DIAGNOSIS — C18.0 MALIGNANT NEOPLASM OF CECUM (H): Primary | ICD-10-CM

## 2024-07-15 DIAGNOSIS — D64.89 ANEMIA DUE TO OTHER CAUSE, NOT CLASSIFIED: ICD-10-CM

## 2024-07-15 LAB
ALBUMIN SERPL BCG-MCNC: 3.3 G/DL (ref 3.5–5.2)
ALP SERPL-CCNC: 53 U/L (ref 40–150)
ALT SERPL W P-5'-P-CCNC: 19 U/L (ref 0–70)
ANION GAP SERPL CALCULATED.3IONS-SCNC: 7 MMOL/L (ref 7–15)
AST SERPL W P-5'-P-CCNC: 15 U/L (ref 0–45)
BASOPHILS # BLD AUTO: 0 10E3/UL (ref 0–0.2)
BASOPHILS NFR BLD AUTO: 0 %
BILIRUB SERPL-MCNC: 0.3 MG/DL
BUN SERPL-MCNC: 33.2 MG/DL (ref 8–23)
CALCIUM SERPL-MCNC: 9.4 MG/DL (ref 8.8–10.2)
CHLORIDE SERPL-SCNC: 105 MMOL/L (ref 98–107)
CREAT SERPL-MCNC: 2.65 MG/DL (ref 0.67–1.17)
EGFRCR SERPLBLD CKD-EPI 2021: 25 ML/MIN/1.73M2
EOSINOPHIL # BLD AUTO: 0.1 10E3/UL (ref 0–0.7)
EOSINOPHIL NFR BLD AUTO: 1 %
ERYTHROCYTE [DISTWIDTH] IN BLOOD BY AUTOMATED COUNT: 19.3 % (ref 10–15)
GLUCOSE SERPL-MCNC: 216 MG/DL (ref 70–99)
HCO3 SERPL-SCNC: 27 MMOL/L (ref 22–29)
HCT VFR BLD AUTO: 26.6 % (ref 40–53)
HGB BLD-MCNC: 8.2 G/DL (ref 13.3–17.7)
IMM GRANULOCYTES # BLD: 0.1 10E3/UL
IMM GRANULOCYTES NFR BLD: 1 %
LYMPHOCYTES # BLD AUTO: 1.1 10E3/UL (ref 0.8–5.3)
LYMPHOCYTES NFR BLD AUTO: 13 %
MCH RBC QN AUTO: 33.1 PG (ref 26.5–33)
MCHC RBC AUTO-ENTMCNC: 30.8 G/DL (ref 31.5–36.5)
MCV RBC AUTO: 107 FL (ref 78–100)
MONOCYTES # BLD AUTO: 0.6 10E3/UL (ref 0–1.3)
MONOCYTES NFR BLD AUTO: 7 %
NEUTROPHILS # BLD AUTO: 6.5 10E3/UL (ref 1.6–8.3)
NEUTROPHILS NFR BLD AUTO: 78 %
NRBC # BLD AUTO: 0.1 10E3/UL
NRBC BLD AUTO-RTO: 1 /100
PLATELET # BLD AUTO: 145 10E3/UL (ref 150–450)
POTASSIUM SERPL-SCNC: 5.4 MMOL/L (ref 3.4–5.3)
PROT SERPL-MCNC: 5.4 G/DL (ref 6.4–8.3)
RBC # BLD AUTO: 2.48 10E6/UL (ref 4.4–5.9)
SODIUM SERPL-SCNC: 139 MMOL/L (ref 135–145)
WBC # BLD AUTO: 8.3 10E3/UL (ref 4–11)

## 2024-07-15 PROCEDURE — 82040 ASSAY OF SERUM ALBUMIN: CPT

## 2024-07-15 PROCEDURE — 250N000011 HC RX IP 250 OP 636: Performed by: INTERNAL MEDICINE

## 2024-07-15 PROCEDURE — G0498 CHEMO EXTEND IV INFUS W/PUMP: HCPCS

## 2024-07-15 PROCEDURE — 96365 THER/PROPH/DIAG IV INF INIT: CPT | Mod: XU

## 2024-07-15 PROCEDURE — 99207 PR NO CHARGE LOS: CPT

## 2024-07-15 PROCEDURE — 99213 OFFICE O/P EST LOW 20 MIN: CPT | Mod: 95 | Performed by: PHYSICIAN ASSISTANT

## 2024-07-15 PROCEDURE — 96375 TX/PRO/DX INJ NEW DRUG ADDON: CPT

## 2024-07-15 PROCEDURE — 258N000003 HC RX IP 258 OP 636: Performed by: INTERNAL MEDICINE

## 2024-07-15 PROCEDURE — 36591 DRAW BLOOD OFF VENOUS DEVICE: CPT

## 2024-07-15 PROCEDURE — 85041 AUTOMATED RBC COUNT: CPT

## 2024-07-15 RX ORDER — HEPARIN SODIUM (PORCINE) LOCK FLUSH IV SOLN 100 UNIT/ML 100 UNIT/ML
500 SOLUTION INTRAVENOUS
Status: DISCONTINUED | OUTPATIENT
Start: 2024-07-15 | End: 2024-07-15 | Stop reason: HOSPADM

## 2024-07-15 RX ORDER — ONDANSETRON 2 MG/ML
8 INJECTION INTRAMUSCULAR; INTRAVENOUS ONCE
Status: COMPLETED | OUTPATIENT
Start: 2024-07-15 | End: 2024-07-15

## 2024-07-15 RX ADMIN — LEUCOVORIN CALCIUM 800 MG: 350 INJECTION, POWDER, LYOPHILIZED, FOR SUSPENSION INTRAMUSCULAR; INTRAVENOUS at 11:44

## 2024-07-15 RX ADMIN — ONDANSETRON 8 MG: 2 INJECTION INTRAMUSCULAR; INTRAVENOUS at 11:34

## 2024-07-15 RX ADMIN — Medication 500 UNITS: at 11:09

## 2024-07-15 RX ADMIN — DEXTROSE MONOHYDRATE 100 ML: 5 INJECTION INTRAVENOUS at 11:32

## 2024-07-15 ASSESSMENT — PAIN SCALES - GENERAL
PAINLEVEL: NO PAIN (0)
PAINLEVEL: NO PAIN (0)

## 2024-07-15 NOTE — NURSING NOTE
Current patient location: [address] [place] [address] [address] [address]  [facility] [address]    Is the patient currently in the state of [place]? YES    Visit mode:VIDEO    If the visit is dropped, the patient can be reconnected by: VIDEO VISIT: Send to e-mail at: [email or web address]    Will anyone else be joining the visit? Sharif's Wife  (If patient encounters technical issues they should call [phone])    How would you like to obtain your AVS? MyChart    Are changes needed to the allergy or medication list? No    Are refills needed on medications prescribed by this physician? NO    Reason for visit: JENELLE PALMA

## 2024-07-15 NOTE — PROGRESS NOTES
"Infusion Nursing Note:  Berny Estrada presents today for Leucovorin and 5FU pump connect.    Patient seen by provider today: Yes: Brigid Thompson CNP   present during visit today: Not Applicable.    Note: The patient reports he was discharged from the hospital yesterday. He notes he feel really well today. His energy is increased.     Writer received message from Layla GROSS RN with [facility] that IV fluids are not covered for patient at home. Reviewed with Brigid Thompson CNP that patient has not been receiving fluids on disconnect day. She is advising the patient receive IVF due to increased Crt. Patient declining IVF with his pump disconnect and will be disconnected at home. The patients wife states she will ensure the patient increases his oral fluids. Brigid Thompson CNP updated on the refusal of IVF. This info was relayed to [facility] also and the patient will be disconnected at home without IV fluids.     Intravenous Access:  Implanted Port.    Treatment Conditions:  Lab Results   Component Value Date    HGB 8.2 (L) 07/15/2024    WBC 8.3 07/15/2024    ANEUTAUTO 6.5 07/15/2024     (L) 07/15/2024        Lab Results   Component Value Date     07/15/2024    POTASSIUM 5.4 (H) 07/15/2024    CR 2.65 (H) 07/15/2024    VIVI 9.4 07/15/2024    BILITOTAL 0.3 07/15/2024    ALBUMIN 3.3 (L) 07/15/2024    ALT 19 07/15/2024    AST 15 07/15/2024       Results reviewed, labs MET treatment parameters, ok to proceed with treatment.      Post Infusion Assessment:  Patient tolerated infusion without incident.  Blood return noted pre and post infusion.  Site patent and intact, free from redness, edema or discomfort.  No evidence of extravasations.     Prior to discharge: Port is secured in place with tegaderm and flushed with 10cc NS with positive blood return noted.    Continuous home infusion Dosi-Fuser pump connected.  All connectors secured in place and clamps taped open.    Pump started, \"running\" noted on display " (CADD): Not Applicable.   Capillary element taped to pt's skin per protocol.  Pump Connection double checked with Glenys MORSE RN.  Patient instructed to call our clinic or [facility] with any questions or concerns at home.  Patient verbalized understanding.    Patient set up for pump disconnect at home with [facility] on 7/17/24 at 1030 am. Message sent to Layla GROSS RN with [facility] to confirm appt.          Discharge Plan:   AVS to patient via MYCHART.  Patient will return 7/26/24 for next appointment. Future appts have been reviewed and crosschecked with appt note and plan.   Patient discharged in stable condition accompanied by: self.  Departure Mode: Ambulatory.      Jennifer Bradford RN

## 2024-07-15 NOTE — LETTER
7/15/2024      Berny Estrada  [address] [address]  [place] [address]      Dear Colleague,    Thank you for referring your patient, Berny Estrada, to the [facility]. Please see a copy of my visit note below.    Virtual Visit Details    Type of service:  Video Visit   Video Start Time: 8:42 AM  Video End Time:8:55 AM    Originating Location (pt. Location): Home    Distant Location (provider location):  On-site  Platform used for Video Visit: [facility]      Oncology/Hematology Visit Note    Jul 15, 2024    Reason for visit: Follow up cecal adenocarcinoma    Oncology HPI: Berny Estrada is a 74 year old male with medical history including right renal transplant (2014), hypertension, type 2 diabetes with proliferative diabetic retinopathy and long-term insulin use, CKD stage III, CAD, COPD, GERD, colon polyps (tubular adenoma), esophageal candidiasis, cholelithiasis, sleep apnea, hx of tobacco use (quit 50 years ago)- Presenting complaint of weight loss 10/23-11/23 10-15 lbs, then gain 10 over past few months and Significant ECOG decline over the 6 months,  using wheelchair and supplemental oxygen  -12/1/23- 12/9/23 hospitalized 2/2 anemia, hgb 7.9 w/melena x few months (was on iron)  - 12/6/23 CEA 11.69, 12/7/23 CEA 10  - 12/14/2023 EGD: Patchy white plaques in proximal esophagus and midesophagus suspicious for candidiasis, otherwise normal  -12[facility]14/2023 [facility]:  - Fungating partially obstructing, partially circumferential (involving two thirds of the lumen circumference) large mass found in the cecum, including IC valve, with oozing              - Adenocarcinoma, moderately differentiated         - Loss of expression of MLH1 and PMS2; intact expression of MSH2 and MSH6   - MLH1 promoter methylation: POSITIVE   - BRAF V600 mutation positive  - 4 sessile polyps in ascending colon, 3-5 mm in size- - Sessile  serrated adenoma with focal high-grade dysplasia and Separate minute fragment of adenocarcinoma  - 2 mm sessile polyp in transverse colon- Tubular adenoma; negative for high-grade dysplasia  - 2 sessile polyps in the ascending colon, 4-5 mm in size- Tubular adenoma; negative for high-grade dysplasia  - 6 sessile polyps in the rectosigmoid colon, 3-8 mm in size- Sessile serrated adenoma with focal cytologic dysplasia. No evidence of high-grade dysplasia or invasive carcinoma  - Medium sized external hemorrhoids  12/21/23 CT CAP without contrast:  -Cardiomegaly, coronary artery stenting calcifications  - Dilated main pulmonary artery, 3.8 cm  - Calcified hilar lymphadenopathy  - RML and lingular bronchiectatic changes with associated fibroatelectasis, may be secondary to chronic infectious/inflammatory process, Ill-defined groundglass opacities predominantly affecting DERIC and lingula, Dependent atelectasis  - Small left-sided pleural effusion  -Atrophic thyroid with ill-defined subcentimeter nodularity  - Cholelithiasis  - Atrophic appearance of native kidneys, right lower quadrant renal transplant with mild hydronephrosis  - 1.6 cm hyperattenuating lesion of left kidney  - Ill-defined soft tissue thickening about cecum, likely corresponding to known cecal mass  - Associated thickening of peritoneal planes in the region  - Enlarged 2.1 x 1.7 cm mesenteric node adjacent to cecum  - Borderline enlarged 0.9 cm perirectal node  IMPRESSION:   1. Cecal mass with enlarged nodes of the right lower quadrant  including a 2.1 x 1.7 cm lymph node, which are suspicious for  metastatic disease. Indeterminate thickening and nodularity of the  peritoneal surfaces near the cecal mass could be due to fluid or  mesenteric spread of disease.  3. Indeterminate 1.6 cm lesion of the left native kidney. Consider  dedicated renal MRI with contrast for further characterization.  4. Right lower quadrant renal transplant with mild  hydronephrosis.  5. Ill-defined groundglass opacities of the left upper lobe and  lingula suspicious for an infectious/inflammatory etiology. Additional  bronchiectatic and fibroatelectatic changes of the right middle lobe  and lingula suggesting a chronic inflammatory process.  6. Small left-sided pleural effusion.  7. Dilated main pulmonary artery as can be seen with pulmonary  arterial hypertension.  8. Cholelithiasis without findings to suggest acute cholecystitis.     -12/21/23 CEA 31.3     Family history:  Brother- colorectal cancer, age 61, s/p surgery, receiving chemotherapy     Regarding kidney transplant  - right kidney transplant 2014  - on tacrolimus and prednisone 10 mg daily (off cellcept since 12/23)  - 12/23 Cr 1.6, GFR 45  Treatment:   3/6/2024 started  infusional 5FU and LV    [facility] of 5.4, given transfusion and EGD negative for bleed, but unable to review records.  He was admitted again on 7/8/2024 with hemoglobin of 6.  [facility] Blunt [facility] and spoke to his nephrologist and oncologist and it was determined that [facility] would [facility], but they would like 1 more infusion with [place] to ensure he continues his regimen.    Video visit today for follow-up and 5-FU/leucovorin.    Interval History: Berny was on video today and his wife was present, but not seen on video, I could still hear her talking and participating in the call.  [facility] and received [facility] units of blood, he is feeling better.  He continues to have intermittent blood in the stool, but this is unchanged. He is ready to continue chemotherapy.  No other new concerns.    Review of Systems: See interval hx. Denies fevers, chills, new HA, , changes in vision, new cough, abdominal pain, N/V, diarrhea, changes in urination, bruising, rash.     PMHx and Social Hx reviewed per EPIC.      Medications:  Current Outpatient Medications   Medication Sig  Dispense Refill     albuterol (PROAIR HFA/PROVENTIL HFA/VENTOLIN HFA) 108 (90 Base) MCG/ACT inhaler INHALE 2 PUFFS BY INHALATION EVERY 6 HOURS AS NEEDED FOR SHORTNESS OF BREATH, FOR SECRETIONS       apixaban ANTICOAGULANT (ELIQUIS) 5 MG tablet 10 mg       carvedilol (COREG) 12.5 MG tablet Take 12.5 mg by mouth 2 times daily       insulin aspart U-10, diluted conc 10 units/mL, (NOVOLOG) 10 unit/ml injection Inject Subcutaneous 3 times daily (before meals) 10-16 units       loratadine (CLARITIN) 10 MG tablet 10 mg       Magnesium Oxide 420 MG TABS Take 1 tablet by mouth 2 times daily       OMEPRAZOLE PO Take 1 tablet by mouth daily       ondansetron (ZOFRAN) 8 MG tablet Take 1 tablet (8 mg) by mouth every 8 hours as needed for nausea (vomiting) 30 tablet 2     prochlorperazine (COMPAZINE) 10 MG tablet Take 1 tablet (10 mg) by mouth every 6 hours as needed for nausea or vomiting 30 tablet 2     sulfamethoxazole-trimethoprim (BACTRIM DS) 800-160 MG tablet Take 1 tablet by mouth three times a week       tamsulosin (FLOMAX) 0.4 MG capsule Take 0.4 mg by mouth daily       VITAMIN D, CHOLECALCIFEROL, PO Take 1 tablet by mouth once a week       amLODIPine (NORVASC) 10 MG tablet Take 10 mg by mouth daily (Patient not taking: Reported on 6/17/2024)       INSULIN GLARGINE SC Inject 34 Units Subcutaneous at bedtime 7/11 am       ipratropium - albuterol 0.5 mg/2.5 mg/3 mL (DUONEB) 0.5-2.5 (3) MG/3ML neb solution INHALE 3ML IN NEBULIZER BY INHALATION FOUR TIMES A DAY AS NEEDED FOR SHORTNESS OF BREATH       lidocaine-prilocaine (EMLA) 2.5-2.5 % external cream Apply topically as needed for moderate pain 30 g 1     mycophenolate, IDS 3865, (CELLCEPT) 250 MG capsule Take 2 tablets by mouth 2 times daily       ondansetron (ZOFRAN) 8 MG tablet Take 1 tablet (8 mg) by mouth every 8 hours as needed for nausea (vomiting) 30 tablet 2     predniSONE (DELTASONE) 10 MG tablet Take 40 mg by mouth daily       prochlorperazine (COMPAZINE) 10 MG  "tablet Take 1 tablet (10 mg) by mouth every 6 hours as needed for nausea or vomiting 30 tablet 2     tacrolimus (PROGRAF-GENERIC EQUIVALENT) 5 MG capsule Take 5 mg by mouth daily         No Known Allergies      EXAM:    Ht 1.778 m (5' 10\")   Wt 101.2 kg (223 lb)   BMI 32.00 kg/m   Video, no VS.     GENERAL:  Male, in no acute distress.  Alert and oriented x3. Well groomed.   HEENT:  Normocephalic, atraumatic. No conjunctival injection or eye swelling.   LUNGS:  Nonlabored breathing, no cough or audible wheezing, able to speak full sentences.  MSK: Full ROM UE.    SKIN: No rash on exposed skin.   NEURO: CN grossly intact, speech normal  PSYCH: Mentation appears normal, insight and judgement intact      Labs: PENDING    Imaging: n/a    Impression/Plan: Berny Estrada is a 74 year old male with cecal adenocarcinoma currently on 5-FU/leucovorin.    Cecal adenocarcinoma: Severe anemia led to the diagnosis.  5-FU/leucovorin C1 D1 on 3/6/2024 and continues to have some intermittent hematochezia.  He has completed 5 full cycles.  Now s/p 3 units PRBCs [facility], feeling better.  I attempted to access records, but unable to view anything.  With Dr. Blunt's departure next month, patient will be transferring all of his [facility], but he was due for chemotherapy, which she will get later today, pending lab results.  Will be transferring all of his care to the [facility] after this cycle.  Pending lab results, okay to proceed with 5-FU/leucovorin cycle 6 today.  -Plan to [facility] going forward    Kidney transplant: Currently on tacrolimus and following the transplant team closely.  Labs are pending.    Chart documentation with Dragon Voice recognition Software. Although reviewed after completion, some words and grammatical errors may remain.    25 minutes spent on the date of the encounter doing chart review, review of outside records, review of test results, interpretation of " tests, patient visit, documentation, and discussion with family     Brigid Thompson PA-C  Hematology/Oncology  [facility]                  Again, thank you for allowing me to participate in the care of your patient.        Sincerely,        Brigid Thompson PA-C

## 2024-07-15 NOTE — LETTER
7/15/2024      Berny sEtrada  [address] [address]  [place] [address]      Dear Colleague,    Thank you for referring your patient, Berny Estrada, to the [facility]. Please see a copy of my visit note below.    Virtual Visit Details    Type of service:  Video Visit   Video Start Time: 8:42 AM  Video End Time:8:55 AM    Originating Location (pt. Location): Home    Distant Location (provider location):  On-site  Platform used for Video Visit: [facility]      Oncology/Hematology Visit Note    Jul 15, 2024    Reason for visit: Follow up cecal adenocarcinoma    Oncology HPI: Berny Estrada is a 74 year old male with medical history including right renal transplant (2014), hypertension, type 2 diabetes with proliferative diabetic retinopathy and long-term insulin use, CKD stage III, CAD, COPD, GERD, colon polyps (tubular adenoma), esophageal candidiasis, cholelithiasis, sleep apnea, hx of tobacco use (quit 50 years ago)- Presenting complaint of weight loss 10/23-11/23 10-15 lbs, then gain 10 over past few months and Significant ECOG decline over the 6 months,  using wheelchair and supplemental oxygen  -12/1/23- 12/9/23 hospitalized 2/2 anemia, hgb 7.9 w/melena x few months (was on iron)  - 12/6/23 CEA 11.69, 12/7/23 CEA 10  - 12/14/2023 EGD: Patchy white plaques in proximal esophagus and midesophagus suspicious for candidiasis, otherwise normal  -12/14/2023 diagnostic colonoscopy [facility]:  - Fungating partially obstructing, partially circumferential (involving two thirds of the lumen circumference) large mass found in the cecum, including IC valve, with oozing              - Adenocarcinoma, moderately differentiated         - Loss of expression of MLH1 and PMS2; intact expression of MSH2 and MSH6   - MLH1 promoter methylation: POSITIVE   - BRAF V600 mutation positive  - 4 sessile polyps in ascending colon, 3-5 mm in size- - Sessile  serrated adenoma with focal high-grade dysplasia and Separate minute fragment of adenocarcinoma  - 2 mm sessile polyp in transverse colon- Tubular adenoma; negative for high-grade dysplasia  - 2 sessile polyps in the ascending colon, 4-5 mm in size- Tubular adenoma; negative for high-grade dysplasia  - 6 sessile polyps in the rectosigmoid colon, 3-8 mm in size- Sessile serrated adenoma with focal cytologic dysplasia. No evidence of high-grade dysplasia or invasive carcinoma  - Medium sized external hemorrhoids  12/21/23 CT CAP without contrast:  -Cardiomegaly, coronary artery stenting calcifications  - Dilated main pulmonary artery, 3.8 cm  - Calcified hilar lymphadenopathy  - RML and lingular bronchiectatic changes with associated fibroatelectasis, may be secondary to chronic infectious/inflammatory process, Ill-defined groundglass opacities predominantly affecting DERIC and lingula, Dependent atelectasis  - Small left-sided pleural effusion  -Atrophic thyroid with ill-defined subcentimeter nodularity  - Cholelithiasis  - Atrophic appearance of native kidneys, right lower quadrant renal transplant with mild hydronephrosis  - 1.6 cm hyperattenuating lesion of left kidney  - Ill-defined soft tissue thickening about cecum, likely corresponding to known cecal mass  - Associated thickening of peritoneal planes in the region  - Enlarged 2.1 x 1.7 cm mesenteric node adjacent to cecum  - Borderline enlarged 0.9 cm perirectal node  IMPRESSION:   1. Cecal mass with enlarged nodes of the right lower quadrant  including a 2.1 x 1.7 cm lymph node, which are suspicious for  metastatic disease. Indeterminate thickening and nodularity of the  peritoneal surfaces near the cecal mass could be due to fluid or  mesenteric spread of disease.  3. Indeterminate 1.6 cm lesion of the left native kidney. Consider  dedicated renal MRI with contrast for further characterization.  4. Right lower quadrant renal transplant with mild  hydronephrosis.  5. Ill-defined groundglass opacities of the left upper lobe and  lingula suspicious for an infectious/inflammatory etiology. Additional  bronchiectatic and fibroatelectatic changes of the right middle lobe  and lingula suggesting a chronic inflammatory process.  6. Small left-sided pleural effusion.  7. Dilated main pulmonary artery as can be seen with pulmonary  arterial hypertension.  8. Cholelithiasis without findings to suggest acute cholecystitis.     -12/21/23 CEA 31.3     Family history:  Brother- colorectal cancer, age 61, s/p surgery, receiving chemotherapy     Regarding kidney transplant  - right kidney transplant 2014  - on tacrolimus and prednisone 10 mg daily (off cellcept since 12/23)  - 12/23 Cr 1.6, GFR 45  Treatment:   3/6/2024 started  infusional 5FU and LV    [facility] with hemoglobin of 5.4, given transfusion and EGD negative for bleed, but unable to review records.  He was admitted again on 7/8/2024 with hemoglobin of 6.  Dr[facility] Blunt [facility] and spoke to his nephrologist and oncologist and it was determined that he would [facility], but they would like 1 more infusion with [place] to ensure he continues his regimen.    Video visit today for follow-up and 5-FU/leucovorin.    Interval History: Berny was on video today and his wife was present, but not seen on video, I could still hear her talking and participating in the call.  [facility] and received 3 units of blood, he is feeling better.  He continues to have intermittent blood in the stool, but this is unchanged. He is ready to continue chemotherapy.  No other new concerns.    Review of Systems: See interval hx. Denies fevers, chills, new HA, , changes in vision, new cough, abdominal pain, N/V, diarrhea, changes in urination, bruising, rash.     PMHx and Social Hx reviewed per EPIC.      Medications:  Current Outpatient Medications   Medication Sig  Dispense Refill     albuterol (PROAIR HFA/PROVENTIL HFA/VENTOLIN HFA) 108 (90 Base) MCG/ACT inhaler INHALE 2 PUFFS BY INHALATION EVERY 6 HOURS AS NEEDED FOR SHORTNESS OF BREATH, FOR SECRETIONS       apixaban ANTICOAGULANT (ELIQUIS) 5 MG tablet 10 mg       carvedilol (COREG) 12.5 MG tablet Take 12.5 mg by mouth 2 times daily       insulin aspart U-10, diluted conc 10 units/mL, (NOVOLOG) 10 unit/ml injection Inject Subcutaneous 3 times daily (before meals) 10-16 units       loratadine (CLARITIN) 10 MG tablet 10 mg       Magnesium Oxide 420 MG TABS Take 1 tablet by mouth 2 times daily       OMEPRAZOLE PO Take 1 tablet by mouth daily       ondansetron (ZOFRAN) 8 MG tablet Take 1 tablet (8 mg) by mouth every 8 hours as needed for nausea (vomiting) 30 tablet 2     prochlorperazine (COMPAZINE) 10 MG tablet Take 1 tablet (10 mg) by mouth every 6 hours as needed for nausea or vomiting 30 tablet 2     sulfamethoxazole-trimethoprim (BACTRIM DS) 800-160 MG tablet Take 1 tablet by mouth three times a week       tamsulosin (FLOMAX) 0.4 MG capsule Take 0.4 mg by mouth daily       VITAMIN D, CHOLECALCIFEROL, PO Take 1 tablet by mouth once a week       amLODIPine (NORVASC) 10 MG tablet Take 10 mg by mouth daily (Patient not taking: Reported on 6/17/2024)       INSULIN GLARGINE SC Inject 34 Units Subcutaneous at bedtime 7/11 am       ipratropium - albuterol 0.5 mg/2.5 mg/3 mL (DUONEB) 0.5-2.5 (3) MG/3ML neb solution INHALE 3ML IN NEBULIZER BY INHALATION FOUR TIMES A DAY AS NEEDED FOR SHORTNESS OF BREATH       lidocaine-prilocaine (EMLA) 2.5-2.5 % external cream Apply topically as needed for moderate pain 30 g 1     mycophenolate, IDS 3865, (CELLCEPT) 250 MG capsule Take 2 tablets by mouth 2 times daily       ondansetron (ZOFRAN) 8 MG tablet Take 1 tablet (8 mg) by mouth every 8 hours as needed for nausea (vomiting) 30 tablet 2     predniSONE (DELTASONE) 10 MG tablet Take 40 mg by mouth daily       prochlorperazine (COMPAZINE) 10 MG  "tablet Take 1 tablet (10 mg) by mouth every 6 hours as needed for nausea or vomiting 30 tablet 2     tacrolimus (PROGRAF-GENERIC EQUIVALENT) 5 MG capsule Take 5 mg by mouth daily         No Known Allergies      EXAM:    Ht 1.778 m (5' 10\")   Wt 101.2 kg (223 lb)   BMI 32.00 kg/m   Video, no VS.     GENERAL:  Male, in no acute distress.  Alert and oriented x3. Well groomed.   HEENT:  Normocephalic, atraumatic. No conjunctival injection or eye swelling.   LUNGS:  Nonlabored breathing, no cough or audible wheezing, able to speak full sentences.  MSK: Full ROM UE.    SKIN: No rash on exposed skin.   NEURO: CN grossly intact, speech normal  PSYCH: Mentation appears normal, insight and judgement intact      Labs: PENDING    Imaging: n/a    Impression/Plan: Berny Estrada is a 74 year old male with cecal adenocarcinoma currently on 5-FU/leucovorin.    Cecal adenocarcinoma: Severe anemia led to the diagnosis.  5-FU/leucovorin C1 D1 on 3/6/2024 and continues to have some intermittent hematochezia.  He has completed 5 full cycles.  Now s/p 3 units PRBCs [facility], feeling better.  I attempted to access records, but unable to view anything.  With Dr. Blunt's departure next month, patient will be transferring all of [facility], but he was due for chemotherapy, which she will get later today, pending lab results.  Will be [facility] all of [facility] to the [facility] after this cycle.  Pending lab results, okay to proceed with 5-FU/leucovorin cycle 6 today.  -Plan to [facility] going forward    Kidney transplant: Currently on tacrolimus and following the transplant team closely.  Labs are pending.    Chart documentation with Dragon Voice recognition Software. Although reviewed after completion, some words and grammatical errors may remain.    25 minutes spent on the date of the encounter doing chart review, review of outside records, review of test results, interpretation of " tests, patient visit, documentation, and discussion with family     Brigid Thompson PA-C  Hematology/Oncology  [facility]                  Again, thank you for allowing me to participate in the care of your patient.        Sincerely,        Brigid Thompson PA-C

## 2024-07-15 NOTE — PROGRESS NOTES
Port needle left for access for treatment. Sterile technique performed and maintained. Patient tolerated procedure well. Tubes drawn in rainbow order. Tubes labeled and double signed. Transparent dressing placed with use of tegaderm.     Jennifer Bradford RN  [facility] Oncology/Infusion [place]

## 2024-07-15 NOTE — PROGRESS NOTES
Virtual Visit Details    Type of service:  Video Visit   Video Start Time: 8:42 AM  Video End Time:8:55 AM    Originating Location (pt. Location): Home    Distant Location (provider location):  On-site  Platform used for Video Visit: [facility]      Oncology/Hematology Visit Note    Jul 15, 2024    Reason for visit: Follow up cecal adenocarcinoma    Oncology HPI: Berny Estrada is a 74 year old male with medical history including right renal transplant (2014), hypertension, type 2 diabetes with proliferative diabetic retinopathy and long-term insulin use, CKD stage III, CAD, COPD, GERD, colon polyps (tubular adenoma), esophageal candidiasis, cholelithiasis, sleep apnea, hx of tobacco use (quit 50 years ago)- Presenting complaint of weight loss 10/23-11/23 10-15 lbs, then gain 10 over past few months and Significant ECOG decline over the 6 months,  using wheelchair and supplemental oxygen  -12/1/23- 12/9/23 hospitalized 2/2 anemia, hgb 7.9 w/melena x few months (was on iron)  - 12/6/23 CEA 11.69, 12/7/23 CEA 10  - 12/14/2023 EGD: Patchy white plaques in proximal esophagus and midesophagus suspicious for candidiasis, otherwise normal  -12/14/2023 [facility]:  - Fungating partially obstructing, partially circumferential (involving two thirds of the lumen circumference) large mass found in the cecum, including IC valve, with oozing              - Adenocarcinoma, moderately differentiated         - Loss of expression of MLH1 and PMS2; intact expression of MSH2 and MSH6   - MLH1 promoter methylation: POSITIVE   - BRAF V600 mutation positive  - 4 sessile polyps in ascending colon, 3-5 mm in size- - Sessile serrated adenoma with focal high-grade dysplasia and Separate minute fragment of adenocarcinoma  - 2 mm sessile polyp in transverse colon- Tubular adenoma; negative for high-grade dysplasia  - 2 sessile polyps in the ascending colon, 4-5 mm in size- Tubular adenoma;  negative for high-grade dysplasia  - 6 sessile polyps in the rectosigmoid colon, 3-8 mm in size- Sessile serrated adenoma with focal cytologic dysplasia. No evidence of high-grade dysplasia or invasive carcinoma  - Medium sized external hemorrhoids  12/21/23 CT CAP without contrast:  -Cardiomegaly, coronary artery stenting calcifications  - Dilated main pulmonary artery, 3.8 cm  - Calcified hilar lymphadenopathy  - RML and lingular bronchiectatic changes with associated fibroatelectasis, may be secondary to chronic infectious/inflammatory process, Ill-defined groundglass opacities predominantly affecting DERIC and lingula, Dependent atelectasis  - Small left-sided pleural effusion  -Atrophic thyroid with ill-defined subcentimeter nodularity  - Cholelithiasis  - Atrophic appearance of native kidneys, right lower quadrant renal transplant with mild hydronephrosis  - 1.6 cm hyperattenuating lesion of left kidney  - Ill-defined soft tissue thickening about cecum, likely corresponding to known cecal mass  - Associated thickening of peritoneal planes in the region  - Enlarged 2.1 x 1.7 cm mesenteric node adjacent to cecum  - Borderline enlarged 0.9 cm perirectal node  IMPRESSION:   1. Cecal mass with enlarged nodes of the right lower quadrant  including a 2.1 x 1.7 cm lymph node, which are suspicious for  metastatic disease. Indeterminate thickening and nodularity of the  peritoneal surfaces near the cecal mass could be due to fluid or  mesenteric spread of disease.  3. Indeterminate 1.6 cm lesion of the left native kidney. Consider  dedicated renal MRI with contrast for further characterization.  4. Right lower quadrant renal transplant with mild hydronephrosis.  5. Ill-defined groundglass opacities of the left upper lobe and  lingula suspicious for an infectious/inflammatory etiology. Additional  bronchiectatic and fibroatelectatic changes of the right middle lobe  and lingula suggesting a chronic inflammatory  process.  6. Small left-sided pleural effusion.  7. Dilated main pulmonary artery as can be seen with pulmonary  arterial hypertension.  8. Cholelithiasis without findings to suggest acute cholecystitis.     -12/21/23 CEA 31.3     Family history:  Brother- colorectal cancer, age 61, s/p surgery, receiving chemotherapy     Regarding kidney transplant  - right kidney transplant 2014  - on tacrolimus and prednisone 10 mg daily (off cellcept since 12/23)  - 12/23 Cr 1.6, GFR 45  Treatment:   3/6/2024 started  infusional 5FU and LV    [facility] with hemoglobin of 5.4, given transfusion and EGD negative for bleed, but unable to review records.  He was admitted again on 7/8/2024 with hemoglobin of 6.  Dr[facility] Blunt [facility] and spoke to his nephrologist and oncologist and it was determined that [facility] would [facility], but they would like 1 more infusion with [place] to ensure he continues his regimen.    Video visit today for follow-up and 5-FU/leucovorin.    Interval History: Berny was on video today and his wife was present, but not seen on video, I could still hear her talking and participating in the call.  [facility] and received 3 units of blood, he is feeling better.  He continues to have intermittent blood in the stool, but this is unchanged. He is ready to continue chemotherapy.  No other new concerns.    Review of Systems: See interval hx. Denies fevers, chills, new HA, , changes in vision, new cough, abdominal pain, N/V, diarrhea, changes in urination, bruising, rash.     PMHx and Social Hx reviewed per EPIC.      Medications:  Current Outpatient Medications   Medication Sig Dispense Refill    albuterol (PROAIR HFA/PROVENTIL HFA/VENTOLIN HFA) 108 (90 Base) MCG/ACT inhaler INHALE 2 PUFFS BY INHALATION EVERY 6 HOURS AS NEEDED FOR SHORTNESS OF BREATH, FOR SECRETIONS      apixaban ANTICOAGULANT (ELIQUIS) 5 MG tablet 10 mg      carvedilol (COREG)  "12.5 MG tablet Take 12.5 mg by mouth 2 times daily      insulin aspart U-10, diluted conc 10 units/mL, (NOVOLOG) 10 unit/ml injection Inject Subcutaneous 3 times daily (before meals) 10-16 units      loratadine (CLARITIN) 10 MG tablet 10 mg      Magnesium Oxide 420 MG TABS Take 1 tablet by mouth 2 times daily      OMEPRAZOLE PO Take 1 tablet by mouth daily      ondansetron (ZOFRAN) 8 MG tablet Take 1 tablet (8 mg) by mouth every 8 hours as needed for nausea (vomiting) 30 tablet 2    prochlorperazine (COMPAZINE) 10 MG tablet Take 1 tablet (10 mg) by mouth every 6 hours as needed for nausea or vomiting 30 tablet 2    sulfamethoxazole-trimethoprim (BACTRIM DS) 800-160 MG tablet Take 1 tablet by mouth three times a week      tamsulosin (FLOMAX) 0.4 MG capsule Take 0.4 mg by mouth daily      VITAMIN D, CHOLECALCIFEROL, PO Take 1 tablet by mouth once a week      amLODIPine (NORVASC) 10 MG tablet Take 10 mg by mouth daily (Patient not taking: Reported on 6/17/2024)      INSULIN GLARGINE SC Inject 34 Units Subcutaneous at bedtime 7/11 am      ipratropium - albuterol 0.5 mg/2.5 mg/3 mL (DUONEB) 0.5-2.5 (3) MG/3ML neb solution INHALE 3ML IN NEBULIZER BY INHALATION FOUR TIMES A DAY AS NEEDED FOR SHORTNESS OF BREATH      lidocaine-prilocaine (EMLA) 2.5-2.5 % external cream Apply topically as needed for moderate pain 30 g 1    mycophenolate, IDS 3865, (CELLCEPT) 250 MG capsule Take 2 tablets by mouth 2 times daily      ondansetron (ZOFRAN) 8 MG tablet Take 1 tablet (8 mg) by mouth every 8 hours as needed for nausea (vomiting) 30 tablet 2    predniSONE (DELTASONE) 10 MG tablet Take 40 mg by mouth daily      prochlorperazine (COMPAZINE) 10 MG tablet Take 1 tablet (10 mg) by mouth every 6 hours as needed for nausea or vomiting 30 tablet 2    tacrolimus (PROGRAF-GENERIC EQUIVALENT) 5 MG capsule Take 5 mg by mouth daily         No Known Allergies      EXAM:    Ht 1.778 m (5' 10\")   Wt 101.2 kg (223 lb)   BMI 32.00 kg/m   Video, no " VS.     GENERAL:  Male, in no acute distress.  Alert and oriented x3. Well groomed.   HEENT:  Normocephalic, atraumatic. No conjunctival injection or eye swelling.   LUNGS:  Nonlabored breathing, no cough or audible wheezing, able to speak full sentences.  MSK: Full ROM UE.    SKIN: No rash on exposed skin.   NEURO: CN grossly intact, speech normal  PSYCH: Mentation appears normal, insight and judgement intact      Labs: PENDING    Imaging: n/a    Impression/Plan: Berny Estrada is a 74 year old male with cecal adenocarcinoma currently on 5-FU/leucovorin.    Cecal adenocarcinoma: Severe anemia led to the diagnosis.  5-FU/leucovorin C1 D1 on 3/6/2024 and continues to have some intermittent hematochezia.  He has completed 5 full cycles.  Now s/p 3 units PRBCs [facility], feeling better.  I attempted to access records, but unable to view anything.  With Dr. Blunt's departure next month, patient will be transferring all of his onc[facility], but he was due for chemotherapy, which she will get later today, pending lab results.  Will be transferring all of his care to [facility] after this cycle.  Pending lab results, okay to proceed with 5-FU/leucovorin cycle 6 today.  -Plan to [facility] going forward    Kidney transplant: Currently on tacrolimus and following the transplant team closely.  Labs are pending.    Chart documentation with Dragon Voice recognition Software. Although reviewed after completion, some words and grammatical errors may remain.    25 minutes spent on the date of the encounter doing chart review, review of outside records, review of test results, interpretation of tests, patient visit, documentation, and discussion with family     Brigid Thompson PA-C  Hematology/Oncology  [facility]

## 2024-07-16 ENCOUNTER — INFUSION THERAPY VISIT (OUTPATIENT)
Dept: INFUSION THERAPY | Facility: CLINIC | Age: 74
End: 2024-07-16
Attending: INTERNAL MEDICINE
Payer: MEDICARE

## 2024-07-16 ENCOUNTER — PATIENT OUTREACH (OUTPATIENT)
Dept: CARE COORDINATION | Facility: CLINIC | Age: 74
End: 2024-07-16
Payer: MEDICARE

## 2024-07-16 VITALS
RESPIRATION RATE: 16 BRPM | OXYGEN SATURATION: 98 % | SYSTOLIC BLOOD PRESSURE: 149 MMHG | HEART RATE: 79 BPM | DIASTOLIC BLOOD PRESSURE: 67 MMHG | TEMPERATURE: 98.3 F

## 2024-07-16 DIAGNOSIS — C18.0 MALIGNANT NEOPLASM OF CECUM (H): Primary | ICD-10-CM

## 2024-07-16 PROCEDURE — 99207 PR NO CHARGE LOS: CPT

## 2024-07-16 PROCEDURE — G0498 CHEMO EXTEND IV INFUS W/PUMP: HCPCS

## 2024-07-16 NOTE — PROGRESS NOTES
"Infusion Nursing Note:  Berny Estrada presents today for port reaccess and 5FU pump reconnect.    Patient seen by provider today: No   present during visit today: Not Applicable.    Note: Patient reports at 11:30 pm last evening his 5FU pump was beeping occlusion. The patient and his wife were unable to correct the problem at home and called [facility]. An RN was sent to his house and was unable to flush the port. The patient reports the chemo pump was disconnected and he was instructed to come in today for possible TPA and to reconnect his 5FU pump.     Patient arrived to infusion with port accessed but not connected to anything. Writer removed dressing and noticed port needle only slightly into the patients port. Most of the needle was exposed.     Upon further discussion patient reports his 6 yr old grandson was on his lap and climbing on his chest. Patient educated to not have children or pets climb on him while port is accessed and chemo pump is infusing.     Port removed and was reaccessed without difficulty. Good, brisk blood return was noted.     Pharmacy manager updated and 5FU pump was remade accounting for amount of drug the patient received yesterday prior to interruption of medication.    Layla GROSS with [facility] updated regarding scheduling as the disconnect will likely need to occur overnight. Layla reached out to their manager and stated we are to instruct the patient to turn off the pump when the infusion is complete and they will send an RN out at 0800 on Thursday morning to disconnect the patient.     Intravenous Access:  Implanted Port.    Prior to discharge: Port is secured in place with tegaderm and flushed with 10cc NS with positive blood return noted.    Continuous home infusion CADD pump connected.    All connectors secured in place and clamps taped open.    Pump started, \"running\" noted on display (CADD): YES.   Capillary element taped to pt's skin per protocol.  Pump Connection " double checked with Glenys MORSE RN.  Patient instructed to call our clinic or [facility] with any questions or concerns at home.  Patient verbalized understanding.    Patient set up for pump disconnect at home with [facility] on 0800 (infusion will be complete at 3 am but [facility] will not be able to send an RN out to disconnect until 0800.)         Discharge Plan:   AVS to patient via MYCHART. .  Patient discharged in stable condition accompanied by: wife.  Departure Mode: Wheelchair.      Jennifer Bradford RN

## 2024-07-16 NOTE — PROGRESS NOTES
Social Work - Distress Screen Intervention  [facility]    Identified Concern and Score from Distress Screenin. How concerned are you about your ability to eat? (!) 10     2. How concerned are you about unintended weight loss or your current weight? 5     3. How concerned are you about feeling depressed or very sad?  1     4. How concerned are you about feeling anxious or very scared?  2     5. Do you struggle with the loss of meaning and ignacia in your life?  Not at all     6. How concerned are you about work and home life issues that may be affected by your cancer?  (!) 10     7. How concerned are you about knowing what resources are available to help you?  (!) 10     8. Do you currently have what you would describe as Hoahaoism or spiritual struggles? Not at all     9. If you want to be contacted by one of our professionals, I can send a message to them right now.  Oncology Social Worker; Oncology Dietitian; Oncology Psychologist       Date of Distress Screen: 7/15/24  Data: At time of last visit, patient scored positive on distress screening.  outreached to patient today to follow up on elevated distress and introduce psychosocial services and support.  Intervention/Education provided:  contacted patient by phone to discuss distress screening results.     Wife answered phone and detailed that Berny could not speak at time of call, but would outreach to this clinician when available.  offered onc social work contact information.     Per chart review, [facility]     Follow-up Required:  to remain available for support and await return call from patient    Ela Bartlett, TALITA, LICSW, OSW-C  Clinical - Adult Oncology  Phone: [phone]  She/Her/Hers  [facility]: Fauzia MIRANDA  8am-4:30pm  [facility]: TRESSA Pa F 8am-4:30pm   Support Groups at [facility]: Social Work  Services for Cancer Patients ([email or web address])

## 2024-07-17 ENCOUNTER — PATIENT OUTREACH (OUTPATIENT)
Dept: ONCOLOGY | Facility: HOSPITAL | Age: 74
End: 2024-07-17
Payer: MEDICARE

## 2024-07-17 NOTE — PROGRESS NOTES
Phoned pt per request: Pt declined a therapy appt at this time.     Oncology Distress Screening      Row Name 07/15/24 0800       How concerned do you feel regarding the following common issues people with cancer face. Please answer with a number from 0-10 where 0=not concerned and 10=very concerned. PT response of >=8  will result in outreach from Support Team.   1. How concerned are you about your ability to eat? 10 Abnormal        2. How concerned are you about unintended weight loss or your current weight? 5       3. How concerned are you about feeling depressed or very sad? 1       4. How concerned are you about feeling anxious or very scared? 2       5. Do you struggle with the loss of meaning and ignacia in your life? Not at all       6. How concerned are you about work and home life issues that may be affected by your cancer? 10 Abnormal        7. How concerned are you about knowing what resources are available to help you? 10 Abnormal        8. Do you currently have what you would describe as Restorationist or spiritual struggles? Not at all       You can also ask to be contacted by one of our Oncology Supportive Care professionals.   9. If you want to be contacted by one of our professionals, I can send a message to them right now. Oncology Social Worker;Oncology Dietitian;Oncology Psychologist

## 2024-07-18 ENCOUNTER — PATIENT OUTREACH (OUTPATIENT)
Dept: SURGERY | Facility: CLINIC | Age: 74
End: 2024-07-18
Payer: MEDICARE

## 2024-07-18 NOTE — PROGRESS NOTES
[facility]        Cecal adenocarcinoma: Severe anemia led to the diagnosis.  5-FU/leucovorin C1 D1 on 3/6/2024 and continues to have some intermittent hematochezia.  He has completed 5 full cycles.    Referral to reconsider surgical resection of tumor. Per review with Dr. Reid    He is not a surgical candidate. His disease is progressing on chemotherapy and he shoud continue treatment.

## 2024-07-22 ENCOUNTER — TELEPHONE (OUTPATIENT)
Dept: SURGERY | Facility: CLINIC | Age: 74
End: 2024-07-22
Payer: MEDICARE

## 2024-07-22 DIAGNOSIS — C18.0 MALIGNANT NEOPLASM OF CECUM (H): ICD-10-CM

## 2024-07-22 DIAGNOSIS — C18.9 MALIGNANT NEOPLASM OF COLON (H): Primary | ICD-10-CM

## 2024-07-22 DIAGNOSIS — R97.8 OTHER ABNORMAL TUMOR MARKERS: ICD-10-CM

## 2024-07-22 NOTE — TELEPHONE ENCOUNTER
[facility] Call Center    Phone Message    May a detailed message be left on voicemail: yes     Reason for Call: Appointment Intake    Referring Provider Name: Sandra Hook MD  Diagnosis and/or Symptoms: Malignant neoplasm of colon, unspecified (H) [C18.9]    Patient is being referred for colon cancer. Please review and follow up with patient per guidelines. Thanks!     Action Taken: Message routed to:  [facility] ([facility] CR    Travel Screening: Not Applicable     Date of Service:

## 2024-07-22 NOTE — TELEPHONE ENCOUNTER
Discussed that I spoke with Dr. Reid and his cancer is growing while he is on chemotherapy. He is not a candidate for surgery at this time and he should continue with chemotherapy.

## 2024-07-23 ENCOUNTER — TELEPHONE (OUTPATIENT)
Dept: ONCOLOGY | Facility: CLINIC | Age: 74
End: 2024-07-23
Payer: MEDICARE

## 2024-07-23 NOTE — PROGRESS NOTES
CLINICAL NUTRITION SERVICES- ONCOLOGY DISTRESS SCREENING     Identified Concern and Score From Distress Screenin. How concerned are you about your ability to eat? :  10  2. How concerned are you about unintended weight loss or your current weight? : 5  9. If you want to be contacted by one of our professionals, I can send a message to them right now:   Oncology Social Worker;Oncology Dietitian;Oncology Psychologist      Date of Distress Screenin/15     Findings: Phone call with Berny's spouse. Declined nutrition visit at this time.      Follow-up Required: As needed.     Tami Le RD, [facility] [phone]

## 2024-08-21 PROBLEM — D50.0 IRON DEFICIENCY ANEMIA DUE TO CHRONIC BLOOD LOSS: Status: ACTIVE | Noted: 2024-08-21

## 2024-08-21 RX ORDER — DIPHENHYDRAMINE HYDROCHLORIDE 50 MG/ML
50 INJECTION INTRAMUSCULAR; INTRAVENOUS
Start: 2024-08-28

## 2024-08-21 RX ORDER — EPINEPHRINE 1 MG/ML
0.3 INJECTION, SOLUTION INTRAMUSCULAR; SUBCUTANEOUS EVERY 5 MIN PRN
OUTPATIENT
Start: 2024-08-28

## 2024-08-21 RX ORDER — HEPARIN SODIUM,PORCINE 10 UNIT/ML
5-20 VIAL (ML) INTRAVENOUS DAILY PRN
OUTPATIENT
Start: 2024-08-28

## 2024-08-21 RX ORDER — MEPERIDINE HYDROCHLORIDE 25 MG/ML
25 INJECTION INTRAMUSCULAR; INTRAVENOUS; SUBCUTANEOUS EVERY 30 MIN PRN
OUTPATIENT
Start: 2024-08-28

## 2024-08-21 RX ORDER — ALBUTEROL SULFATE 0.83 MG/ML
2.5 SOLUTION RESPIRATORY (INHALATION)
OUTPATIENT
Start: 2024-08-28

## 2024-08-21 RX ORDER — ALBUTEROL SULFATE 90 UG/1
1-2 AEROSOL, METERED RESPIRATORY (INHALATION)
Start: 2024-08-28

## 2024-08-21 RX ORDER — HEPARIN SODIUM (PORCINE) LOCK FLUSH IV SOLN 100 UNIT/ML 100 UNIT/ML
5 SOLUTION INTRAVENOUS
OUTPATIENT
Start: 2024-08-28

## 2024-08-21 RX ORDER — METHYLPREDNISOLONE SODIUM SUCCINATE 125 MG/2ML
125 INJECTION, POWDER, LYOPHILIZED, FOR SOLUTION INTRAMUSCULAR; INTRAVENOUS
Start: 2024-08-28

## 2024-08-28 ENCOUNTER — LAB REQUISITION (OUTPATIENT)
Dept: LAB | Facility: CLINIC | Age: 74
End: 2024-08-28

## 2024-08-28 LAB
TACROLIMUS BLD-MCNC: <1 UG/L (ref 5–15)
TME LAST DOSE: ABNORMAL H
TME LAST DOSE: ABNORMAL H

## 2024-08-28 PROCEDURE — 80197 ASSAY OF TACROLIMUS: CPT

## 2024-09-05 ENCOUNTER — ENROLLMENT (OUTPATIENT)
Dept: HOME HEALTH SERVICES | Facility: HOME HEALTH | Age: 74
End: 2024-09-05
Payer: COMMERCIAL

## 2024-09-05 ENCOUNTER — ENROLLMENT (OUTPATIENT)
Dept: HOME HEALTH SERVICES | Facility: HOME HEALTH | Age: 74
End: 2024-09-05
Payer: MEDICARE

## 2024-09-27 NOTE — PROGRESS NOTES
2024: 5FU MUST BE ON CADD FOR INSURANCE COVERAGE. IF NOT, RETURN TO INTAKE FOR SELF PAY QUOTE AND ABN. 1 MONTH PUMP LETTER MUST BE ATTACHED. OTHER THERAPY: IF CADD DISPENSED, RETUN TO INTAKE    NO COVERAGE FOR TPA, LINE CARE, HYDRATION, OR ONPRO    PT HAS COVERAGE FOR HOME DISCONNECTS [facility]

## 2024-10-08 ENCOUNTER — HOME INFUSION (OUTPATIENT)
Dept: HOME HEALTH SERVICES | Facility: HOME HEALTH | Age: 74
End: 2024-10-08
Payer: MEDICARE

## 2024-10-08 DIAGNOSIS — C18.0 MALIGNANT NEOPLASM OF CECUM (H): Primary | ICD-10-CM

## 2024-10-22 RX ORDER — WATER 10 ML/10ML
2.2 INJECTION INTRAMUSCULAR; INTRAVENOUS; SUBCUTANEOUS PRN
Qty: 999999 ML | Refills: 0 | Status: ACTIVE | OUTPATIENT
Start: 2024-10-23 | End: 2025-03-06

## 2024-11-17 ENCOUNTER — HEALTH MAINTENANCE LETTER (OUTPATIENT)
Age: 74
End: 2024-11-17

## 2025-03-08 ENCOUNTER — HEALTH MAINTENANCE LETTER (OUTPATIENT)
Age: 75
End: 2025-03-08

## 2025-06-01 ENCOUNTER — HEALTH MAINTENANCE LETTER (OUTPATIENT)
Age: 75
End: 2025-06-01

## (undated) DEVICE — SU VICRYL 3-0 SH 27" UND J416H

## (undated) DEVICE — DECANTER BAG 2002S

## (undated) DEVICE — SU DERMABOND ADVANCED .7ML DNX12

## (undated) DEVICE — GOWN XLG DISP 9545

## (undated) DEVICE — DRSG TEGADERM 4X4 3/4" 1626W

## (undated) DEVICE — SU MONOCRYL 4-0 P-3 18" UND Y494G

## (undated) DEVICE — LINEN TOWEL PACK X5 5464

## (undated) DEVICE — LINEN GOWN XLG 5407

## (undated) DEVICE — PROBE COVER INTRAOPERATIVE 5"X96" PC1308

## (undated) DEVICE — GLOVE BIOGEL PI ULTRATOUCH G SZ 7.5 42175

## (undated) DEVICE — KIT INTRODUCER FLUENT MICRO 5FRX10CM ECHO TIP KIT-038-04

## (undated) DEVICE — Device

## (undated) DEVICE — CONNECTOR MALE TO MALE LL

## (undated) DEVICE — PACK CENTRAL LINE INSERTION SAN32CLFCG

## (undated) DEVICE — KNIFE HANDLE W/15 BLADE 371615

## (undated) RX ORDER — CEFAZOLIN SODIUM 2 G/50ML
SOLUTION INTRAVENOUS
Status: DISPENSED
Start: 2024-02-28

## (undated) RX ORDER — ACETAMINOPHEN 325 MG/1
TABLET ORAL
Status: DISPENSED
Start: 2024-02-28

## (undated) RX ORDER — LABETALOL HYDROCHLORIDE 5 MG/ML
INJECTION, SOLUTION INTRAVENOUS
Status: DISPENSED
Start: 2024-02-28